# Patient Record
Sex: FEMALE | Race: WHITE | Employment: OTHER | URBAN - METROPOLITAN AREA
[De-identification: names, ages, dates, MRNs, and addresses within clinical notes are randomized per-mention and may not be internally consistent; named-entity substitution may affect disease eponyms.]

---

## 2017-08-23 LAB — PAP SMEAR, EXTERNAL: NEGATIVE

## 2018-09-27 ENCOUNTER — OFFICE VISIT (OUTPATIENT)
Dept: INTERNAL MEDICINE CLINIC | Age: 38
End: 2018-09-27

## 2018-09-27 VITALS
RESPIRATION RATE: 16 BRPM | HEART RATE: 69 BPM | HEIGHT: 67 IN | TEMPERATURE: 98.2 F | SYSTOLIC BLOOD PRESSURE: 129 MMHG | BODY MASS INDEX: 34.37 KG/M2 | DIASTOLIC BLOOD PRESSURE: 90 MMHG | OXYGEN SATURATION: 96 % | WEIGHT: 219 LBS

## 2018-09-27 DIAGNOSIS — J98.8 RESPIRATORY TRACT INFECTION: ICD-10-CM

## 2018-09-27 DIAGNOSIS — G43.809 OTHER MIGRAINE WITHOUT STATUS MIGRAINOSUS, NOT INTRACTABLE: ICD-10-CM

## 2018-09-27 DIAGNOSIS — M54.5 LOW BACK PAIN, UNSPECIFIED BACK PAIN LATERALITY, UNSPECIFIED CHRONICITY, WITH SCIATICA PRESENCE UNSPECIFIED: ICD-10-CM

## 2018-09-27 DIAGNOSIS — M25.552 LEFT HIP PAIN: ICD-10-CM

## 2018-09-27 DIAGNOSIS — E03.9 ACQUIRED HYPOTHYROIDISM: Primary | ICD-10-CM

## 2018-09-27 RX ORDER — PENICILLIN V POTASSIUM 500 MG/1
TABLET, FILM COATED ORAL
Refills: 0 | COMMUNITY
Start: 2018-07-28 | End: 2018-09-27 | Stop reason: ALTCHOICE

## 2018-09-27 RX ORDER — BUTALBITAL, ACETAMINOPHEN AND CAFFEINE 50; 325; 40 MG/1; MG/1; MG/1
TABLET ORAL
Refills: 1 | COMMUNITY
Start: 2018-08-10 | End: 2020-03-12

## 2018-09-27 RX ORDER — SUMATRIPTAN 100 MG/1
100 TABLET, FILM COATED ORAL
Refills: 3 | COMMUNITY
Start: 2018-08-09 | End: 2020-08-03

## 2018-09-27 RX ORDER — LEVOTHYROXINE AND LIOTHYRONINE 38; 9 UG/1; UG/1
60 TABLET ORAL DAILY
Qty: 90 TAB | Refills: 3 | Status: SHIPPED | OUTPATIENT
Start: 2018-09-27 | End: 2020-03-12

## 2018-09-27 RX ORDER — IBUPROFEN 600 MG/1
TABLET ORAL
Refills: 0 | COMMUNITY
Start: 2018-07-18 | End: 2020-08-03

## 2018-09-27 NOTE — PROGRESS NOTES
Chief Complaint Patient presents with [facility] Establish Care  Back Pain  
  upper back pain for 4 days due to having a Cold for the past week  Cold Symptoms  
  x 1 week Patient was given a copy of the Advanced Medical Directive form and understands to bring it in once completed. 1. Have you been to the ER, urgent care clinic since your last visit? Hospitalized since your last visit? Yes When: 7-17-18 Where: [facility] ER facility in [place] Reason: Right Hand pain 2. Have you seen or consulted any other health care providers outside of the [address] [place] [address] since your last visit? Include any pap smears or colon screening.  No

## 2018-09-27 NOTE — PROGRESS NOTES
INTERNISTS OF [facility]: 
10/7/2018, MRN: [id number] Osmani Cruz is a 43 y.o. female and presents to clinic to [facility]; Back Pain (upper back pain for 4 days due to having a Cold for the past week); and Cold Symptoms (x 1 week) Subjective: The pt is a 41yo female with h/o hypothyroidism, migraine HAs, thoracic outlet syndrome (left) s/p surgery 12/17, and kidney stones. 1. Health Maintenance: 
- Last PAP: +H/o bicornuate uterus. +H/o endometriosis. +H/o hysterectomy but she still has a cervix. Her last PAP was a year ago. +Followed by GYN team.  
- Diet: She is trying to diet. - Exercise: She is exercising regularly but hasn't since she had a respiratory tract infection. 
- Tobacco use: None - Drug use: None 
- ETOH use: Socially/rarely - Energy drink consumption: None 2. Hypothyroidism: Diagnosed 4 yrs ago. She tried \"other ones\" [thyroid rx] and found that [place] thyroid rx worked best for her. She's been out of her rx x 2 months. No h/o thyroid nodules. 3. Respiratory Tract Infection: Present x 1 wk. No sore throat. Cough sx resolved. Sx are improving. No SOB. +Nasal congestion. She declines getting the flu vaccine every year. 4. Migraine HAs: Her Imitrex dose was increased to 100mg 6 months ago. She tries to take motrin first if she has HAs. If pain continues, she will take fioricet. If sx persist, she will take Imitrex. She has had botox injections in the past which eventually stopped working. Her previous Neurologist worked at the [employer] in [place]. She's had \"tons of MRIs. \" She gets on avg one HA a wk. Pain is retro-orbital. +Photosensitivty and phonosensitivity. She was unable to get relief with midrine and other triptan abortive rxs in the past. +Auras (smell or vision disturbances). Patient Active Problem List  
 Diagnosis Date Noted  BMI 34.0-34.9,adult 10/07/2018  Acquired hypothyroidism 10/07/2018  Migraine 10/07/2018 Current Outpatient Prescriptions Medication Sig Dispense Refill  butalbital-acetaminophen-caffeine (FIORICET, ESGIC) -40 mg per tablet TAKE 1 TABLET BY MOUTH EVERY 6 HOURS AS NEEDED FOR MIGRAINE  1  
 SUMAtriptan (IMITREX) 100 mg tablet   3  ibuprofen (MOTRIN) 600 mg tablet TAKE ONE TABLET BY MOUTH EVERY 6 HOURS AS NEEDED  0  
 thyroid, Pork, (ARMOUR THYROID) 60 mg tablet Take 1 Tab by mouth daily. 90 Tab 3 Allergies Allergen Reactions  Codeine Hives, Itching and Swelling Past Medical History:  
Diagnosis Date  Benign tumor of cervix  Calculus of kidney  Elevated BP without diagnosis of hypertension  Fracture of finger of right hand 2018  
 5th digit  H/O thoracic outlet syndrome  Headache  Migraine headache  Thyroid disease   
 hypothyroidism  Trauma Past Surgical History:  
Procedure Laterality Date  HX BREAST REDUCTION    
 bilateral  
 HX  SECTION    
 x2  
 HX CHOLECYSTECTOMY  HX DILATION AND CURETTAGE    
 x3  
 HX PARTIAL HYSTERECTOMY Family History Problem Relation Age of Onset  Migraines Mother  Hypertension Mother  Cancer Father  Heart Disease Father  Heart Attack Father  No Known Problems Sister  Diabetes Maternal Grandmother  Dementia Maternal Grandmother  No Known Problems Maternal Grandfather  No Known Problems Paternal Grandmother  No Known Problems Paternal Grandfather  Migraines Son  Migraines Daughter Social History Substance Use Topics  Smoking status: Never Smoker  Smokeless tobacco: Never Used  Alcohol use Yes Comment: rare ROS Review of Systems Constitutional: Negative for chills and fever. HENT: Positive for congestion. Negative for ear pain and sore throat. Eyes: Negative for blurred vision and pain. Respiratory: Negative for cough and shortness of breath. Cardiovascular: Negative for chest pain. Gastrointestinal: Negative for abdominal pain, blood in stool and melena. Genitourinary: Negative for dysuria and hematuria. Musculoskeletal: Positive for back pain and joint pain (left hip pain off/on). Negative for myalgias. Skin: Negative for rash. Neurological: Positive for headaches. Negative for tingling and focal weakness. Endo/Heme/Allergies: Does not bruise/bleed easily. Psychiatric/Behavioral: Negative for substance abuse. Objective Vitals:  
 09/27/18 4797 BP: 129/90 Pulse: 69 Resp: 16 Temp: 98.2 °F (36.8 °C) TempSrc: Oral  
SpO2: 96% Weight: 219 lb (99.3 kg) Height: 5' 6.93\" (1.7 m) PainSc:   5 PainLoc: Back Physical Exam  
Constitutional: She is oriented to person, place, and time and well-developed, well-nourished, and in no distress. HENT:  
Head: Normocephalic and atraumatic. Right Ear: External ear normal.  
Left Ear: External ear normal.  
Nose: Nose normal.  
Mouth/Throat: Oropharynx is clear and moist. No oropharyngeal exudate. Clear TMs Eyes: Conjunctivae and EOM are normal. Pupils are equal, round, and reactive to light. Right eye exhibits no discharge. Left eye exhibits no discharge. No scleral icterus. Neck: Neck supple. No thyromegaly present. Cardiovascular: Normal rate, regular rhythm, normal heart sounds and intact distal pulses. Exam reveals no gallop and no friction rub. No murmur heard. Pulmonary/Chest: Effort normal and breath sounds normal. No respiratory distress. She has no wheezes. She has no rales. Abdominal: Soft. Bowel sounds are normal. She exhibits no distension. There is no tenderness. There is no rebound and no guarding. Musculoskeletal: She exhibits no edema or tenderness (BUE). Good ROM along b/l hip jts. No effusions. Spinous processes are NTTP Lymphadenopathy:  
  She has no cervical adenopathy. Neurological: She is alert and oriented to person, place, and time. She exhibits normal muscle tone. Gait normal.  
Negative straight leg raise test b/l  
Skin: Skin is warm and dry. No erythema. Psychiatric: Affect normal.  
Nursing note and vitals reviewed. Assessment/Plan: 1. Acquired hypothyroidism: She's been out of her thyroid rx x 2 months. Checking a CBC, BMP, and TFTs.  Refilling her [place] Thyroid pork Rx. ORDERS: 
- CBC WITH AUTOMATED DIFF; Future - METABOLIC PANEL, BASIC; Future 
- TSH AND FREE T4; Future - thyroid, Pork, (ARMOUR THYROID) 60 mg tablet; Take 1 Tab by mouth daily. Dispense: 90 Tab; Refill: 3 2. BMI 34.0-34.9,adult: Weight is 219lbs. I encouraged her to reduce her processed food intake and to get regular aerobic exercise. I will recheck her weight at her follow-up appointment. I will screen her for diabetes with an A1c. ORDERS: 
- HEMOGLOBIN A1C W/O EAG; Future 3. Respiratory tract infection: Resolving per hx. PE findings are reassuring. Observation. 4. Other migraine : Stable. Botox injections and midrin per pt hx did not improve her sx Placing a referral to neurology for long-term management. Continue with medications as previously prescribed ORDERS: 
[facility] Neuro ref [facility] 5. Health Maintenance: Requesting her previous PCP records, last Pap smear, and vaccine records.  Activity as tolerated. If her left hip and lower back pain worsen, I may refer her to PT/Orthopedics at her follow-up appointment. Health Maintenance Due Topic Date Due  
 DTaP/Tdap/Td series (1 - Tdap) 05/06/2000  PAP AKA CERVICAL CYTOLOGY  05/06/2000  Influenza Age 5 to Adult  08/01/2018 Lab review: labs ordered as mentioned above I have discussed the diagnosis with the patient and the intended plan as seen in the above orders. The patient has received an after-visit summary and questions were answered concerning future plans.   I have discussed medication side effects and warnings with the patient as well. I have reviewed the plan of care with the patient, accepted their input and they are in agreement with the treatment goals. All questions were answered. The patient understands the plan of care. Handouts provided today with above information. Pt instructed if symptoms worsen to call the office or report to the ED for continued care. Greater than 50% of the visit time was spent in counseling and/or coordination of care. Voice recognition was used to generate this report, which may have resulted in some phonetic based errors in grammar and contents. Even though attempts were made to correct all the mistakes, some may have been missed, and remained in the body of the document. Follow-up Disposition: 
Return in about 8 weeks (around 11/22/2018), or if symptoms worsen or fail to improve, for hypothyroidism, back pain, left hip pain.  
 
Isabel Sanches MD

## 2018-09-27 NOTE — PATIENT INSTRUCTIONS
Patient was given a copy of the Advanced Medical Directive form and understands to bring it in once completed. Health Maintenance Due Topic Date Due  
 DTaP/Tdap/Td series (1 - Tdap) 05/06/2000  PAP AKA CERVICAL CYTOLOGY  05/06/2000  Influenza Age 5 to Adult  08/01/2018 Body Mass Index: Care Instructions Your Care Instructions Body mass index (BMI) can help you see if your weight is raising your risk for health problems. It uses a formula to compare how much you weigh with how tall you are. · A BMI lower than 18.5 is considered underweight. · A BMI between 18.5 and 24.9 is considered healthy. · A BMI between 25 and 29.9 is considered overweight. A BMI of 30 or higher is considered obese. If your BMI is in the normal range, it means that you have a lower risk for weight-related health problems. If your BMI is in the overweight or obese range, you may be at increased risk for weight-related health problems, such as high blood pressure, heart disease, stroke, arthritis or joint pain, and diabetes. If your BMI is in the underweight range, you may be at increased risk for health problems such as fatigue, lower protection (immunity) against illness, muscle loss, bone loss, hair loss, and hormone problems. BMI is just one measure of your risk for weight-related health problems. You may be at higher risk for health problems if you are not active, you eat an unhealthy diet, or you drink too much alcohol or use tobacco products. Follow-up care is a key part of your treatment and safety. Be sure to make and go to all appointments, and call your doctor if you are having problems. It's also a good idea to know your test results and keep a list of the medicines you take. How can you care for yourself at home? · Practice healthy eating habits. This includes eating plenty of fruits, vegetables, whole grains, lean protein, and low-fat dairy. · If your doctor recommends it, get more exercise. Walking is a good choice. Bit by bit, increase the amount you walk every day. Try for at least 30 minutes on most days of the week. · Do not smoke. Smoking can increase your risk for health problems. If you need help quitting, talk to your doctor about stop-smoking programs and medicines. These can increase your chances of quitting for good. · Limit alcohol to 2 drinks a day for men and 1 drink a day for women. Too much alcohol can cause health problems. If you have a BMI higher than 25 · Your doctor may do other tests to check your risk for weight-related health problems. This may include measuring the distance around your waist. A waist measurement of more than 40 inches in men or 35 inches in women can increase the risk of weight-related health problems. · Talk with your doctor about steps you can take to stay healthy or improve your health. You may need to make lifestyle changes to lose weight and stay healthy, such as changing your diet and getting regular exercise. If you have a BMI lower than 18.5 · Your doctor may do other tests to check your risk for health problems. · Talk with your doctor about steps you can take to stay healthy or improve your health. You may need to make lifestyle changes to gain or maintain weight and stay healthy, such as getting more healthy foods in your diet and doing exercises to build muscle. Where can you learn more? Go to http://julio-kristi.info/. Enter S176 in the search box to learn more about \"Body Mass Index: Care Instructions. \" Current as of: October 9, 2017 Content Version: 11.7 © 1712-5304 Healthwise, Incorporated. Care instructions adapted under license by [employer] (which disclaims liability or warranty for this information).  If you have questions about a medical condition or this instruction, always ask your healthcare professional. Laurel Crawford, Inc[employer] disclaims any warranty or liability for your use of this information.

## 2018-09-27 NOTE — MR AVS SNAPSHOT
[facility] 
 
 
 [address], [address] [facility] 
[phone] Patient: Concepción Veras MRN: [id number] :1980 Visit Information Date & Time Provider Department Dept. Phone Encounter #  
 2018  9:30 AM Isabel Sanches MD [facility] of [address] [place] [address] [phone] Follow-up Instructions Return in about 8 weeks (around 2018), or if symptoms worsen or fail to improve, for hypothyroidism, back pain, left hip pain. Your Appointments 2018 10:35 AM  
LAB with [facility] NURSE VISIT [facility] of [address] [place] [address] [facility] Appt Note: labs per [address], [address] Replaced by [facility] [address]  
  
   
 [address], [address]  
  
    
 2018 11:30 AM  
Office Visit with Isabel Sanches MD  
Internists of [address] [place] [address]) Appt Note: ov per [facility], [address] [facility] [address]  
  
   
 [address], [address] Upcoming Health Maintenance Date Due DTaP/Tdap/Td series (1 - Tdap) 2000 PAP AKA CERVICAL CYTOLOGY 2000 Influenza Age 5 to Adult 2018 Allergies as of 2018  Review Complete On: 2018 By: Isabel Sanches MD  
  
 Severity Noted Reaction Type Reactions Codeine High 2018    Hives, Itching, Swelling Current Immunizations  Never Reviewed No immunizations on file. Not reviewed this visit You Were Diagnosed With   
  
 Codes Comments Acquired hypothyroidism    -  Primary ICD-10-CM: E03.9 ICD-9-CM: 244.9 BMI 34.0-34.9,adult     ICD-10-CM: K31.30 
ICD-9-CM: V85.34 Respiratory tract infection     ICD-10-CM: J98.8 ICD-9-CM: 519.8 Other migraine without status migrainosus, not intractable     ICD-10-CM: C67.325 ICD-9-CM: 346.80 Vitals BP Pulse Temp Resp Height(growth percentile) Weight(growth percentile) 129/90 (BP 1 Location: Left arm, BP Patient Position: Sitting) 69 98.2 °F (36.8 °C) (Oral) 16 5' 6.93\" (1.7 m) 219 lb (99.3 kg) SpO2 BMI OB Status Smoking Status 96% 34.37 kg/m2 Hysterectomy Never Smoker BMI and BSA Data Body Mass Index Body Surface Area  
 34.37 kg/m 2 2.17 m 2 Preferred Pharmacy Pharmacy Name Phone [facility] [address] [facility], [address] [place] [address] [address] Your Updated Medication List  
  
   
This list is accurate as of 9/27/18 10:34 AM.  Always use your most recent med list.  
  
  
  
  
 butalbital-acetaminophen-caffeine -40 mg per tablet Commonly known as:  FIORICET, ESGIC  
TAKE 1 TABLET BY MOUTH EVERY 6 HOURS AS NEEDED FOR MIGRAINE  
  
 ibuprofen 600 mg tablet Commonly known as:  MOTRIN  
TAKE ONE TABLET BY MOUTH EVERY 6 HOURS AS NEEDED  
  
 SUMAtriptan 100 mg tablet Commonly known as:  IMITREX  
  
 thyroid (Pork) 60 mg tablet Commonly known as:  ARMOUR THYROID Take 1 Tab by mouth daily. Prescriptions Sent to Pharmacy Refills  
 thyroid, Pork, (ARMOUR THYROID) 60 mg tablet 3 Sig: Take 1 Tab by mouth daily. Class: Normal  
 Pharmacy: [address] [place] [address], [address] [place] [address] #: [phone] Route: Oral  
  
We Performed the Following REFERRAL TO NEUROLOGY [UGR29 Custom] Follow-up Instructions Return in about 8 weeks (around 11/22/2018), or if symptoms worsen or fail to improve, for hypothyroidism, back pain, left hip pain. To-Do List   
 09/27/2018 Lab:  CBC WITH AUTOMATED DIFF Around 09/27/2018 Lab:  HEMOGLOBIN A1C W/O EAG Around 09/27/2018 Lab:  METABOLIC PANEL, BASIC Around 09/27/2018 Lab:  TSH AND FREE T4 Referral Information Referral ID Referred By Referred To  
  
 [id number] Jeannie White MD   
   [id number]   
   [address] [address] Phone: [phone] Fax: [phone] Visits Status Start Date End Date 1 New Request 9/27/18 9/27/19 If your referral has a status of pending review or denied, additional information will be sent to support the outcome of this decision. Patient Instructions Patient was given a copy of the Advanced Medical Directive form and understands to bring it in once completed. Health Maintenance Due Topic Date Due  
 DTaP/Tdap/Td series (1 - Tdap) 05/06/2000  PAP AKA CERVICAL CYTOLOGY  05/06/2000  Influenza Age 5 to Adult  08/01/2018 Body Mass Index: Care Instructions Your Care Instructions Body mass index (BMI) can help you see if your weight is raising your risk for health problems. It uses a formula to compare how much you weigh with how tall you are. · A BMI lower than 18.5 is considered underweight. · A BMI between 18.5 and 24.9 is considered healthy. · A BMI between 25 and 29.9 is considered overweight. A BMI of 30 or higher is considered obese. If your BMI is in the normal range, it means that you have a lower risk for weight-related health problems. If your BMI is in the overweight or obese range, you may be at increased risk for weight-related health problems, such as high blood pressure, heart disease, stroke, arthritis or joint pain, and diabetes. If your BMI is in the underweight range, you may be at increased risk for health problems such as fatigue, lower protection (immunity) against illness, muscle loss, bone loss, hair loss, and hormone problems. BMI is just one measure of your risk for weight-related health problems. You may be at higher risk for health problems if you are not active, you eat an unhealthy diet, or you drink too much alcohol or use tobacco products. Follow-up care is a key part of your treatment and safety. Be sure to make and go to all appointments, and call your doctor if you are having problems. It's also a good idea to know your test results and keep a list of the medicines you take. How can you care for yourself at home? · Practice healthy eating habits. This includes eating plenty of fruits, vegetables, whole grains, lean protein, and low-fat dairy. · If your doctor recommends it, get more exercise. Walking is a good choice. Bit by bit, increase the amount you walk every day. Try for at least 30 minutes on most days of the week. · Do not smoke. Smoking can increase your risk for health problems. If you need help quitting, talk to your doctor about stop-smoking programs and medicines. These can increase your chances of quitting for good. · Limit alcohol to 2 drinks a day for men and 1 drink a day for women. Too much alcohol can cause health problems. If you have a BMI higher than 25 · Your doctor may do other tests to check your risk for weight-related health problems. This may include measuring the distance around your waist. A waist measurement of more than 40 inches in men or 35 inches in women can increase the risk of weight-related health problems. · Talk with your doctor about steps you can take to stay healthy or improve your health. You may need to make lifestyle changes to lose weight and stay healthy, such as changing your diet and getting regular exercise. If you have a BMI lower than 18.5 · Your doctor may do other tests to check your risk for health problems. · Talk with your doctor about steps you can take to stay healthy or improve your health. You may need to make lifestyle changes to gain or maintain weight and stay healthy, such as getting more healthy foods in your diet and doing exercises to build muscle. Where can you learn more? Go to http://julio-kristi.info/. Enter S176 in the search box to learn more about \"Body Mass Index: Care Instructions. \" Current as of: October 9, 2017 Content Version: 11.7 © 3083-3798 [employer], [employer]. Care instructions adapted under license by [employer] (which disclaims liability or warranty for this information). If you have questions about a medical condition or this instruction, always ask your healthcare professional. [place] [address] any warranty or liability for your use of this information. Introducing [facility]! [facility] introduces [employer] patient portal. Now you can access parts of your medical record, email your doctor's office, and request medication refills online. 1. In your internet browser, go to [email or web address] 2. Click on the First Time User? Click Here link in the Sign In box. You will see the New Member Sign Up page. 3. Enter your [employer] Access Code exactly as it appears below. You will not need to use this code after youve completed the sign-up process. If you do not sign up before the expiration date, you must request a new code. · [employer] Access Code: [email or web address] Expires: 12/26/2018  8:43 AM 
 
4. Enter the last four digits of your Social Security Number (xxxx) and Date of Birth (mm/dd/yyyy) as indicated and click Submit. You will be taken to the next sign-up page. 5. Create a [employer] ID. This will be your [employer] login ID and cannot be changed, so think of one that is secure and easy to remember. 6. Create a [employer] password. You can change your password at any time. 7. Enter your Password Reset Question and Answer. This can be used at a later time if you forget your password. 8. Enter your e-mail address. You will receive e-mail notification when new information is available in [address] 9. Click Sign Up. You can now view and download portions of your medical record. 10. Click the Download Summary menu link to download a portable copy of your medical information. If you have questions, please visit the Frequently Asked Questions section of the [employer] website. Remember, [employer] is NOT to be used for urgent needs. For medical emergencies, dial 911. Now available from your iPhone and Android! Please provide this summary of care documentation to your next provider. Your primary care clinician is listed as Michael Fortune. If you have any questions after today's visit, please call [phone].

## 2018-10-07 PROBLEM — G43.909 MIGRAINE: Status: ACTIVE | Noted: 2018-10-07

## 2018-10-07 PROBLEM — E03.9 ACQUIRED HYPOTHYROIDISM: Status: ACTIVE | Noted: 2018-10-07

## 2018-10-07 PROBLEM — M25.552 LEFT HIP PAIN: Status: ACTIVE | Noted: 2018-10-07

## 2018-10-07 PROBLEM — M54.50 LOW BACK PAIN: Status: ACTIVE | Noted: 2018-10-07

## 2018-11-08 ENCOUNTER — APPOINTMENT (OUTPATIENT)
Dept: INTERNAL MEDICINE CLINIC | Age: 38
End: 2018-11-08

## 2018-11-09 LAB
CREATININE, EXTERNAL: 0.7
HBA1C MFR BLD HPLC: 5.3 %

## 2018-11-14 ENCOUNTER — TELEPHONE (OUTPATIENT)
Dept: INTERNAL MEDICINE CLINIC | Age: 38
End: 2018-11-14

## 2018-11-14 NOTE — TELEPHONE ENCOUNTER
----- Message from Espinoza Langston MD sent at 11/14/2018  7:44 AM EST -----  Regarding: lab results  Please let her know that her TFTs, BMP, and A1C are normal. There is no evidence of prediabetes/diabetes or CKD. She should c/w rx as prescribed. Her CBC shows a mildly elevated platelet count of 790 (normal range is 140-400). I will recheck this in 6-12 months. No additional studies are warranted at this time.     Dr. Conchis Mendez  [facility], [address] [place] [address], [address] [place] [address] Str.  Phone: [phone]  Fax: [phone]

## 2018-12-12 ENCOUNTER — OFFICE VISIT (OUTPATIENT)
Dept: INTERNAL MEDICINE CLINIC | Age: 38
End: 2018-12-12

## 2018-12-12 VITALS
TEMPERATURE: 98 F | SYSTOLIC BLOOD PRESSURE: 137 MMHG | WEIGHT: 221.6 LBS | RESPIRATION RATE: 14 BRPM | DIASTOLIC BLOOD PRESSURE: 90 MMHG | OXYGEN SATURATION: 99 % | BODY MASS INDEX: 34.78 KG/M2 | HEIGHT: 67 IN | HEART RATE: 71 BPM

## 2018-12-12 DIAGNOSIS — R07.9 CHEST PAIN, UNSPECIFIED TYPE: ICD-10-CM

## 2018-12-12 DIAGNOSIS — R00.2 PALPITATIONS: Primary | ICD-10-CM

## 2018-12-12 PROBLEM — M54.50 LOW BACK PAIN: Status: RESOLVED | Noted: 2018-10-07 | Resolved: 2018-12-12

## 2018-12-12 PROBLEM — M25.552 LEFT HIP PAIN: Status: RESOLVED | Noted: 2018-10-07 | Resolved: 2018-12-12

## 2018-12-12 RX ORDER — IBUPROFEN 600 MG/1
600 TABLET ORAL
COMMUNITY
Start: 2018-07-18 | End: 2018-12-12 | Stop reason: SDUPTHER

## 2018-12-12 RX ORDER — HYDROCODONE BITARTRATE AND ACETAMINOPHEN 5; 325 MG/1; MG/1
TABLET ORAL
Refills: 0 | COMMUNITY
Start: 2018-10-05 | End: 2018-12-12 | Stop reason: ALTCHOICE

## 2018-12-12 RX ORDER — PENICILLIN V POTASSIUM 500 MG/1
TABLET, FILM COATED ORAL
Refills: 0 | COMMUNITY
Start: 2018-10-05 | End: 2018-12-12 | Stop reason: ALTCHOICE
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
## 2018-12-12 NOTE — PROGRESS NOTES
Chief Complaint   Patient presents with    Heart Murmur     patient was with [employer] Donating Plasma when the RN who was taking her Blood Pressure Manually when she heard a \"Swishing Sound\" or possible Murmur        1. Have you been to the ER, urgent care clinic since your last visit? Hospitalized since your last visit? No    2. Have you seen or consulted any other health care providers outside of the [address] [place] [address] since your last visit? Include any pap smears or colon screening. No    Patient was given a copy of the Advanced Directive and understands to bring it in once completed.   Health Maintenance Due   Topic Date Due    DTaP/Tdap/Td series (1 - Tdap) 05/06/2000    PAP AKA CERVICAL CYTOLOGY  05/06/2000    Influenza Age 9 to Adult  08/01/2018

## 2018-12-12 NOTE — PATIENT INSTRUCTIONS
Patient was given a copy of the Advanced Medical Directive Form, and understands to bring it in once completed. Health Maintenance Due   Topic Date Due    DTaP/Tdap/Td series (1 - Tdap) 05/06/2000    PAP AKA CERVICAL CYTOLOGY  05/06/2000    Influenza Age 5 to Adult  08/01/2018          Chest Pain: Care Instructions  Your Care Instructions    There are many things that can cause chest pain. Some are not serious and will get better on their own in a few days. But some kinds of chest pain need more testing and treatment. Your doctor may have recommended a follow-up visit in the next 8 to 12 hours. If you are not getting better, you may need more tests or treatment. Even though your doctor has released you, you still need to watch for any problems. The doctor carefully checked you, but sometimes problems can develop later. If you have new symptoms or if your symptoms do not get better, get medical care right away. If you have worse or different chest pain or pressure that lasts more than 5 minutes or you passed out (lost consciousness), call 911 or seek other emergency help right away. A medical visit is only one step in your treatment. Even if you feel better, you still need to do what your doctor recommends, such as going to all suggested follow-up appointments and taking medicines exactly as directed. This will help you recover and help prevent future problems. How can you care for yourself at home? · Rest until you feel better. · Take your medicine exactly as prescribed. Call your doctor if you think you are having a problem with your medicine. · Do not drive after taking a prescription pain medicine. When should you call for help? Call 911 if:    · You passed out (lost consciousness).     · You have severe difficulty breathing.     · You have symptoms of a heart attack. These may include:  ? Chest pain or pressure, or a strange feeling in your chest.  ? Sweating. ? Shortness of breath.   ? Nausea or vomiting. ? Pain, pressure, or a strange feeling in your back, neck, jaw, or upper belly or in one or both shoulders or arms. ? Lightheadedness or sudden weakness. ? A fast or irregular heartbeat. After you call 911, the  may tell you to chew 1 adult-strength or 2 to 4 low-dose aspirin. Wait for an ambulance. Do not try to drive yourself.    Call your doctor today if:    · You have any trouble breathing.     · Your chest pain gets worse.     · You are dizzy or lightheaded, or you feel like you may faint.     · You are not getting better as expected.     · You are having new or different chest pain. Where can you learn more? Go to http://julio-kristi.info/. Enter A120 in the search box to learn more about \"Chest Pain: Care Instructions. \"  Current as of: November 20, 2017  Content Version: 11.8  © 4230-6738 [employer]. Care instructions adapted under license by [employer] (which disclaims liability or warranty for this information). If you have questions about a medical condition or this instruction, always ask your healthcare professional. [address] any warranty or liability for your use of this information.

## 2018-12-14 ENCOUNTER — OFFICE VISIT (OUTPATIENT)
Dept: CARDIOLOGY CLINIC | Age: 38
End: 2018-12-14

## 2018-12-14 VITALS
OXYGEN SATURATION: 98 % | HEART RATE: 73 BPM | HEIGHT: 67 IN | DIASTOLIC BLOOD PRESSURE: 94 MMHG | BODY MASS INDEX: 34.37 KG/M2 | SYSTOLIC BLOOD PRESSURE: 146 MMHG | WEIGHT: 219 LBS

## 2018-12-14 DIAGNOSIS — R03.0 BORDERLINE HYPERTENSION: ICD-10-CM

## 2018-12-14 DIAGNOSIS — Z82.49 FAMILY HISTORY OF PREMATURE CAD: ICD-10-CM

## 2018-12-14 DIAGNOSIS — G54.0: ICD-10-CM

## 2018-12-14 DIAGNOSIS — R01.1 MURMUR, HEART: ICD-10-CM

## 2018-12-14 DIAGNOSIS — R07.9 CHEST PAIN, UNSPECIFIED TYPE: Primary | ICD-10-CM

## 2018-12-14 NOTE — PROGRESS NOTES
Kikididier Cheri presents today for   Chief Complaint   Patient presents with    New Patient     referred by PCP for chest pain and palps     Dizziness     sometimes    Palpitations     fluttering/racing    Chest Pain     intermittent heaviness, pressure, sharp, left sided, several months       Bird Alonzo preferred language for health care discussion is english/other. Is someone accompanying this pt? No     Is the patient using any DME equipment during OV? No     Depression Screening:  PHQ over the last two weeks 9/27/2018   Little interest or pleasure in doing things Not at all   Feeling down, depressed, irritable, or hopeless Not at all   Total Score PHQ 2 0       Learning Assessment:  Learning Assessment 9/27/2018   PRIMARY LEARNER Patient   HIGHEST LEVEL OF EDUCATION - PRIMARY LEARNER  2 YEARS OF COLLEGE   BARRIERS PRIMARY LEARNER NONE   CO-LEARNER CAREGIVER No   PRIMARY LANGUAGE ENGLISH   LEARNER PREFERENCE PRIMARY DEMONSTRATION   ANSWERED BY patient   RELATIONSHIP SELF       Abuse Screening:  Abuse Screening Questionnaire 9/27/2018   Do you ever feel afraid of your partner? N   Are you in a relationship with someone who physically or mentally threatens you? N   Is it safe for you to go home? Y       Fall Risk  No flowsheet data found. Pt currently taking Anticoagulant therapy? No     Coordination of Care:  1. Have you been to the ER, urgent care clinic since your last visit? Hospitalized since your last visit? No     2. Have you seen or consulted any other health care providers outside of the [address] [place] [address] since your last visit? Include any pap smears or colon screening.  No

## 2018-12-14 NOTE — PROGRESS NOTES
HISTORY OF PRESENT ILLNESS  Jackeline Diamond is a 43 y.o. female. HPI    Patient presents for a new office visit. She was referred by her PCP for evaluation of a heart murmur. She has a past medical history significant for borderline hypertension, history of a cervical rib causing a thoracic outlet syndrome, status post removal of the rib in 2017 while living in [place]. She was having symptoms of arterial insufficiency of her left upper extremity, dizziness, and fainting. All of which have improved. She reports undergoing an EKG and echocardiogram prior to the surgery last year. She was told that both of these were normal.  She has been trying to exercise more vigorously over the past 6 months and has lost a total of 40 pounds in weight. She usually exercises on both the treadmill and elliptical machine. Over the past few weeks, she did notice some exertional dizziness, but no syncope. No exertional chest pain or shortness of breath. He does complain of intermittent sharp left-sided chest pain which usually occurs at rest or when she is under stress. No leg swelling, no orthopnea, no PND. She does have a family history for premature coronary disease with her father having bypass surgery at age 39 though he was a heavy smoker. She has never been a smoker. Past Medical History:   Diagnosis Date    Benign tumor of cervix     Calculus of kidney     Elevated BP without diagnosis of hypertension     Fracture of finger of right hand 07/17/2018    5th digit    H/O thoracic outlet syndrome     Headache     Migraine headache     Thyroid disease     hypothyroidism    Trauma      Current Outpatient Medications   Medication Sig Dispense Refill    butalbital-acetaminophen-caffeine (FIORICET, ESGIC) -40 mg per tablet TAKE 1 TABLET BY MOUTH EVERY 6 HOURS AS NEEDED FOR MIGRAINE  1    SUMAtriptan (IMITREX) 100 mg tablet Take 100 mg by mouth once as needed.   3    ibuprofen (MOTRIN) 600 mg tablet TAKE ONE TABLET BY MOUTH EVERY 6 HOURS AS NEEDED  0    thyroid, Pork, (ARMOUR THYROID) 60 mg tablet Take 1 Tab by mouth daily. 90 Tab 3     Allergies   Allergen Reactions    Codeine Hives, Itching, Swelling and Anaphylaxis      Social History     Tobacco Use    Smoking status: Never Smoker    Smokeless tobacco: Never Used   Substance Use Topics    Alcohol use: Yes     Comment: rare    Drug use: Not on file     Family History   Problem Relation Age of Onset   Antony Flattery Migraines Mother     Hypertension Mother     Cancer Father     Heart Disease Father     Heart Attack Father     No Known Problems Sister     Diabetes Maternal Grandmother     Dementia Maternal Grandmother     No Known Problems Maternal Grandfather     No Known Problems Paternal Grandmother     No Known Problems Paternal Grandfather     Migraines Son     Migraines Daughter          Review of Systems   Constitutional: Negative for chills, fever and weight loss. HENT: Negative for nosebleeds. Eyes: Negative for blurred vision and double vision. Respiratory: Negative for cough, shortness of breath and wheezing. Cardiovascular: Positive for chest pain. Negative for palpitations, orthopnea, claudication, leg swelling and PND. Gastrointestinal: Negative for abdominal pain, heartburn, nausea and vomiting. Genitourinary: Negative for dysuria and hematuria. Musculoskeletal: Negative for falls and myalgias. Skin: Negative for rash. Neurological: Positive for dizziness. Negative for focal weakness and headaches. Endo/Heme/Allergies: Does not bruise/bleed easily. Psychiatric/Behavioral: Negative for substance abuse. Visit Vitals  BP (!) 146/94   Pulse 73   Ht 5' 6.93\" (1.7 m)   Wt 99.3 kg (219 lb)   SpO2 98%   BMI 34.37 kg/m²       Physical Exam   Constitutional: She is oriented to person, place, and time. She appears well-developed and well-nourished. HENT:   Head: Normocephalic and atraumatic.    Eyes: Conjunctivae are normal.   Neck: Neck supple. No JVD present. Carotid bruit is not present. Cardiovascular: Normal rate, regular rhythm, S1 normal, S2 normal and normal pulses. Exam reveals no gallop. Murmur heard. Blowing midsystolic murmur is present with a grade of 2/6 at the lower left sternal border. Pulmonary/Chest: Effort normal and breath sounds normal. She has no wheezes. She has no rales. Abdominal: Soft. Bowel sounds are normal. There is no tenderness. Musculoskeletal: She exhibits no edema, tenderness or deformity. Neurological: She is alert and oriented to person, place, and time. Skin: Skin is warm and dry. Psychiatric: She has a normal mood and affect. Her behavior is normal. Thought content normal.     EKG: Normal sinus rhythm, normal axis, low voltage, poor R wave progression, normal QTc interval, no ST or T wave abnormalities concerning for ischemia. No previous EKG for comparison. ASSESSMENT and PLAN  Encounter Diagnoses   Name Primary?  Chest pain, unspecified type Yes    Murmur, heart     Borderline hypertension     Family history of premature CAD     Arterial thoracic outlet syndrome due to cervical rib      Chest pain. Patient symptoms sound atypical for angina, however more concerning is her exertional dizziness, so I have  recommended a stress echocardiogram for further evaluation. I specifically want to evaluate for any dynamic outflow tract obstruction. Cardiac murmur. Patient has a soft midsystolic murmur which may just be a benign flow murmur, however, this should be evaluated with an echocardiogram to evaluate for any structural heart disease given her new exertional dizziness. Borderline hypertension. Patient blood pressure is mildly elevated in the office today. He was also mildly elevated when she saw her PCP earlier this week. I recommend she start checking this regularly. For now she can focus on lifestyle modifications with a low sodium diet.     Family history for premature coronary artery disease. If her stress test is low risk, lifestyle modification is recommended. She should also have her lipids checked annually by her PCP. Status post left-sided cervical rib removal in 2017 for thoracic outlet syndrome. Her symptoms improved following this surgery.     Follow-up in 3 months, sooner if needed

## 2018-12-17 DIAGNOSIS — R01.1 MURMUR, HEART: Primary | ICD-10-CM

## 2018-12-17 DIAGNOSIS — R07.9 CHEST PAIN, UNSPECIFIED TYPE: ICD-10-CM

## 2018-12-27 ENCOUNTER — HOSPITAL ENCOUNTER (OUTPATIENT)
Dept: NON INVASIVE DIAGNOSTICS | Age: 38
Discharge: HOME | End: 2018-12-27
Attending: INTERNAL MEDICINE
Payer: COMMERCIAL

## 2018-12-27 VITALS
SYSTOLIC BLOOD PRESSURE: 146 MMHG | DIASTOLIC BLOOD PRESSURE: 94 MMHG | WEIGHT: 219 LBS | BODY MASS INDEX: 35.2 KG/M2 | HEIGHT: 66 IN

## 2018-12-27 VITALS
WEIGHT: 219 LBS | HEIGHT: 66 IN | BODY MASS INDEX: 35.2 KG/M2 | DIASTOLIC BLOOD PRESSURE: 80 MMHG | SYSTOLIC BLOOD PRESSURE: 120 MMHG

## 2018-12-27 DIAGNOSIS — R07.9 CHEST PAIN, UNSPECIFIED TYPE: ICD-10-CM

## 2018-12-27 DIAGNOSIS — R01.1 MURMUR, HEART: ICD-10-CM

## 2018-12-27 LAB
ECHO AO ROOT DIAM: 2.69 CM
ECHO LA AREA 4C: 17.7 CM2
ECHO LA VOL 2C: 38.26 ML (ref 22–52)
ECHO LA VOL 4C: 46.86 ML (ref 22–52)
ECHO LA VOL BP: 45.81 ML (ref 22–52)
ECHO LA VOL/BSA BIPLANE: 22.04 ML/M2 (ref 16–28)
ECHO LA VOLUME INDEX A2C: 18.4 ML/M2 (ref 16–28)
ECHO LA VOLUME INDEX A4C: 22.54 ML/M2 (ref 16–28)
ECHO LV E' LATERAL VELOCITY: 9.87 CM/S
ECHO LV E' SEPTAL VELOCITY: 6.39 CM/S
ECHO LV INTERNAL DIMENSION DIASTOLIC: 4.55 CM (ref 3.9–5.3)
ECHO LV INTERNAL DIMENSION SYSTOLIC: 2.66 CM
ECHO LV IVSD: 0.78 CM (ref 0.6–0.9)
ECHO LV MASS 2D: 128.5 G (ref 67–162)
ECHO LV MASS INDEX 2D: 61.8 G/M2 (ref 43–95)
ECHO LV POSTERIOR WALL DIASTOLIC: 0.8 CM (ref 0.6–0.9)
ECHO LVOT DIAM: 1.98 CM
ECHO LVOT PEAK GRADIENT: 3.8 MMHG
ECHO LVOT PEAK VELOCITY: 98.03 CM/S
ECHO LVOT VTI: 21.47 CM
ECHO MV A VELOCITY: 68.72 CM/S
ECHO MV E DECELERATION TIME (DT): 120.3 MS
ECHO MV E VELOCITY: 0.93 CM/S
ECHO MV E/A RATIO: 0.01
ECHO MV E/E' LATERAL: 0.09
ECHO MV E/E' RATIO (AVERAGED): 0.12
ECHO MV E/E' SEPTAL: 0.15
ECHO PULMONARY ARTERY SYSTOLIC PRESSURE (PASP): 33 MMHG
ECHO TV REGURGITANT MAX VELOCITY: 275.28 CM/S
ECHO TV REGURGITANT PEAK GRADIENT: 30.3 MMHG
STRESS BASELINE DIAS BP: 80 MMHG
STRESS BASELINE HR: 71 BPM
STRESS BASELINE SYS BP: 120 MMHG
STRESS ESTIMATED WORKLOAD: 9.9 METS
STRESS EXERCISE DUR MIN: NORMAL
STRESS PEAK DIAS BP: 80 MMHG
STRESS PEAK SYS BP: 168 MMHG
STRESS PERCENT HR ACHIEVED: 111 %
STRESS POST PEAK HR: 171 BPM
STRESS RATE PRESSURE PRODUCT: NORMAL BPM*MMHG
STRESS ST DEPRESSION: 0 MM
STRESS ST ELEVATION: 0 MM
STRESS TARGET HR: 154 BPM

## 2018-12-27 PROCEDURE — 93306 TTE W/DOPPLER COMPLETE: CPT

## 2018-12-27 PROCEDURE — C8930 TTE W OR W/O CONTR, CONT ECG: HCPCS

## 2018-12-28 NOTE — PROGRESS NOTES
Per your last note\" Chest pain. Patient symptoms sound atypical for angina, however more concerning is her exertional dizziness, so I have  recommended a stress echocardiogram for further evaluation. I specifically want to evaluate for any dynamic outflow tract obstruction. 
  
Cardiac murmur. Patient has a soft midsystolic murmur which may just be a benign flow murmur, however, this should be evaluated with an echocardiogram to evaluate for any structural heart disease given her new exertional dizziness.

## 2019-01-07 DIAGNOSIS — E03.9 ACQUIRED HYPOTHYROIDISM: ICD-10-CM

## 2019-01-15 ENCOUNTER — TELEPHONE (OUTPATIENT)
Dept: CARDIOLOGY CLINIC | Age: 39
End: 2019-01-15

## 2019-01-15 NOTE — TELEPHONE ENCOUNTER
----- Message from Pankaj Garland MD sent at 1/7/2019  2:16 PM EST -----  Please let the patient know that her stress echocardiogram was normal.  ----- Message -----  From: Ashlyn Messina LPN  Sent: [id number]   1:20 PM  To: Pankaj Garland MD    Per your last note\" Chest pain. Patient symptoms sound atypical for angina, however more concerning is her exertional dizziness, so I have  recommended a stress echocardiogram for further evaluation. I specifically want to evaluate for any dynamic outflow tract obstruction.     Cardiac murmur.   Patient has a soft midsystolic murmur which may just be a benign flow murmur, however, this should be evaluated with an echocardiogram to evaluate for any structural heart disease given her new exertional dizziness.

## 2019-01-15 NOTE — TELEPHONE ENCOUNTER
----- Message from Radha Gamez MD sent at 1/7/2019  2:16 PM EST -----  Her resting echocardiogram was also normal.  ----- Message -----  From: Pipe Alexandra LPN  Sent: [id number]/65/7847   1:37 PM  To: Radha Gamez MD    Per your last note\" Chest pain. Patient symptoms sound atypical for angina, however more concerning is her exertional dizziness, so I have  recommended a stress echocardiogram for further evaluation. I specifically want to evaluate for any dynamic outflow tract obstruction.     Cardiac murmur. Patient has a soft midsystolic murmur which may just be a benign flow murmur, however, this should be evaluated with an echocardiogram to evaluate for any structural heart disease given her new exertional dizziness.

## 2019-01-15 NOTE — LETTER
1/15/2019 1:21 PM 
 
Ms. Heather Hahn [address] [place] [address] Dear Ms. Terri Bell, We have been unable to reach you by phone to notify you of your test results. Please call our office at [phone] and ask to speak with my nurse in order to explain these results to you and advise you of any recommendations. Sincerely, Clint Blank MD

## 2019-02-25 ENCOUNTER — TELEPHONE (OUTPATIENT)
Dept: INTERNAL MEDICINE CLINIC | Age: 39
End: 2019-02-25
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
## 2019-03-08 ENCOUNTER — OFFICE VISIT (OUTPATIENT)
Dept: NEUROLOGY | Age: 39
End: 2019-03-08

## 2019-03-08 VITALS
OXYGEN SATURATION: 97 % | DIASTOLIC BLOOD PRESSURE: 88 MMHG | BODY MASS INDEX: 36.29 KG/M2 | HEART RATE: 92 BPM | WEIGHT: 225.8 LBS | RESPIRATION RATE: 22 BRPM | HEIGHT: 66 IN | SYSTOLIC BLOOD PRESSURE: 140 MMHG | TEMPERATURE: 98.7 F

## 2019-03-08 DIAGNOSIS — E66.01 SEVERE OBESITY (HCC): Primary | ICD-10-CM

## 2019-03-08 DIAGNOSIS — G43.809 OTHER MIGRAINE WITHOUT STATUS MIGRAINOSUS, NOT INTRACTABLE: ICD-10-CM

## 2019-03-08 RX ORDER — ELETRIPTAN HYDROBROMIDE 40 MG/1
40 TABLET, FILM COATED ORAL
Qty: 12 TAB | Refills: 3 | Status: SHIPPED | OUTPATIENT
Start: 2019-03-08 | End: 2019-03-08
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
## 2019-03-08 NOTE — LETTER
3/8/2019 2:41 PM 
 
Patient:  Ulysses Esquivel YOB: 1980 Date of Visit: 3/8/2019 Dear Bc Randolph MD 
[address] [place] [address] VIA In Basket 
 : Thank you for referring Ms. Adrian Kennedy to me for evaluation/treatment. Below are the relevant portions of my assessment and plan of care. If you have questions, please do not hesitate to call me. I look forward to following Ms. Pedro Lovell along with you.  
 
 
 
Sincerely, 
 
 
Luis Angel Waller MD

## 2019-08-08 ENCOUNTER — TELEPHONE (OUTPATIENT)
Dept: INTERNAL MEDICINE CLINIC | Age: 39
End: 2019-08-08

## 2019-08-08 NOTE — TELEPHONE ENCOUNTER
Pt was seen at [facility] in [place]  On 08/02 for a wrist injury, she was told the xray was unclear and she needs to f/up with pcp . And also her bp was elevated . I offered her 08/13 she is in training at work , is there any time she can be seen before September

## 2019-08-21 ENCOUNTER — HOSPITAL ENCOUNTER (OUTPATIENT)
Dept: LAB | Age: 39
Discharge: HOME | End: 2019-08-21
Payer: COMMERCIAL

## 2019-08-21 ENCOUNTER — OFFICE VISIT (OUTPATIENT)
Dept: INTERNAL MEDICINE CLINIC | Age: 39
End: 2019-08-21

## 2019-08-21 VITALS
WEIGHT: 227 LBS | HEIGHT: 66 IN | OXYGEN SATURATION: 96 % | RESPIRATION RATE: 12 BRPM | BODY MASS INDEX: 36.48 KG/M2 | HEART RATE: 88 BPM | SYSTOLIC BLOOD PRESSURE: 137 MMHG | DIASTOLIC BLOOD PRESSURE: 105 MMHG | TEMPERATURE: 98.4 F

## 2019-08-21 DIAGNOSIS — E03.9 ACQUIRED HYPOTHYROIDISM: ICD-10-CM

## 2019-08-21 DIAGNOSIS — Z13.220 SCREENING FOR HYPERLIPIDEMIA: ICD-10-CM

## 2019-08-21 DIAGNOSIS — E66.01 SEVERE OBESITY (HCC): ICD-10-CM

## 2019-08-21 DIAGNOSIS — M25.532 LEFT WRIST PAIN: Primary | ICD-10-CM

## 2019-08-21 LAB
ANION GAP SERPL CALC-SCNC: 6 MMOL/L (ref 3–18)
BASOPHILS # BLD: 0 K/UL (ref 0–0.1)
BASOPHILS NFR BLD: 0 % (ref 0–2)
BUN SERPL-MCNC: 13 MG/DL (ref 7–18)
BUN/CREAT SERPL: 17 (ref 12–20)
CALCIUM SERPL-MCNC: 9.2 MG/DL (ref 8.5–10.1)
CHLORIDE SERPL-SCNC: 103 MMOL/L (ref 100–111)
CO2 SERPL-SCNC: 30 MMOL/L (ref 21–32)
CREAT SERPL-MCNC: 0.78 MG/DL (ref 0.6–1.3)
DIFFERENTIAL METHOD BLD: ABNORMAL
EOSINOPHIL # BLD: 0.4 K/UL (ref 0–0.4)
EOSINOPHIL NFR BLD: 4 % (ref 0–5)
ERYTHROCYTE [DISTWIDTH] IN BLOOD BY AUTOMATED COUNT: 12.9 % (ref 11.6–14.5)
GLUCOSE SERPL-MCNC: 87 MG/DL (ref 74–99)
HBA1C MFR BLD: 5.4 % (ref 4.2–5.6)
HCT VFR BLD AUTO: 41.4 % (ref 35–45)
HGB BLD-MCNC: 14.1 G/DL (ref 12–16)
LYMPHOCYTES # BLD: 1.6 K/UL (ref 0.9–3.6)
LYMPHOCYTES NFR BLD: 19 % (ref 21–52)
MCH RBC QN AUTO: 31.6 PG (ref 24–34)
MCHC RBC AUTO-ENTMCNC: 34.1 G/DL (ref 31–37)
MCV RBC AUTO: 92.8 FL (ref 74–97)
MONOCYTES # BLD: 0.7 K/UL (ref 0.05–1.2)
MONOCYTES NFR BLD: 8 % (ref 3–10)
NEUTS SEG # BLD: 6.1 K/UL (ref 1.8–8)
NEUTS SEG NFR BLD: 69 % (ref 40–73)
PLATELET # BLD AUTO: 389 K/UL (ref 135–420)
PMV BLD AUTO: 9.5 FL (ref 9.2–11.8)
POTASSIUM SERPL-SCNC: 4.6 MMOL/L (ref 3.5–5.5)
RBC # BLD AUTO: 4.46 M/UL (ref 4.2–5.3)
SODIUM SERPL-SCNC: 139 MMOL/L (ref 136–145)
WBC # BLD AUTO: 8.8 K/UL (ref 4.6–13.2)

## 2019-08-21 PROCEDURE — 84439 ASSAY OF FREE THYROXINE: CPT

## 2019-08-21 PROCEDURE — 83036 HEMOGLOBIN GLYCOSYLATED A1C: CPT

## 2019-08-21 PROCEDURE — 80048 BASIC METABOLIC PNL TOTAL CA: CPT

## 2019-08-21 PROCEDURE — 80061 LIPID PANEL: CPT

## 2019-08-21 PROCEDURE — 85025 COMPLETE CBC W/AUTO DIFF WBC: CPT

## 2019-08-21 RX ORDER — CYCLOBENZAPRINE HCL 10 MG
10 TABLET ORAL
COMMUNITY
Start: 2019-08-03 | End: 2019-08-21 | Stop reason: SDUPTHER

## 2019-08-21 RX ORDER — CYCLOBENZAPRINE HCL 10 MG
TABLET ORAL
Refills: 0 | COMMUNITY
Start: 2019-08-03 | End: 2020-03-12

## 2019-08-21 RX ORDER — NAPROXEN 500 MG/1
500 TABLET ORAL
COMMUNITY
Start: 2019-08-03 | End: 2019-08-21 | Stop reason: SDUPTHER

## 2019-08-21 RX ORDER — ELETRIPTAN HYDROBROMIDE 40 MG/1
TABLET, FILM COATED ORAL
Refills: 3 | COMMUNITY
Start: 2019-08-03 | End: 2019-12-30

## 2019-08-21 RX ORDER — NAPROXEN 500 MG/1
TABLET ORAL
Refills: 0 | COMMUNITY
Start: 2019-08-03 | End: 2020-08-03

## 2019-08-21 NOTE — PATIENT INSTRUCTIONS
Health Maintenance Due   Topic Date Due    DTaP/Tdap/Td series (1 - Tdap) 05/06/2000    PAP AKA CERVICAL CYTOLOGY  05/06/2000          Body Mass Index: Care Instructions  Your Care Instructions    Body mass index (BMI) can help you see if your weight is raising your risk for health problems. It uses a formula to compare how much you weigh with how tall you are. · A BMI lower than 18.5 is considered underweight. · A BMI between 18.5 and 24.9 is considered healthy. · A BMI between 25 and 29.9 is considered overweight. A BMI of 30 or higher is considered obese. If your BMI is in the normal range, it means that you have a lower risk for weight-related health problems. If your BMI is in the overweight or obese range, you may be at increased risk for weight-related health problems, such as high blood pressure, heart disease, stroke, arthritis or joint pain, and diabetes. If your BMI is in the underweight range, you may be at increased risk for health problems such as fatigue, lower protection (immunity) against illness, muscle loss, bone loss, hair loss, and hormone problems. BMI is just one measure of your risk for weight-related health problems. You may be at higher risk for health problems if you are not active, you eat an unhealthy diet, or you drink too much alcohol or use tobacco products. Follow-up care is a key part of your treatment and safety. Be sure to make and go to all appointments, and call your doctor if you are having problems. It's also a good idea to know your test results and keep a list of the medicines you take. How can you care for yourself at home? · Practice healthy eating habits. This includes eating plenty of fruits, vegetables, whole grains, lean protein, and low-fat dairy. · If your doctor recommends it, get more exercise. Walking is a good choice. Bit by bit, increase the amount you walk every day. Try for at least 30 minutes on most days of the week. · Do not smoke.  Smoking can increase your risk for health problems. If you need help quitting, talk to your doctor about stop-smoking programs and medicines. These can increase your chances of quitting for good. · Limit alcohol to 2 drinks a day for men and 1 drink a day for women. Too much alcohol can cause health problems. If you have a BMI higher than 25  · Your doctor may do other tests to check your risk for weight-related health problems. This may include measuring the distance around your waist. A waist measurement of more than 40 inches in men or 35 inches in women can increase the risk of weight-related health problems. · Talk with your doctor about steps you can take to stay healthy or improve your health. You may need to make lifestyle changes to lose weight and stay healthy, such as changing your diet and getting regular exercise. If you have a BMI lower than 18.5  · Your doctor may do other tests to check your risk for health problems. · Talk with your doctor about steps you can take to stay healthy or improve your health. You may need to make lifestyle changes to gain or maintain weight and stay healthy, such as getting more healthy foods in your diet and doing exercises to build muscle. Where can you learn more? Go to http://julio-kristi.info/. Enter S176 in the search box to learn more about \"Body Mass Index: Care Instructions. \"  Current as of: March 28, 2019  Content Version: 12.1  © 6447-9423 Healthwise, [employer]orporated. Care instructions adapted under license by [employer] (which disclaims liability or warranty for this information). If you have questions about a medical condition or this instruction, always ask your healthcare professional. [place] [address] any warranty or liability for your use of this information.

## 2019-08-21 NOTE — PROGRESS NOTES
Chief Complaint   Patient presents with    Hypothyroidism     follow up ROOM 4    ED Follow-up     [address] facility back on 8-03-19 due to Left Wrist injury while playing [address]       1. Have you been to the ER, urgent care clinic since your last visit? Hospitalized since your last visit? Yes When: 8-03-19 Where: [address] location Reason: Injury to the Left Wrist while playing [address] 2. Have you seen or consulted any other health care providers outside of the [address] [place] [address] since your last visit? Include any pap smears or colon screening. No    Patient was given a copy of the Advanced Directive and understands to bring it in once completed.   Health Maintenance Due   Topic Date Due    DTaP/Tdap/Td series (1 - Tdap) 05/06/2000    PAP AKA CERVICAL CYTOLOGY  05/06/2000

## 2019-08-21 NOTE — PROGRESS NOTES
INTERNISTS OF [facility]:  8/21/2019, MRN: Lynda Smith is a 35 y.o. female and presents to clinic for Hypothyroidism (follow up ROOM 4) and ED Follow-up ([address] facility back on 8-03-19 due to Left Wrist injury while playing [address])    Subjective: The pt is a 43yo female with h/o hypothyroidism, migraine HAs, thoracic outlet syndrome (left) s/p surgery 12/17, and kidney stones. 1. Hypothyroidism: Present for over 6 months. Taking Moneta pork thyroid 60 mg daily. She has not had TFTs checked in the past year. 2. Left Wrist Injury: On 8/3/19 she injured her left wrist while playing volleyball. Fx was ruled out. She reports shooting pain  from her wrist down to her thumb and sometimes up her forearm. Pain is not improving. She is using a wrist splint. Pain is so severe that it limits her mobility. She is right handed. 8/3/19 Left Wrist Xray: No acute fracture or malalignment of the left wrist    8/3/19 Left Shoulder Xray: No acute fracture or malalignment of the left shoulder    3. Health maintenance:   She had her Pap within the past year. We do not have records.  She is not regularly exercising at present. She tries to maintain a healthy diet, incorporating smoothies into her diet. She tries to substitute some of her meals with protein shakes.       Patient Active Problem List    Diagnosis Date Noted    Severe obesity ([address] 03/08/2019    Borderline hypertension 12/14/2018    Murmur, heart 12/14/2018    Family history of premature CAD 12/14/2018    BMI 34.0-34.9,adult 10/07/2018    Acquired hypothyroidism 10/07/2018    Migraine 10/07/2018       Current Outpatient Medications   Medication Sig Dispense Refill    cyclobenzaprine (FLEXERIL) 10 mg tablet TAKE 1 TABLET BY MOUTH EVERY 8 HOURS AS NEEDED FOR MUSCLE SPASM/PAIN  0    eletriptan (RELPAX) 40 mg tablet TAKE 1 TAB BY MOUTH ONCE AS NEEDED FOR PAIN FOR UP TO 1 DOSE. MAY REPEAT IN 2 HOURS IF NECESSARY  3    naproxen (NAPROSYN) 500 mg tablet TAKE 1 TABLET BY MOUTH TWICE A DAY AS NEEDED PAIN  0    butalbital-acetaminophen-caffeine (FIORICET, ESGIC) -40 mg per tablet TAKE 1 TABLET BY MOUTH EVERY 6 HOURS AS NEEDED FOR MIGRAINE  1    ibuprofen (MOTRIN) 600 mg tablet TAKE ONE TABLET BY MOUTH EVERY 6 HOURS AS NEEDED  0    thyroid, Pork, (ARMOUR THYROID) 60 mg tablet Take 1 Tab by mouth daily. 90 Tab 3    SUMAtriptan (IMITREX) 100 mg tablet Take 100 mg by mouth once as needed. 3       Allergies   Allergen Reactions    Codeine Hives, Itching, Swelling and Anaphylaxis       Past Medical History:   Diagnosis Date    Benign tumor of cervix     Calculus of kidney     Elevated BP without diagnosis of hypertension     Fracture of finger of right hand 2018    5th digit    H/O thoracic outlet syndrome     Headache     Migraine headache     Thyroid disease     hypothyroidism    Trauma        Past Surgical History:   Procedure Laterality Date    HX BREAST REDUCTION      bilateral    HX  SECTION      x2    HX CHOLECYSTECTOMY      HX DILATION AND CURETTAGE      x3    HX PARTIAL HYSTERECTOMY         Family History   Problem Relation Age of Onset   [facility] Migraines Mother     Hypertension Mother     Cancer Father     Heart Disease Father     Heart Attack Father     No Known Problems Sister     Diabetes Maternal Grandmother     Dementia Maternal Grandmother     No Known Problems Maternal Grandfather     No Known Problems Paternal Grandmother     No Known Problems Paternal Grandfather     Migraines Son     Migraines Daughter        Social History     Tobacco Use    Smoking status: Never Smoker    Smokeless tobacco: Never Used   Substance Use Topics    Alcohol use: Yes     Comment: rare       ROS   Review of Systems   Constitutional: Negative for chills and fever. HENT: Negative for ear pain and sore throat. Eyes: Negative for blurred vision and pain.    Respiratory: Negative for shortness of breath. Cardiovascular: Negative for chest pain. Gastrointestinal: Negative for abdominal pain, blood in stool and melena. Genitourinary: Negative for dysuria and hematuria. Musculoskeletal: Positive for joint pain. Negative for myalgias. Skin: Negative for rash. Neurological: Negative for tingling, focal weakness and headaches. Endo/Heme/Allergies: Does not bruise/bleed easily. Psychiatric/Behavioral: Negative for substance abuse. Objective     Vitals:    08/21/19 1108 08/21/19 1118   BP: (!) 153/99 (!) 137/105   Pulse: 91 88   Resp: 14 12   Temp: 98.4 °F (36.9 °C)    TempSrc: Oral    SpO2: 96%    Weight: 227 lb (103 kg)    Height: 5' 6\" (1.676 m)    PainSc:   3    PainLoc: Wrist        Physical Exam   Constitutional: She is oriented to person, place, and time and well-developed, well-nourished, and in no distress. HENT:   Head: Normocephalic and atraumatic. Right Ear: External ear normal.   Left Ear: External ear normal.   Nose: Nose normal.   Mouth/Throat: Oropharynx is clear and moist. No oropharyngeal exudate. Eyes: Pupils are equal, round, and reactive to light. Conjunctivae and EOM are normal. Right eye exhibits no discharge. Left eye exhibits no discharge. No scleral icterus. Neck: Neck supple. Cardiovascular: Normal rate, regular rhythm, normal heart sounds and intact distal pulses. Exam reveals no gallop and no friction rub. No murmur heard. Pulmonary/Chest: Effort normal and breath sounds normal. No respiratory distress. She has no wheezes. She has no rales. Abdominal: Soft. Bowel sounds are normal. She exhibits no distension. Musculoskeletal: She exhibits no edema or tenderness (BUE except along her left anterior wrist.  She has decreased flexion/extension along her left wrist secondary to pain. The remainder of her LUE exam is unremarkable. ). Lymphadenopathy:     She has no cervical adenopathy.    Neurological: She is alert and oriented to person, place, and time. She exhibits normal muscle tone. Gait normal.   Skin: Skin is warm and dry. No erythema. Psychiatric: Affect normal.   Nursing note and vitals reviewed. LABS     Lab Results   Component Value Date/Time    Hemoglobin A1c, External 5.3 11/09/2018       Assessment/Plan:   1. Acquired hypothyroidism: Stable. Continue with medication as prescribed pending labs: TFTs, lipid panel, CBC, and a BMP. ORDERS:  - TSH AND FREE T4; Future  - LIPID PANEL; Future  - CBC WITH AUTOMATED DIFF; Future  - METABOLIC PANEL, BASIC; Future    2. Health Maintenance:  Screening for HLD with a lipid panel.  I encouraged her to reduce her weight by limiting her processed food intake. I will recheck her weight at her follow-up appointment. I encouraged her to take a multivitamin daily if she is doing any type of restricted diet.  - Ordering an A1C given her obesity.  - Requesting her last PAP    ORDERS:  - LIPID PANEL; Future  - HEMOGLOBIN A1C W/O EAG; Future    3. Left wrist pain: Likely from strain. Placing a referral to Orthopedics    ORDERS:  - REFERRAL TO [address] [place] [address] Maintenance Due   Topic Date Due    DTaP/Tdap/Td series (1 - Tdap) 05/06/2000    PAP AKA CERVICAL CYTOLOGY  05/06/2000     Lab review: labs are reviewed in the EHR and ordered as mentioned above    I have discussed the diagnosis with the patient and the intended plan as seen in the above orders. The patient has received an after-visit summary and questions were answered concerning future plans. I have discussed medication side effects and warnings with the patient as well. I have reviewed the plan of care with the patient, accepted their input and they are in agreement with the treatment goals. All questions were answered. The patient understands the plan of care. Handouts provided today with above information. Pt instructed if symptoms worsen to call the office or report to the ED for continued care.   Greater than 50% of the visit time was spent in counseling and/or coordination of care. Voice recognition was used to generate this report, which may have resulted in some phonetic based errors in grammar and contents. Even though attempts were made to correct all the mistakes, some may have been missed, and remained in the body of the document.           Joey Bustillo MD

## 2019-08-22 LAB
CHOLEST SERPL-MCNC: 168 MG/DL
HDLC SERPL-MCNC: 40 MG/DL (ref 40–60)
HDLC SERPL: 4.2 {RATIO} (ref 0–5)
LDLC SERPL CALC-MCNC: 109.8 MG/DL (ref 0–100)
LIPID PROFILE,FLP: ABNORMAL
T4 FREE SERPL-MCNC: 1 NG/DL (ref 0.7–1.5)
TRIGL SERPL-MCNC: 91 MG/DL (ref ?–150)
TSH SERPL DL<=0.05 MIU/L-ACNC: 2.91 UIU/ML (ref 0.36–3.74)
VLDLC SERPL CALC-MCNC: 18.2 MG/DL

## 2019-08-25 NOTE — PROGRESS NOTES
Please let her know that her TFTs are unremarkable. Her total cholesterol is 158. Her triglycerides are 91. Her HDL is 40. Her LDL is mildly elevated at 109. Cholesterol-lowering medication is not warranted for these findings. Meanwhile, her A1c is 5.4. There is no evidence of diabetes at this time. Her kidney function labs and CBC are unremarkable.     Dr. Kim Jaimes  [facility], [address] [place] [address], [facility] [address]  Phone: [phone]  Fax: [phone]

## 2019-08-26 ENCOUNTER — TELEPHONE (OUTPATIENT)
Dept: INTERNAL MEDICINE CLINIC | Age: 39
End: 2019-08-26

## 2019-08-26 NOTE — TELEPHONE ENCOUNTER
Called and spoke to patient about message below. Patient verbalized understanding and didn't have any additional questions.

## 2019-08-26 NOTE — TELEPHONE ENCOUNTER
----- Message from Mindy Tolliver MD sent at 8/25/2019  4:54 PM EDT -----  Please let her know that her TFTs are unremarkable. Her total cholesterol is 158. Her triglycerides are 91. Her HDL is 40. Her LDL is mildly elevated at 109. Cholesterol-lowering medication is not warranted for these findings. Meanwhile, her A1c is 5.4. There is no evidence of diabetes at this time. Her kidney function labs and CBC are unremarkable.     Dr. Flores Reading  [facility] [address], [address] [place] [address] Str.  Phone: [phone]  Fax: [phone]

## 2019-08-30 ENCOUNTER — OFFICE VISIT (OUTPATIENT)
Dept: ORTHOPEDIC SURGERY | Age: 39
End: 2019-08-30

## 2019-08-30 VITALS
HEIGHT: 66 IN | DIASTOLIC BLOOD PRESSURE: 94 MMHG | HEART RATE: 80 BPM | BODY MASS INDEX: 36.48 KG/M2 | WEIGHT: 227 LBS | SYSTOLIC BLOOD PRESSURE: 148 MMHG | OXYGEN SATURATION: 98 %

## 2019-08-30 DIAGNOSIS — S63.502A SPRAIN OF LEFT WRIST, INITIAL ENCOUNTER: Primary | ICD-10-CM

## 2019-08-30 NOTE — PROGRESS NOTES
Tran Mccray is a 35 y.o. 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 Worker's Compensation and legal considerations: none filed. Vitals:    19 1557   BP: (!) 148/94   Pulse: 80   SpO2: 98%   Weight: 227 lb (103 kg)   Height: 5' 6\" (1.676 m)   PainSc:   2           Chief Complaint   Patient presents with    Wrist Pain     left         HPI: Patient comes in today with complaints of left wrist pain after sustaining a fall a few weeks prior. She was given a wrist brace. She says she is continued to have pain over the dorsal wrist.    Date of onset: 2019    Injury: Yes: Comment: Fall    Prior Treatment:  Yes: Comment: Left wrist brace    Numbness/ Tingling: No    ROS: Review of Systems - General ROS: negative  Respiratory ROS: no cough, shortness of breath, or wheezing  Cardiovascular ROS: no chest pain or dyspnea on exertion  Musculoskeletal ROS: positive for - pain in wrist - left  Neurological ROS: negative  Dermatological ROS: negative    Past Medical History:   Diagnosis Date    Benign tumor of cervix     Calculus of kidney     Elevated BP without diagnosis of hypertension     Fracture of finger of right hand 2018    5th digit    H/O thoracic outlet syndrome     Headache     Migraine headache     Thyroid disease     hypothyroidism    Trauma        Past Surgical History:   Procedure Laterality Date    HX BREAST REDUCTION      bilateral    HX  SECTION      x2    HX CHOLECYSTECTOMY      HX DILATION AND CURETTAGE      x3    HX PARTIAL HYSTERECTOMY         Current Outpatient Medications   Medication Sig Dispense Refill    triamcinolone acetonide (KENALOG) 10 mg/mL injection 1 mL by Intra artICUlar route once for 1 dose.  1 Vial 0    cyclobenzaprine (FLEXERIL) 10 mg tablet TAKE 1 TABLET BY MOUTH EVERY 8 HOURS AS NEEDED FOR MUSCLE SPASM/PAIN  0    eletriptan (RELPAX) 40 mg tablet TAKE 1 TAB BY MOUTH ONCE AS NEEDED FOR PAIN FOR UP TO 1 DOSE. MAY REPEAT IN 2 HOURS IF NECESSARY 3    naproxen (NAPROSYN) 500 mg tablet TAKE 1 TABLET BY MOUTH TWICE A DAY AS NEEDED PAIN  0    ibuprofen (MOTRIN) 600 mg tablet TAKE ONE TABLET BY MOUTH EVERY 6 HOURS AS NEEDED  0    thyroid, Pork, (ARMOUR THYROID) 60 mg tablet Take 1 Tab by mouth daily. 90 Tab 3    butalbital-acetaminophen-caffeine (FIORICET, ESGIC) -40 mg per tablet TAKE 1 TABLET BY MOUTH EVERY 6 HOURS AS NEEDED FOR MIGRAINE  1    SUMAtriptan (IMITREX) 100 mg tablet Take 100 mg by mouth once as needed. 3       Allergies   Allergen Reactions    Codeine Hives, Itching, Swelling and Anaphylaxis         PE:     Wrist: There is tenderness to palpation localized over the midcarpal and radiocarpal joint centrally and dorsally. Tenderness L R Test L R   1st Ext Comp - - Finkelstein's - -   Snuff Box - - Caro - -   2nd Ext Comp - - S-L Shear - -   S-L Joint - - L-T Shear - -   L-T Joint - - DRUJ Sup - -   6th Ext Comp - - DRUJ Pro - -   Ulnar Snuff - - DRUJ Grind - -   Fovea - - TFCC - -   STT Joint - - Mid-Carp Inst - -   FCR - - P-T Grind - -   Intersection - - ECU Sublux. - -      Dorsal Ganglion: -   Volar Ganglion: -      ROM: Full      Imaging: Outside plain films reviewed does not show any acute fracture dislocation or other significant osseous abnormality. ICD-10-CM ICD-9-CM    1. Sprain of left wrist, initial encounter S63.502A 842.00 TRIAMCINOLONE ACETONIDE INJ      triamcinolone acetonide (KENALOG) 10 mg/mL injection      DRAIN/INJECT INTERMEDIATE JOINT/BURSA       Plan:     Continue brace wear and we will do a steroid injection today to the left wrist    Discontinue brace wear after 3 to 4 weeks.     Follow-up PRN    Plan was reviewed with patient, who verbalized agreement and understanding of the plan    Raeann [address] NOTE        Chart reviewed for the following:   Mika RECINOS DO, have reviewed the History, Physical and updated the Allergic reactions for Fauzia [facility] performed immediately prior to start of procedure:   Mika RECINOS DO, have performed the following reviews on Julius Iniguez prior to the start of the procedure:            * Patient was identified by name and date of birth   * Agreement on procedure being performed was verified  * Risks and Benefits explained to the patient  * Procedure site verified and marked as necessary  * Patient was positioned for comfort  * Consent was signed and verified     Time: 16:35      Date of procedure: 8/30/2019    Procedure performed by: Ursula Cordon DO    Provider assisted by: Alexandra Frazier LPN    Patient assisted by: self    How tolerated by patient: tolerated the procedure well with no complications    Post Procedural Pain Scale: 0 - No Hurt    Comments: none    Procedure:  After consent was obtained, using sterile technique the joint was prepped. Local anesthetic used: 1% lidocaine. Kenalog 5 mg and was then injected and the needle withdrawn. The procedure was well tolerated. The patient is asked to continue to rest the area for a few more days before resuming regular activities. It may be more painful for the first 1-2 days. Watch for fever, or increased swelling or persistent pain in the joint. Call or return to clinic prn if such symptoms occur or there is failure to improve as anticipated.

## 2019-12-30 RX ORDER — ELETRIPTAN HYDROBROMIDE 40 MG/1
TABLET, FILM COATED ORAL
Qty: 12 TAB | Refills: 3 | Status: SHIPPED | OUTPATIENT
Start: 2019-12-30 | End: 2020-03-12

## 2020-03-12 ENCOUNTER — HOSPITAL ENCOUNTER (EMERGENCY)
Age: 40
Discharge: HOME | End: 2020-03-12
Attending: EMERGENCY MEDICINE
Payer: COMMERCIAL

## 2020-03-12 VITALS
OXYGEN SATURATION: 97 % | WEIGHT: 225 LBS | SYSTOLIC BLOOD PRESSURE: 127 MMHG | TEMPERATURE: 98.8 F | RESPIRATION RATE: 19 BRPM | BODY MASS INDEX: 34.1 KG/M2 | HEIGHT: 68 IN | HEART RATE: 70 BPM | DIASTOLIC BLOOD PRESSURE: 86 MMHG

## 2020-03-12 DIAGNOSIS — B34.9 VIRAL SYNDROME: Primary | ICD-10-CM

## 2020-03-12 LAB
FLUAV AG NPH QL IA: NEGATIVE
FLUBV AG NOSE QL IA: NEGATIVE

## 2020-03-12 PROCEDURE — 99284 EMERGENCY DEPT VISIT MOD MDM: CPT

## 2020-03-12 PROCEDURE — 87804 INFLUENZA ASSAY W/OPTIC: CPT

## 2020-03-12 NOTE — LETTER
[facility] EMERGENCY DEPT 
[address] 
[address] [facility] [id number] [phone] Work/School Note Date: 3/12/2020 To Whom It May concern: 
 
Tio Russell was seen and treated today in the emergency room by the following provider(s): 
Attending Provider: Mardeen Holstein, MD.   
 
Tio Russell may return to work on Monday, March 16th - with no restrictions Sincerely, 
 
 
 
 
Martita Pandey MD

## 2020-03-12 NOTE — ED TRIAGE NOTES
Pt arrives with c/o ongoing sinus infection and dizziness. Pt states head is excruciating pain at eyes and she feels like she is swimming. Pt states N/V. Pt denies dysuria/diarrhea.

## 2020-03-12 NOTE — ED PROVIDER NOTES
35 y.o co sinusitis frontal - already currently on antibiotics and steroids by PCM, denies fever, chills, nausea, vomiitng, chest pain SOB - states occasional headache and feels head is full. Stats concern for influenza    This note dictated in dragon software.   There may be grammatical and spelling errors that are missed during review    Review of systems: all other systems negative unless otherwise specified             Past Medical History:   Diagnosis Date    Benign tumor of cervix     Calculus of kidney     Elevated BP without diagnosis of hypertension     Fracture of finger of right hand 2018    5th digit    H/O thoracic outlet syndrome     Headache     Migraine headache     Thyroid disease     hypothyroidism    Trauma        Past Surgical History:   Procedure Laterality Date    HX BREAST REDUCTION      bilateral    HX  SECTION      x2    HX CHOLECYSTECTOMY      HX DILATION AND CURETTAGE      x3    HX PARTIAL HYSTERECTOMY           Family History:   Problem Relation Age of Onset   [place] Migraines Mother     Hypertension Mother     Cancer Father     Heart Disease Father     Heart Attack Father     No Known Problems Sister     Diabetes Maternal Grandmother     Dementia Maternal Grandmother     No Known Problems Maternal Grandfather     No Known Problems Paternal Grandmother     No Known Problems Paternal Grandfather     Migraines Son     Migraines Daughter        Social History     Socioeconomic History    Marital status:      Spouse name: Not on file    Number of children: Not on file    Years of education: Not on file    Highest education level: Not on file   Occupational History    Not on file   Social Needs    Financial resource strain: Not on file    Food insecurity     Worry: Not on file     Inability: Not on file    Transportation needs     Medical: Not on file     Non-medical: Not on file   Tobacco Use    Smoking status: Never Smoker    Smokeless tobacco: Never Used   Substance and Sexual Activity    Alcohol use: Yes     Comment: rare    Drug use: Never    Sexual activity: Yes     Partners: Male     Birth control/protection: None   Lifestyle    Physical activity     Days per week: Not on file     Minutes per session: Not on file    Stress: Not on file   Relationships    Social connections     Talks on phone: Not on file     Gets together: Not on file     Attends Muslim service: Not on file     Active member of club or organization: Not on file     Attends meetings of clubs or organizations: Not on file     Relationship status: Not on file    Intimate partner violence     Fear of current or ex partner: Not on file     Emotionally abused: Not on file     Physically abused: Not on file     Forced sexual activity: Not on file   Other Topics Concern    Not on file   Social History Narrative    Not on file         ALLERGIES: Codeine    Review of Systems   Constitutional: Negative for fatigue and fever. HENT: Positive for sinus pressure and sinus pain. Negative for rhinorrhea and sore throat. Eyes: Negative for redness and visual disturbance. Respiratory: Negative for shortness of breath. Cardiovascular: Negative for chest pain. Gastrointestinal: Negative for abdominal pain, diarrhea, nausea and vomiting. Genitourinary: Negative for dysuria. Neurological: Positive for headaches. All other systems reviewed and are negative. Vitals:    03/12/20 0815   BP: (!) 150/96   Pulse: 75   Resp: 20   Temp: 98.8 °F (37.1 °C)   SpO2: 98%   Weight: 102.1 kg (225 lb)   Height: 5' 8\" (1.727 m)            Physical Exam  Vitals signs and nursing note reviewed. Constitutional:       General: She is not in acute distress. Appearance: Normal appearance. She is not ill-appearing, toxic-appearing or diaphoretic. HENT:      Head: Normocephalic and atraumatic.       Right Ear: Tympanic membrane normal.      Left Ear: Tympanic membrane normal. Nose: No congestion or rhinorrhea. Right Sinus: Maxillary sinus tenderness present. Left Sinus: Maxillary sinus tenderness present. Mouth/Throat:      Mouth: Mucous membranes are moist.      Pharynx: No oropharyngeal exudate or posterior oropharyngeal erythema. Neck:      Musculoskeletal: Normal range of motion. No neck rigidity. Cardiovascular:      Rate and Rhythm: Normal rate and regular rhythm. Pulmonary:      Effort: Pulmonary effort is normal. No respiratory distress. Breath sounds: Normal breath sounds. Musculoskeletal: Normal range of motion. Skin:     General: Skin is warm. Neurological:      General: No focal deficit present. Mental Status: She is alert.    Psychiatric:         Mood and Affect: Mood normal.          MDM  Number of Diagnoses or Management Options  Viral syndrome:   Diagnosis management comments: 53-year-old female likely viral syndrome reassuring vital signs she is afebrile currently she complains of some diffuse vague symptoms including occasional headache and not feeling well with some sinus pressure occasional nausea but no diarrhea chest pain or shortness of breath afebrile her flu swabs today are negative chest exam unremarkable believe low risk for bronchitis or pneumonia recommended for her to stay home from work return to work on Monday         Procedures

## 2020-03-12 NOTE — ED NOTES
Assumed care of pt. Pt is A&OX4. And appears in NAD. Pt is ambulatory. Breathing is spontaneous and unlabored. Equal chest rise/fall. Skin is warm and dry. Pt is on full monitor. VVS. NSR. Pt recently dx with sinus infx from [facility] on Monday. Did not get a CT scan. C/o dizziness and feels like she has \"pressure on her brain. \" States yesterday she was experiencing confusion and put her phone in the washer and started scribbling on a white board. States that everything started on Monday. Reports vomiting when she gets dizzy. Has a hx of migraines but states this is not the same.

## 2020-07-23 ENCOUNTER — VIRTUAL VISIT (OUTPATIENT)
Dept: INTERNAL MEDICINE CLINIC | Age: 40
End: 2020-07-23

## 2020-07-23 DIAGNOSIS — Z82.49 FAMILY HISTORY OF HEART DISEASE: ICD-10-CM

## 2020-07-23 DIAGNOSIS — N92.0 SPOTTING: ICD-10-CM

## 2020-07-23 DIAGNOSIS — R73.9 HYPERGLYCEMIA: ICD-10-CM

## 2020-07-23 DIAGNOSIS — R07.89 ATYPICAL CHEST PAIN: ICD-10-CM

## 2020-07-23 DIAGNOSIS — R79.89 ABNORMAL CBC: ICD-10-CM

## 2020-07-23 DIAGNOSIS — G43.809 OTHER MIGRAINE WITHOUT STATUS MIGRAINOSUS, NOT INTRACTABLE: Primary | ICD-10-CM

## 2020-07-23 DIAGNOSIS — E03.9 ACQUIRED HYPOTHYROIDISM: ICD-10-CM

## 2020-07-23 NOTE — PROGRESS NOTES
Niraj Aguillon is a 38 y.o. female who was seen by synchronous (real-time) audio-video technology on 7/23/2020. Assessment & Plan:   1. Abnormal CBC  Checking a CBC. ORDERS:  - CBC WITH AUTOMATED DIFF; Future    2. Hyperglycemia:   Checking an A1c. ORDERS:  - HEMOGLOBIN A1C W/O EAG; Future    3. Migraine HAs: Refractory to multiple therapeutics. Referral to Neurology for medication recommendations. ORDERS:  - REFERRAL TO NEUROLOGY    4. Atypical CP: + Family history of heart disease  Referral to Cardiology as she is reporting a change in her chronic symptoms. ORDERS:  - REFERRAL TO CARDIOLOGY    5. Acquired hypothyroidism  Checking TFTs. Continue with Rx pending results. ORDERS:  - TSH AND FREE T4; Future    6. Spotting: S/p hysterectomy. +Pelvic pain. Referral to GYN. ORDERS:  - REFERRAL TO GYNECOLOGY      Lab review: labs are reviewed in the EHR and ordered as mentioned above. I have discussed the diagnosis with the patient and the intended plan as seen in the above orders. I have discussed medication side effects and warnings with the patient as well. I have reviewed the plan of care with the patient, accepted their input and they are in agreement with the treatment goals. All questions were answered. The patient understands the plan of care. Pt instructed if symptoms worsen to call the office or report to the ED for continued care. Greater than 50% of the visit time was spent in counseling and/or coordination of care. Voice recognition was used to generate this report, which may have resulted in some phonetic based errors in grammar and contents. Even though attempts were made to correct all the mistakes, some may have been missed, and remained in the body of the document.       Subjective:   Niraj Aguillon was seen for   Chief Complaint   Patient presents with   [facility] ED Follow-up     The pt is a 40yo female with h/o hypothyroidism, migraine HAs, thoracic outlet syndrome (left) s/p surgery 12/17, and kidney stones. 1.  Hypothyroidism: Taking Mullens pork thyroid 60 mg daily. Her last TFTs have been over 6 months ago. 2. Migraine HAs: Last year she was evaluated for a right-sided headache lasting days, associated with right-sided facial paresthesias. She went to the ED and was diagnosed with a migraine HA. She was told that this year when she had similar sx, that her sx were also from a migraine HA. S/p botox injections and multiple migraine HA rx in the past.  She had similar symptoms most recently in  Early July. During her most recent ED visit at a [facility] facility earlier this month, the patient was told that her blood pressure was elevated. Note that her BP was reassuring per our system in March. Her weight is unchanged since March. No drug use. No tobacco use. She has not had migraine headaches often but when she does, then will last days and symptoms are severe. They are also associated with nausea and vomiting symptoms. Headaches are always right-sided. No blurry vision. No that she had an unremarkable head CT in 2010. BP Readings from Last 3 Encounters:   03/12/20 127/86   08/30/19 (!) 148/94   08/21/19 (!) 137/105     3. Chest Pain Hx: She has stabbing pain in her chest with exertion over the past year. Sx last 1 min or so. No SOB. No palpitations. Her father had a CABG at age 39. Although she has had similar symptoms in the past, symptoms have been slightly worse this year. Note that she had an echocardiogram in 2018. Findings are listed below. Due to similar symptoms she had at the time of her ED visit earlier this month, an EKG was obtained. The report is listed below.     7/10/20 EKG:   ABNORMAL ECG -      Impression SR-Sinus rhythm-normal P axis, V-rate 50-99     Impression PLAE-Probable left atrial enlargement-P >50mS, <-0.10mV V1     Impression IMIO-Inferior infarct, old-Q >35mS, II III aVF     Impression AMIC-Consider anterior infarct-Q >30mS in V2-V5      12/2/7/18 Stress Echocardiogram: Baseline ECG: Normal sinus rhythm, myocardial infarction. The infarction is located in the anterior regions. .With sinus arrhythmia; possible left enlargement;low QRS cannot r/o anterior infarct age undetermined. Negative stress electrocardiogram. Stress test results correlate with a low risk of inducible myocardial ischemia supported by the following factors: high exercise workload achieved, absence of anginal symptoms during stress test and atypical clinical presentation. Clinical correlation is advised, but in the absence of progressive or typical angina, further cardiac evaluation for ischemic heart disease may not be necessary. Stress echocardiogram is normal. Low risk study. 12/27/18 Echocardiogram: Estimated left ventricular ejection fraction is 56 - 60%. Visually measured ejection fraction. Normal left ventricular wall motion, no regional wall motion abnormality noted. There is no evidence of pulmonary hypertension. 4. Spotting: She has pelvic pain off/on over the past several months despite a history of a hysterectomy. +H/o endometriosis. Spotting is every 2-3 months. No d/c or pain with sex. 5.  Abnormal CBC: Her most recent ED visit labs, from earlier this month, show that her white blood cell count was mildly elevated in the 12,000 range. 6.  Hyperglycemia: Her most recent ED visit labs show that her blood glucose was mildly elevated at 102. Prior to Admission medications    Medication Sig Start Date End Date Taking? Authorizing Provider   naproxen (NAPROSYN) 500 mg tablet TAKE 1 TABLET BY MOUTH TWICE A DAY AS NEEDED PAIN 8/3/19   Provider, Historical   SUMAtriptan (IMITREX) 100 mg tablet Take 100 mg by mouth once as needed.  8/9/18   Provider, Historical   ibuprofen (MOTRIN) 600 mg tablet TAKE ONE TABLET BY MOUTH EVERY 6 HOURS AS NEEDED 7/18/18   Provider, Historical     Allergies   Allergen Reactions    Codeine Hives, Itching, Swelling and Anaphylaxis     Past Medical History:   Diagnosis Date    Benign tumor of cervix     Calculus of kidney     Elevated BP without diagnosis of hypertension     Fracture of finger of right hand 2018    5th digit    H/O thoracic outlet syndrome     Headache     Migraine headache     Thyroid disease     hypothyroidism    Trauma      Past Surgical History:   Procedure Laterality Date    HX BREAST REDUCTION      bilateral    HX  SECTION      x2    HX CHOLECYSTECTOMY      HX DILATION AND CURETTAGE      x3    HX PARTIAL HYSTERECTOMY       Family History   Problem Relation Age of Onset   [facility] Migraines Mother     Hypertension Mother     Cancer Father     Heart Disease Father     Heart Attack Father     No Known Problems Sister     Diabetes Maternal Grandmother     Dementia Maternal Grandmother     No Known Problems Maternal Grandfather     No Known Problems Paternal Grandmother     No Known Problems Paternal Grandfather     Migraines Son     Migraines Daughter      Social History     Socioeconomic History    Marital status:      Spouse name: Not on file    Number of children: Not on file    Years of education: Not on file    Highest education level: Not on file   Tobacco Use    Smoking status: Never Smoker    Smokeless tobacco: Never Used   Substance and Sexual Activity    Alcohol use: Yes     Comment: rare    Drug use: Never    Sexual activity: Yes     Partners: Male     Birth control/protection: None       ROS:  Gen: No fever/chills  HEENT: No sore throat, eye pain, ear pain, or congestion. The HPI for headache history. CV: See HPI  Resp: No cough/SOB  GI: +lower abdominal pain from endometreosis   : No hematuria/dysuria  Derm: No rash  Neuro: See HPI.   Musc: No new myalgias/jt pain  Psych: No depression sx      Objective:     General: alert, cooperative, no distress   Mental  status: mental status: alert, oriented to person, place, and time, normal mood, behavior, speech, dress, motor activity, and thought processes   Resp: resp: normal effort and no respiratory distress   Neuro: neuro: no gross deficits   Skin: skin: no discoloration or lesions of concern on visible areas     LABS:  Lab Results   Component Value Date/Time    Sodium 139 08/21/2019 11:52 AM    Potassium 4.6 08/21/2019 11:52 AM    Chloride 103 08/21/2019 11:52 AM    CO2 30 08/21/2019 11:52 AM    Anion gap 6 08/21/2019 11:52 AM    Glucose 87 08/21/2019 11:52 AM    BUN 13 08/21/2019 11:52 AM    Creatinine 0.78 08/21/2019 11:52 AM    BUN/Creatinine ratio 17 08/21/2019 11:52 AM    GFR est AA >60 08/21/2019 11:52 AM    GFR est non-AA >60 08/21/2019 11:52 AM    Calcium 9.2 08/21/2019 11:52 AM       Lab Results   Component Value Date/Time    Cholesterol, total 168 08/21/2019 11:52 AM    HDL Cholesterol 40 08/21/2019 11:52 AM    LDL, calculated 109.8 (H) 08/21/2019 11:52 AM    VLDL, calculated 18.2 08/21/2019 11:52 AM    Triglyceride 91 08/21/2019 11:52 AM    CHOL/HDL Ratio 4.2 08/21/2019 11:52 AM       Lab Results   Component Value Date/Time    WBC 8.8 08/21/2019 11:52 AM    HGB 14.1 08/21/2019 11:52 AM    HCT 41.4 08/21/2019 11:52 AM    PLATELET 426 80/99/9910 11:52 AM    MCV 92.8 08/21/2019 11:52 AM       Lab Results   Component Value Date/Time    Hemoglobin A1c 5.4 08/21/2019 11:52 AM    Hemoglobin A1c, External 5.3 11/09/2018       Lab Results   Component Value Date/Time    TSH 2.91 08/21/2019 11:52 AM           Due to this being a TeleHealth  evaluation, many elements of the physical examination are unable to be assessed. The pt was seen by synchronous (real-time) audio-video technology, and/or her healthcare decision maker, is aware that this patient-initiated, Telehealth encounter is a billable service, with coverage as determined by her insurance carrier. She is aware that she may receive a bill and has provided verbal consent to proceed: Yes.      The pt is being evaluated by a video visit encounter for concerns as above. A caregiver was present when appropriate. Due to this being a TeleHealth encounter (During CKKSK-44 public health emergency), evaluation of the following organ systems was limited: Vitals/Constitutional/EENT/Resp/CV/GI//MS/Neuro/Skin/Heme-Lymph-Imm. Pursuant to the emergency declaration under the [facility] waiver authority and the [employer] Act, this Virtual  Visit was conducted, with patient's (and/or legal guardian's) consent, to reduce the patient's risk of exposure to COVID-19 and provide necessary medical care. Services were provided through a video synchronous discussion virtually to substitute for in-person clinic visit. Patient and provider were located at their individual homes. We discussed the expected course, resolution and complications of the diagnosis(es) in detail. Medication risks, benefits, costs, interactions, and alternatives were discussed as indicated. I advised her to contact the office if her condition worsens, changes or fails to improve as anticipated. She expressed understanding with the diagnosis(es) and plan.      Gunner Oshea MD

## 2020-08-03 ENCOUNTER — VIRTUAL VISIT (OUTPATIENT)
Dept: NEUROLOGY | Age: 40
End: 2020-08-03

## 2020-08-03 DIAGNOSIS — R51.9 WORSENING HEADACHES: ICD-10-CM

## 2020-08-03 DIAGNOSIS — R29.810 FACIAL WEAKNESS: ICD-10-CM

## 2020-08-03 DIAGNOSIS — R20.2 NUMBNESS AND TINGLING OF RIGHT FACE: ICD-10-CM

## 2020-08-03 DIAGNOSIS — G43.009 ATYPICAL MIGRAINE: Primary | ICD-10-CM

## 2020-08-03 DIAGNOSIS — R20.0 NUMBNESS AND TINGLING OF RIGHT FACE: ICD-10-CM

## 2020-08-03 RX ORDER — BUTALBITAL, ACETAMINOPHEN AND CAFFEINE 50; 325; 40 MG/1; MG/1; MG/1
1 TABLET ORAL
Qty: 15 TAB | Refills: 1 | Status: SHIPPED | OUTPATIENT
Start: 2020-08-03 | End: 2021-06-25

## 2020-08-03 RX ORDER — LEVOTHYROXINE AND LIOTHYRONINE 38; 9 UG/1; UG/1
TABLET ORAL DAILY
COMMUNITY
End: 2022-01-25

## 2020-08-03 RX ORDER — ELETRIPTAN HYDROBROMIDE 40 MG/1
TABLET, FILM COATED ORAL
COMMUNITY
Start: 2020-05-20 | End: 2020-08-03

## 2020-08-03 RX ORDER — GALCANEZUMAB 120 MG/ML
INJECTION, SOLUTION SUBCUTANEOUS
Qty: 2 SYRINGE | Refills: 5 | Status: SHIPPED | OUTPATIENT
Start: 2020-08-03 | End: 2021-06-25

## 2020-08-03 NOTE — PROGRESS NOTES
Ava Guthrie presents today for   Chief Complaint   Patient presents with    Migraine     follow up       Is someone accompanying this pt? Virtual visit [phone]. Is the patient using any DME equipment during OV? no    Depression Screening:  3 most recent PHQ Screens 8/21/2019   Little interest or pleasure in doing things Not at all   Feeling down, depressed, irritable, or hopeless Not at all   Total Score PHQ 2 0       Learning Assessment:  Learning Assessment 8/21/2019   PRIMARY LEARNER Patient   HIGHEST LEVEL OF EDUCATION - PRIMARY LEARNER  2 YEARS [facility] PRIMARY LEARNER NONE   CO-LEARNER CAREGIVER No   PRIMARY LANGUAGE ENGLISH   LEARNER PREFERENCE PRIMARY DEMONSTRATION   ANSWERED BY patient   RELATIONSHIP SELF       Abuse Screening:  Abuse Screening Questionnaire 8/21/2019   Do you ever feel afraid of your partner? N   Are you in a relationship with someone who physically or mentally threatens you? N   Is it safe for you to go home? Y       Fall Risk  No flowsheet data found. Coordination of Care:  1. Have you been to the ER, urgent care clinic since your last visit? Hospitalized since your last visit? no    2. Have you seen or consulted any other health care providers outside of the [address] [place] [address] since your last visit? Include any pap smears or colon screening.  Benedict Joaquin,
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
## 2020-08-05 ENCOUNTER — HOSPITAL ENCOUNTER (EMERGENCY)
Age: 40
Discharge: HOME | End: 2020-08-05
Attending: EMERGENCY MEDICINE
Payer: COMMERCIAL

## 2020-08-05 ENCOUNTER — APPOINTMENT (OUTPATIENT)
Dept: GENERAL RADIOLOGY | Age: 40
End: 2020-08-05
Attending: EMERGENCY MEDICINE
Payer: COMMERCIAL

## 2020-08-05 VITALS
SYSTOLIC BLOOD PRESSURE: 182 MMHG | BODY MASS INDEX: 34.1 KG/M2 | HEART RATE: 89 BPM | TEMPERATURE: 98.5 F | WEIGHT: 225 LBS | HEIGHT: 68 IN | OXYGEN SATURATION: 99 % | RESPIRATION RATE: 18 BRPM | DIASTOLIC BLOOD PRESSURE: 118 MMHG

## 2020-08-05 DIAGNOSIS — M25.571 ACUTE RIGHT ANKLE PAIN: Primary | ICD-10-CM

## 2020-08-05 DIAGNOSIS — M79.671 RIGHT FOOT PAIN: ICD-10-CM

## 2020-08-05 PROCEDURE — 73630 X-RAY EXAM OF FOOT: CPT

## 2020-08-05 PROCEDURE — 99283 EMERGENCY DEPT VISIT LOW MDM: CPT

## 2020-08-05 PROCEDURE — 73600 X-RAY EXAM OF ANKLE: CPT

## 2020-08-05 RX ORDER — ACETAMINOPHEN 325 MG/1
650 TABLET ORAL
Qty: 20 TAB | Refills: 0 | Status: SHIPPED | OUTPATIENT
Start: 2020-08-05 | End: 2022-04-07

## 2020-08-05 RX ORDER — NAPROXEN 500 MG/1
500 TABLET ORAL 2 TIMES DAILY WITH MEALS
Qty: 20 TAB | Refills: 0 | Status: SHIPPED | OUTPATIENT
Start: 2020-08-05 | End: 2020-08-15

## 2020-08-06 ENCOUNTER — APPOINTMENT (OUTPATIENT)
Dept: INTERNAL MEDICINE CLINIC | Age: 40
End: 2020-08-06

## 2020-08-06 ENCOUNTER — HOSPITAL ENCOUNTER (OUTPATIENT)
Dept: LAB | Age: 40
Discharge: HOME | End: 2020-08-06
Payer: COMMERCIAL

## 2020-08-06 DIAGNOSIS — E03.9 ACQUIRED HYPOTHYROIDISM: ICD-10-CM

## 2020-08-06 DIAGNOSIS — R79.89 ABNORMAL CBC: ICD-10-CM

## 2020-08-06 DIAGNOSIS — R73.9 HYPERGLYCEMIA: ICD-10-CM

## 2020-08-06 LAB
BASOPHILS # BLD: 0 K/UL (ref 0–0.1)
BASOPHILS NFR BLD: 0 % (ref 0–2)
DIFFERENTIAL METHOD BLD: ABNORMAL
EOSINOPHIL # BLD: 0.3 K/UL (ref 0–0.4)
EOSINOPHIL NFR BLD: 4 % (ref 0–5)
ERYTHROCYTE [DISTWIDTH] IN BLOOD BY AUTOMATED COUNT: 12.7 % (ref 11.6–14.5)
HBA1C MFR BLD: 5.5 % (ref 4.2–5.6)
HCT VFR BLD AUTO: 39.9 % (ref 35–45)
HGB BLD-MCNC: 13.5 G/DL (ref 12–16)
LYMPHOCYTES # BLD: 1.5 K/UL (ref 0.9–3.6)
LYMPHOCYTES NFR BLD: 19 % (ref 21–52)
MCH RBC QN AUTO: 31.6 PG (ref 24–34)
MCHC RBC AUTO-ENTMCNC: 33.8 G/DL (ref 31–37)
MCV RBC AUTO: 93.4 FL (ref 74–97)
MONOCYTES # BLD: 0.6 K/UL (ref 0.05–1.2)
MONOCYTES NFR BLD: 7 % (ref 3–10)
NEUTS SEG # BLD: 5.4 K/UL (ref 1.8–8)
NEUTS SEG NFR BLD: 70 % (ref 40–73)
PLATELET # BLD AUTO: 412 K/UL (ref 135–420)
PMV BLD AUTO: 9.1 FL (ref 9.2–11.8)
RBC # BLD AUTO: 4.27 M/UL (ref 4.2–5.3)
T4 FREE SERPL-MCNC: 0.9 NG/DL (ref 0.7–1.5)
TSH SERPL DL<=0.05 MIU/L-ACNC: 1.18 UIU/ML (ref 0.36–3.74)
WBC # BLD AUTO: 7.8 K/UL (ref 4.6–13.2)

## 2020-08-06 PROCEDURE — 85025 COMPLETE CBC W/AUTO DIFF WBC: CPT

## 2020-08-06 PROCEDURE — 84439 ASSAY OF FREE THYROXINE: CPT

## 2020-08-06 PROCEDURE — 83036 HEMOGLOBIN GLYCOSYLATED A1C: CPT

## 2020-08-06 PROCEDURE — 36415 COLL VENOUS BLD VENIPUNCTURE: CPT

## 2020-08-06 NOTE — ED NOTES
Pt hypertensive in triage. States she was seen recently in the ED for a HA and was told she had an abnormal EKG/ is following up with cardiology and neurology for persistent HAs. Does not have HA, SOB, or CP at this time.

## 2020-08-06 NOTE — ED TRIAGE NOTES
Pt to triage c/o right ankle and foot pain. States it initially started hurting six days ago, denies injury  Then this morning she accidentally stepped on a baseball and rolled the right ankle with worsened swelling, pain, and difficulty ambulating.

## 2020-08-06 NOTE — ED PROVIDER NOTES
EMERGENCY DEPARTMENT HISTORY AND PHYSICAL EXAM    9:17 PM      Date: 8/5/2020  Patient Name: Lidia Mann    History of Presenting Illness     Chief Complaint   Patient presents with    Foot Pain         History Provided By: Patient    Additional History (Context): Lidia Mann is a 38 y.o. female with PMHx as noted below who presents with complaints of foot and ankle pain after stepping on a baseball and rolling her ankle earlier this morning. Patient states she was walking down the bog[place] stepped on a child's baseball and rolled her ankle. She states that since then she has had a constant aching pain to the right ankle. She states that she is able to ambulate, but it is painful to bear weight. Patient denies any numbness or tingling, denies any loss of sensation. Patient denies any head trauma or loss of consciousness during the fall. PCP: Silvana Barlow MD    Current Outpatient Medications   Medication Sig Dispense Refill    acetaminophen (TYLENOL) 325 mg tablet Take 2 Tabs by mouth every six (6) hours as needed for Pain. 20 Tab 0    naproxen (Naprosyn) 500 mg tablet Take 1 Tab by mouth two (2) times daily (with meals) for 10 days. 20 Tab 0    thyroid, Pork, ([place] Thyroid) 60 mg tablet Take  by mouth daily.  butalbital-acetaminophen-caffeine (FIORICET, ESGIC) -40 mg per tablet Take 1 Tab by mouth every six (6) hours as needed for Headache.  15 Tab 1    Emgality Pen 120 mg/mL injection First month 240 mg SQ inj followed by 120 mg SQ inj q month thereafter 2 Syringe 5       Past History     Past Medical History:  Past Medical History:   Diagnosis Date    Benign tumor of cervix     Calculus of kidney     Elevated BP without diagnosis of hypertension     Fracture of finger of right hand 07/17/2018    5th digit    H/O thoracic outlet syndrome     Headache     Migraine headache     Thyroid disease     hypothyroidism    Trauma        Past Surgical History:  Past Surgical History:   Procedure Laterality Date    HX BREAST REDUCTION      bilateral    HX  SECTION      x2    HX CHOLECYSTECTOMY      HX DILATION AND CURETTAGE      x3    HX PARTIAL HYSTERECTOMY         Family History:  Family History   Problem Relation Age of Onset   [facility] Migraines Mother     Hypertension Mother     Cancer Father     Heart Disease Father     Heart Attack Father     No Known Problems Sister     Diabetes Maternal Grandmother     Dementia Maternal Grandmother     No Known Problems Maternal Grandfather     No Known Problems Paternal Grandmother     No Known Problems Paternal Grandfather     Migraines Son     Migraines Daughter        Social History:  Social History     Tobacco Use    Smoking status: Never Smoker    Smokeless tobacco: Never Used   Substance Use Topics    Alcohol use: Yes     Comment: rare    Drug use: Never       Allergies: Allergies   Allergen Reactions    Codeine Hives, Itching, Swelling and Anaphylaxis    Shellfish Derived Shortness of Breath         Review of Systems       Review of Systems   Constitutional: Negative. HENT: Negative. Respiratory: Negative. Cardiovascular: Negative. Gastrointestinal: Negative. Genitourinary: Negative. Musculoskeletal: Positive for arthralgias and joint swelling. Skin: Negative. Neurological: Negative. All other systems reviewed and are negative. Physical Exam     Visit Vitals  BP (!) 182/118 (BP 1 Location: Right arm, BP Patient Position: Sitting)   Pulse 89   Temp 98.5 °F (36.9 °C)   Resp 18   Ht 5' 8\" (1.727 m)   Wt 102.1 kg (225 lb)   SpO2 99%   BMI 34.21 kg/m²         Physical Exam  Vitals signs reviewed. Constitutional:       General: She is not in acute distress. Appearance: Normal appearance. She is not ill-appearing, toxic-appearing or diaphoretic. HENT:      Head: Normocephalic and atraumatic.       Right Ear: External ear normal.      Left Ear: External ear normal. Nose: Nose normal.      Mouth/Throat:      Mouth: Mucous membranes are moist.      Pharynx: Oropharynx is clear. Eyes:      Extraocular Movements: Extraocular movements intact. Neck:      Musculoskeletal: Neck supple. Cardiovascular:      Rate and Rhythm: Normal rate and regular rhythm. Pulses: Normal pulses. Heart sounds: Normal heart sounds. No murmur. Pulmonary:      Effort: Pulmonary effort is normal.      Breath sounds: Normal breath sounds. No wheezing, rhonchi or rales. Musculoskeletal: Normal range of motion. General: Tenderness and signs of injury present. No swelling or deformity. Right lower leg: No edema. Left lower leg: No edema. Comments: Tenderness palpation of the right foot overlying the tarsal bones. There is no bruising or ecchymosis. No crepitus or step-off. Patient has full range of motion of all toes of the right foot, full range of motion right ankle, full range of motion right knee. There is no obvious deformity. Skin:     General: Skin is warm and dry. Capillary Refill: Capillary refill takes less than 2 seconds. Findings: No bruising or erythema. Neurological:      General: No focal deficit present. Mental Status: She is alert and oriented to person, place, and time. Cranial Nerves: No cranial nerve deficit. Sensory: No sensory deficit. Motor: No weakness. Diagnostic Study Results     Labs -  No results found for this or any previous visit (from the past 12 hour(s)). Radiologic Studies -   XR ANKLE RT AP/LAT    (Results Pending)   XR FOOT RT MIN 3 V    (Results Pending)         Medical Decision Making   I am the first provider for this patient. I reviewed the vital signs, available nursing notes, past medical history, past surgical history, family history and social history. Vital Signs-Reviewed the patient's vital signs.     Records Reviewed: Nursing Notes and Old Medical Records (Time of Review: 9:17 PM)    ED Course: Progress Notes, Reevaluation, and Consults:  2117 met with patient, reviewed history, performed physical exam.  Physical exam is as noted above. There is tenderness palpation over the tarsal bones. Otherwise patient has good capillary refill to all toes of the right foot, full range of motion of all toes of the right foot, full range of motion of right ankle, full range of motion right knee. Radiographs of the right foot and ankle are unremarkable per my interpretation, no obvious fractures or malalignment. Will place patient in a postoperative shoe and referred to orthopedics for follow-up. Provider Notes (Medical Decision Making):   14-year-old female seen in the emergency department for complaints of right foot ankle pain after stepping on a baseball earlier this morning. Physical exam as noted above. No obvious malalignment or fractures noted on radiographs obtained in the emergency department. Patient was placed in a postoperative shoe. She will be referred to orthopedics for follow-up. She was advised to follow-up with her primary care provider for reassessment. She was advised to return to the ED if symptoms worsen, or as needed. Diagnosis     Clinical Impression:   1. Acute right ankle pain    2. Right foot pain        Disposition: home     Follow-up Information     Follow up With Specialties Details Why Contact Info    Silvana Barlow MD Family Medicine Call in 1 day For follow up regarding ER visit. [facility]  [address]  [address] [facility]  Call in 1 day  [id number] EMERGENCY DEPT Emergency Medicine  Immediately if symptoms worsen, As needed.  [address]           Patient's Medications   Start Taking    ACETAMINOPHEN (TYLENOL) 325 MG TABLET    Take 2 Tabs by mouth every six (6) hours as needed for Pain. NAPROXEN (NAPROSYN) 500 MG TABLET    Take 1 Tab by mouth two (2) times daily (with meals) for 10 days. Continue Taking    BUTALBITAL-ACETAMINOPHEN-CAFFEINE (FIORICET, ESGIC) -40 MG PER TABLET    Take 1 Tab by mouth every six (6) hours as needed for Headache. EMGALITY  MG/ML INJECTION    First month 240 mg SQ inj followed by 120 mg SQ inj q month thereafter    THYROID, PORK, ([place] THYROID) 60 MG TABLET    Take  by mouth daily. These Medications have changed    No medications on file   Stop Taking    No medications on file     Terrell Hassan PA-C    Dictation disclaimer:  Please note that this dictation was completed with [employer], the [employer] voice recognition software. Quite often unanticipated grammatical, syntax, homophones, and other interpretive errors are inadvertently transcribed by the computer software. Please disregard these errors. Please excuse any errors that have escaped final proofreading.

## 2020-08-06 NOTE — DISCHARGE INSTRUCTIONS
Patient Education        Foot Pain: Care Instructions  Your Care Instructions  Foot injuries that cause pain and swelling are fairly common. Almost all sports or home repair projects can cause a misstep that ends up as foot pain. Normal wear and tear, especially as you get older, also can cause foot pain. Most minor foot injuries will heal on their own, and home treatment is usually all you need to do. If you have a severe injury, you may need tests and treatment. Follow-up care is a key part of your treatment and safety. Be sure to make and go to all appointments, and call your doctor if you are having problems. It's also a good idea to know your test results and keep a list of the medicines you take. How can you care for yourself at home? · Take pain medicines exactly as directed. ? If the doctor gave you a prescription medicine for pain, take it as prescribed. ? If you are not taking a prescription pain medicine, ask your doctor if you can take an over-the-counter medicine. · Rest and protect your foot. Take a break from any activity that may cause pain. · Put ice or a cold pack on your foot for 10 to 20 minutes at a time. Put a thin cloth between the ice and your skin. · Prop up the sore foot on a pillow when you ice it or anytime you sit or lie down during the next 3 days. Try to keep it above the level of your heart. This will help reduce swelling. · Your doctor may recommend that you wrap your foot with an elastic bandage. Keep your foot wrapped for as long as your doctor advises. · If your doctor recommends crutches, use them as directed. · Wear roomy footwear. · As soon as pain and swelling end, begin gentle exercises of your foot. Your doctor can tell you which exercises will help. When should you call for help? [id number] anytime you think you may need emergency care. For example, call if:  · Your foot turns pale, white, blue, or cold.   Call your doctor now or seek immediate medical care if:  · You cannot move or stand on your foot. · Your foot looks twisted or out of its normal position. · Your foot is not stable when you step down. · You have signs of infection, such as:  ? Increased pain, swelling, warmth, or redness. ? Red streaks leading from the sore area. ? Pus draining from a place on your foot. ? A fever. · Your foot is numb or tingly. Watch closely for changes in your health, and be sure to contact your doctor if:  · You do not get better as expected. · You have bruises from an injury that last longer than 2 weeks. Where can you learn more? Go to http://julio-kristi.info/  Enter D999 in the search box to learn more about \"Foot Pain: Care Instructions. \"  Current as of: March 2, 2020               Content Version: 12.5  © 3981-0826 [employer]. Care instructions adapted under license by [employer] (which disclaims liability or warranty for this information). If you have questions about a medical condition or this instruction, always ask your healthcare professional. [place] [address] any warranty or liability for your use of this information. Patient Education        Joint Pain: Care Instructions  Your Care Instructions     Many people have small aches and pains from overuse or injury to muscles and joints. Joint injuries often happen during sports or recreation, work tasks, or projects around the home. An overuse injury can happen when you put too much stress on a joint or when you do an activity that stresses the joint over and over, such as using the computer or rowing a boat. You can take action at home to help your muscles and joints get better. You should feel better in 1 to 2 weeks, but it can take 3 months or more to heal completely. Follow-up care is a key part of your treatment and safety. Be sure to make and go to all appointments, and call your doctor if you are having problems.  It's also a good idea to know your test results and keep a list of the medicines you take. How can you care for yourself at home? · Do not put weight on the injured joint for at least a day or two. · For the first day or two after an injury, do not take hot showers or baths, and do not use hot packs. The heat could make swelling worse. · Put ice or a cold pack on the sore joint for 10 to 20 minutes at a time. Try to do this every 1 to 2 hours for the next 3 days (when you are awake) or until the swelling goes down. Put a thin cloth between the ice and your skin. · Wrap the injury in an elastic bandage. Do not wrap it too tightly because this can cause more swelling. · Prop up the sore joint on a pillow when you ice it or anytime you sit or lie down during the next 3 days. Try to keep it above the level of your heart. This will help reduce swelling. · Take an over-the-counter pain medicine, such as acetaminophen (Tylenol), ibuprofen (Advil, Motrin), or naproxen (Aleve). Read and follow all instructions on the label. · After 1 or 2 days of rest, begin moving the joint gently. While the joint is still healing, you can begin to exercise using activities that do not strain or hurt the painful joint. When should you call for help? Call your doctor now or seek immediate medical care if:  · You have signs of infection, such as:  ? Increased pain, swelling, warmth, and redness. ? Red streaks leading from the joint. ? A fever. Watch closely for changes in your health, and be sure to contact your doctor if:  · Your movement or symptoms are not getting better after 1 to 2 weeks of home treatment. Where can you learn more? Go to http://julio-kristi.info/  Enter P205 in the search box to learn more about \"Joint Pain: Care Instructions. \"  Current as of: March 2, 2020               Content Version: 12.5  © 7665-6038 [employer].    Care instructions adapted under license by [employer] (which disclaims liability or warranty for this information). If you have questions about a medical condition or this instruction, always ask your healthcare professional. [address] any warranty or liability for your use of this information.

## 2020-08-07 ENCOUNTER — TELEPHONE (OUTPATIENT)
Dept: INTERNAL MEDICINE CLINIC | Age: 40
End: 2020-08-07

## 2020-08-07 NOTE — TELEPHONE ENCOUNTER
Patient contacted, patient identified with two identifiers (Name & ). Patient aware of results per DR. Simmons and verbalizes understanding.

## 2020-08-07 NOTE — PROGRESS NOTES
Please let her know that her A1C is normal at 5.5. Her TFTs are normal. Her CBC is normal. She should continue with rx as prescribed.     Dr. Josesito Buchanan  [facility] [address], [address] [place] [address] Str.  Phone: [phone]  Fax: [phone]

## 2020-08-07 NOTE — TELEPHONE ENCOUNTER
----- Message from Trenton Leal MD sent at 8/7/2020  9:01 AM EDT -----  Please let her know that her A1C is normal at 5.5. Her TFTs are normal. Her CBC is normal. She should continue with rx as prescribed.     Dr. Stacey Joe  [facility], [address] [place] [address], [address] [place] [address] Str.  Phone: [phone]  Fax: [phone]

## 2020-08-15 ENCOUNTER — HOSPITAL ENCOUNTER (OUTPATIENT)
Dept: MRI IMAGING | Age: 40
Discharge: HOME | End: 2020-08-15
Attending: NURSE PRACTITIONER

## 2020-08-15 DIAGNOSIS — G43.009 ATYPICAL MIGRAINE: ICD-10-CM

## 2020-08-15 DIAGNOSIS — R20.2 NUMBNESS AND TINGLING OF RIGHT FACE: ICD-10-CM

## 2020-08-15 DIAGNOSIS — R29.810 FACIAL WEAKNESS: ICD-10-CM

## 2020-08-15 DIAGNOSIS — R51.9 WORSENING HEADACHES: ICD-10-CM

## 2020-08-15 DIAGNOSIS — R20.0 NUMBNESS AND TINGLING OF RIGHT FACE: ICD-10-CM

## 2020-08-27 ENCOUNTER — TELEPHONE (OUTPATIENT)
Dept: NEUROLOGY | Age: 40
End: 2020-08-27

## 2020-08-27 NOTE — TELEPHONE ENCOUNTER
Emgality 120 mg/ml auto-injector Formulary Exception/ Prior Auth Request form in Media to be completed and sign.

## 2020-08-28 ENCOUNTER — TELEPHONE (OUTPATIENT)
Dept: NEUROLOGY | Age: 40
End: 2020-08-28

## 2020-08-28 NOTE — TELEPHONE ENCOUNTER
Spoke with Chepe Osman with [employer], informed patient's prior auth for [employer]. Office to receive fax.

## 2020-08-28 NOTE — TELEPHONE ENCOUNTER
Alyssa from prior auth department at [facility] called for clinical information  Ref # [id number]

## 2020-09-01 DIAGNOSIS — F40.240 CLAUSTROPHOBIA: Primary | ICD-10-CM

## 2020-09-01 RX ORDER — DIAZEPAM 10 MG/1
TABLET ORAL
Qty: 1 TAB | Refills: 0
Start: 2020-09-01 | End: 2020-11-09

## 2020-09-01 NOTE — TELEPHONE ENCOUNTER
Unable to speak with Pharmacist, Left VM with office information and patient's name and , Rx for Diazepam 10 mg, 1 tablet no refills phoned in for MRI, must have .

## 2020-09-02 ENCOUNTER — PATIENT MESSAGE (OUTPATIENT)
Dept: NEUROLOGY | Age: 40
End: 2020-09-02

## 2020-09-02 ENCOUNTER — DOCUMENTATION ONLY (OUTPATIENT)
Dept: NEUROLOGY | Age: 40
End: 2020-09-02

## 2020-09-02 NOTE — TELEPHONE ENCOUNTER
----- Message from Maribel Tejada NP sent at 9/2/2020 12:51 PM EDT -----  PA completed and faxed to [facility]. Please complete top portion and fax to appropriate number.

## 2020-09-03 NOTE — TELEPHONE ENCOUNTER
----- Message from Charlie Drew NP sent at 9/1/2020 11:20 AM EDT -----  Regarding: FW: Non-Urgent Medical Question  Contact: [phone]  Please call in Valium for MRI. Check connect care for patient Rx. I also need you to ask her the dates she took Topamax, Inderal, and used Botox. I am trying to fill out her Emgality PA and do not have this information. Please get back to me so I can complete the paperwork. Thank you!    ----- Message -----  From: Chanda Zaman LPN  Sent: 2/01/1812   2:23 PM EDT  To: Charlie Drew NP  Subject: FW: Non-Urgent Medical Question                  NP to advise sedative for MRI to be rescheduled. ----- Message -----  From: Glen Burns  Sent: 8/15/2020  10:51 AM EDT  To: [facility] Nurse [place]  Subject: Non-Urgent Medical Question                      I tried to have an MRI today but had a panic attack. Is there a way to get a sedated MRI or something to calm me so I can reschedule this MRI?

## 2020-09-04 NOTE — TELEPHONE ENCOUNTER
Spoke with patient verified name and , informed patient Rx requested in Pharmacy, must have  for MRI.

## 2020-09-14 ENCOUNTER — TELEPHONE (OUTPATIENT)
Dept: INTERNAL MEDICINE CLINIC | Age: 40
End: 2020-09-14

## 2020-09-14 NOTE — TELEPHONE ENCOUNTER
Pt called stated she has appt with you 09/30/2020 and you wanted her to have MRI and 3 specialists visits completed before that appt. She said she is seeing Cardiology 09/23/2020, has already seen Neurology. Stated is in the process of re-scheduling MRI, which she believes will be r/s soon. She had issues with the first try     Said the GYN has changed her appt twice, so she won't have seen them before 09/30/2020    Please advise if okay to keep 09/30/2020 appt with you.  She was going to r/s but you have nothing available until November

## 2020-09-23 ENCOUNTER — OFFICE VISIT (OUTPATIENT)
Dept: CARDIOLOGY CLINIC | Age: 40
End: 2020-09-23
Payer: COMMERCIAL

## 2020-09-23 VITALS
WEIGHT: 238 LBS | BODY MASS INDEX: 36.07 KG/M2 | DIASTOLIC BLOOD PRESSURE: 90 MMHG | HEART RATE: 84 BPM | SYSTOLIC BLOOD PRESSURE: 170 MMHG | HEIGHT: 68 IN | OXYGEN SATURATION: 98 %

## 2020-09-23 DIAGNOSIS — R01.1 MURMUR, HEART: ICD-10-CM

## 2020-09-23 DIAGNOSIS — R03.0 BORDERLINE HYPERTENSION: ICD-10-CM

## 2020-09-23 DIAGNOSIS — Z82.49 FAMILY HISTORY OF PREMATURE CAD: ICD-10-CM

## 2020-09-23 DIAGNOSIS — R07.9 CHEST PAIN, UNSPECIFIED TYPE: Primary | ICD-10-CM

## 2020-09-23 DIAGNOSIS — G54.0: ICD-10-CM

## 2020-09-23 PROCEDURE — 93000 ELECTROCARDIOGRAM COMPLETE: CPT | Performed by: INTERNAL MEDICINE

## 2020-09-23 PROCEDURE — 99204 OFFICE O/P NEW MOD 45 MIN: CPT | Performed by: INTERNAL MEDICINE

## 2020-09-23 RX ORDER — MELOXICAM 15 MG/1
15 TABLET ORAL DAILY
COMMUNITY
End: 2021-06-25

## 2020-09-23 NOTE — PROGRESS NOTES
[facility]    Chief Complaint   Patient presents with   Zada Sprain New Patient     referred by PCP    HTN    HPI    [facility] is a 38 y.o. heart disease here for evaluation of hypertension. As you know this patient was referred to my office back in 2018 and saw my partner for evaluation of murmur. This led to a stress echocardiogram which I independently obtained and reviewed the report with her today and was completely normal.  She had no significant valvular pathology. Does have a known history of thoracic outlet syndrome but most recently has been dealing with issues status post an orthopedic injury to her right ankle as well as recurrent migraines that have sent her to the ER multiple times. She's on Mobic for her ankle pain/ swelling and says she meets back with Ortho  to decide if they will pursue surgery. She has a lot of pain in her ankle and is on crutches. Additionally, she was started on Emgality for migraine prophylaxis. In setting of a migraine her SBP is very high >180s but has been documented >160s when not an acute headache. If her injection doesn't work she takes Fioricet prn but not more than 1-2x/ month. CV RFs: +FH her father  of complications of CA but says he had CABG in his 45s    Past Medical History:   Diagnosis Date    Benign tumor of cervix     Calculus of kidney     Elevated BP without diagnosis of hypertension     Fracture of finger of right hand 2018    5th digit    H/O thoracic outlet syndrome     Headache     Migraine headache     Thyroid disease     hypothyroidism    Trauma        Past Surgical History:   Procedure Laterality Date    HX BREAST REDUCTION      bilateral    HX  SECTION      x2    HX CHOLECYSTECTOMY      HX DILATION AND CURETTAGE      x3    HX PARTIAL HYSTERECTOMY         Current Outpatient Medications   Medication Sig Dispense Refill    meloxicam (MOBIC) 15 mg tablet Take 15 mg by mouth daily.       diazePAM (VALIUM) 10 mg tablet Take 1 tablet PO 45 minutes prior to MR. Must have . 1 Tab 0    acetaminophen (TYLENOL) 325 mg tablet Take 2 Tabs by mouth every six (6) hours as needed for Pain. 20 Tab 0    thyroid, Pork, ([place] Thyroid) 60 mg tablet Take  by mouth daily.  butalbital-acetaminophen-caffeine (FIORICET, ESGIC) -40 mg per tablet Take 1 Tab by mouth every six (6) hours as needed for Headache.  15 Tab 1    Emgality Pen 120 mg/mL injection First month 240 mg SQ inj followed by 120 mg SQ inj q month thereafter 2 Syringe 5       Allergies   Allergen Reactions    Codeine Hives, Itching, Swelling and Anaphylaxis    Shellfish Derived Shortness of Breath       Social History     Socioeconomic History    Marital status:      Spouse name: Not on file    Number of children: Not on file    Years of education: Not on file    Highest education level: Not on file   Occupational History    Not on file   Social Needs    Financial resource strain: Not on file    Food insecurity     Worry: Not on file     Inability: Not on file    Transportation needs     Medical: Not on file     Non-medical: Not on file   Tobacco Use    Smoking status: Never Smoker    Smokeless tobacco: Never Used   Substance and Sexual Activity    Alcohol use: Yes     Comment: rare    Drug use: Never    Sexual activity: Yes     Partners: Male     Birth control/protection: None   Lifestyle    Physical activity     Days per week: Not on file     Minutes per session: Not on file    Stress: Not on file   Relationships    Social connections     Talks on phone: Not on file     Gets together: Not on file     Attends Hoahaoism service: Not on file     Active member of club or organization: Not on file     Attends meetings of clubs or organizations: Not on file     Relationship status: Not on file    Intimate partner violence     Fear of current or ex partner: Not on file     Emotionally abused: Not on file Physically abused: Not on file     Forced sexual activity: Not on file   Other Topics Concern    Not on file   Social History Narrative    Not on file      FH: see HPI    Review of Systems    14 pt Review of Systems is negative unless otherwise mentioned in the HPI. Wt Readings from Last 3 Encounters:   09/23/20 108 kg (238 lb)   08/05/20 102.1 kg (225 lb)   08/15/20 102.1 kg (225 lb)     Temp Readings from Last 3 Encounters:   08/05/20 98.5 °F (36.9 °C)   03/12/20 98.8 °F (37.1 °C)   08/21/19 98.4 °F (36.9 °C) (Oral)     BP Readings from Last 3 Encounters:   09/23/20 (!) 170/90   08/05/20 (!) 182/118   03/12/20 127/86     Pulse Readings from Last 3 Encounters:   09/23/20 84   08/05/20 89   03/12/20 70       12/27/18   ECHO ADULT COMPLETE 12/27/2018 12/27/2018    Narrative · Estimated left ventricular ejection fraction is 56 - 60%. Visually   measured ejection fraction. Normal left ventricular wall motion, no   regional wall motion abnormality noted. · There is no evidence of pulmonary hypertension. Signed by: Tashia Allred DO       Physical Exam:    Visit Vitals  BP (!) 170/90 (BP 1 Location: Left arm, BP Patient Position: Sitting)   Pulse 84   Ht 5' 8\" (1.727 m)   Wt 108 kg (238 lb)   SpO2 98%   BMI 36.19 kg/m²      Physical Exam  HENT:      Head: Normocephalic and atraumatic. Eyes:      Pupils: Pupils are equal, round, and reactive to light. Cardiovascular:      Rate and Rhythm: Normal rate and regular rhythm. Heart sounds: Normal heart sounds. No murmur. No friction rub. No gallop. Pulmonary:      Effort: Pulmonary effort is normal. No respiratory distress. Breath sounds: Normal breath sounds. No wheezing or rales. Chest:      Chest wall: No tenderness. Abdominal:      General: Bowel sounds are normal.      Palpations: Abdomen is soft. Musculoskeletal:         General: No tenderness. Skin:     General: Skin is warm and dry.    Neurological:      Mental Status: She is alert and oriented to person, place, and time. EKG today shows: NSR, normal axis and intervals, no ST segment abnormalities    Lab Results   Component Value Date/Time    Cholesterol, total 168 08/21/2019 11:52 AM    HDL Cholesterol 40 08/21/2019 11:52 AM    LDL, calculated 109.8 (H) 08/21/2019 11:52 AM    VLDL, calculated 18.2 08/21/2019 11:52 AM    Triglyceride 91 08/21/2019 11:52 AM    CHOL/HDL Ratio 4.2 08/21/2019 11:52 AM     Impression and Plan:  Katelynn Roque is a 38 y.o. with:    1.) HTN, likely multifactorial but can absolutely be component of secondary HTN from combo of pain (marium, migraines), as well as antiinflammatories  2.) Normal stress echo 2018  3.) Migraines  4.) Recent ankle injury on Mobic  5.) +FH premature ASCVD  7.) H/o thoracic outlet, known    1.) Offered Norvasc or Propanolol, both would be good options for this pt- she really preferred to avoid Norvasc when I told her 10% of patients will experience LE edema. I see no contraindications for Propanolol and it may be helpful since also used for migraine prophylaxis  2.) She preferred to wait, so will coordinate repeat BP check with NP in ~8 weeks after Ortho appt/ plan in place and if SBP still >140s would suggest starting Propanolol 80 mg BID  3.) RTC with me 6 months and want to eventually discussion prevention with CAC due to her +FH    >60 mins spent,    Thank you for allowing me to participate in the care of your patient, please do not hesitate to call with questions or concerns.     [address],    Marguerite Velasquez,

## 2020-09-23 NOTE — PROGRESS NOTES
Lidia Mann presents today for   Chief Complaint   Patient presents with    New Patient     referred by PCP        Lidia Mann preferred language for health care discussion is english/other. Is someone accompanying this pt? no    Is the patient using any DME equipment during [address]? no    Depression Screening:  3 most recent PHQ Screens 9/23/2020   Little interest or pleasure in doing things Not at all   Feeling down, depressed, irritable, or hopeless Not at all   Total Score PHQ 2 0       Learning Assessment:  Learning Assessment 8/21/2019   PRIMARY LEARNER Patient   HIGHEST LEVEL OF EDUCATION - PRIMARY LEARNER  2 YEARS Yarely PRIMARY LEARNER NONE   CO-LEARNER CAREGIVER No   PRIMARY LANGUAGE ENGLISH   LEARNER PREFERENCE PRIMARY DEMONSTRATION   ANSWERED BY patient   RELATIONSHIP SELF       Abuse Screening:  Abuse Screening Questionnaire 9/23/2020   Do you ever feel afraid of your partner? N   Are you in a relationship with someone who physically or mentally threatens you? N   Is it safe for you to go home? Y       Pt currently taking Anticoagulant therapy? no    Coordination of Care:  1. Have you been to the ER, urgent care clinic since your last visit? Hospitalized since your last visit? no    2. Have you seen or consulted any other health care providers outside of the [address] [place] [address] since your last visit? Include any pap smears or colon screening.  no

## 2020-09-28 ENCOUNTER — TELEPHONE (OUTPATIENT)
Dept: NEUROLOGY | Age: 40
End: 2020-09-28

## 2020-09-28 NOTE — TELEPHONE ENCOUNTER
Mayito @ [employer] Dept requests peer to peer regarding pt's brain MRI scheduled for Wednesday 9/30. 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 Please call [facility] for peer to peer at: [phone].

## 2020-09-30 ENCOUNTER — TELEPHONE (OUTPATIENT)
Dept: NEUROLOGY | Age: 40
End: 2020-09-30

## 2020-09-30 NOTE — TELEPHONE ENCOUNTER
Spoke with Luke Da Silva with patient's [facility] [place] [address] [facility] September 29,2020.

## 2020-11-09 ENCOUNTER — TELEPHONE (OUTPATIENT)
Dept: INTERNAL MEDICINE CLINIC | Age: 40
End: 2020-11-09

## 2020-11-09 ENCOUNTER — VIRTUAL VISIT (OUTPATIENT)
Dept: INTERNAL MEDICINE CLINIC | Age: 40
End: 2020-11-09
Payer: COMMERCIAL

## 2020-11-09 DIAGNOSIS — G89.29 CHRONIC PAIN OF RIGHT ANKLE: ICD-10-CM

## 2020-11-09 DIAGNOSIS — E03.9 ACQUIRED HYPOTHYROIDISM: Primary | ICD-10-CM

## 2020-11-09 DIAGNOSIS — Z82.49 FAMILY HISTORY OF PREMATURE CAD: ICD-10-CM

## 2020-11-09 DIAGNOSIS — R10.2 PELVIC PAIN: ICD-10-CM

## 2020-11-09 DIAGNOSIS — M25.571 CHRONIC PAIN OF RIGHT ANKLE: ICD-10-CM

## 2020-11-09 DIAGNOSIS — G43.809 OTHER MIGRAINE WITHOUT STATUS MIGRAINOSUS, NOT INTRACTABLE: ICD-10-CM

## 2020-11-09 PROBLEM — R01.1 MURMUR, HEART: Status: RESOLVED | Noted: 2018-12-14 | Resolved: 2020-11-09

## 2020-11-09 PROBLEM — R03.0 BORDERLINE HYPERTENSION: Status: RESOLVED | Noted: 2018-12-14 | Resolved: 2020-11-09

## 2020-11-09 PROCEDURE — 99214 OFFICE O/P EST MOD 30 MIN: CPT | Performed by: INTERNAL MEDICINE

## 2020-11-09 NOTE — TELEPHONE ENCOUNTER
----- Message from Arcelia Brown MD sent at 11/9/2020  9:54 AM EST -----  Regarding: F/u apts in 9 months  Please schedule for labs in 9 months. Please schedule an in office 30 min apt with me a wk later.     Thanks,  Dr. Burt Spring  [facility] [address], [address] [place] [address] Str.  Phone: [phone]  Fax: [phone]

## 2020-11-09 NOTE — PROGRESS NOTES
Mike Macdonald is a 38 y.o. female who was seen by synchronous (real-time) audio-video technology on 11/9/2020. Assessment & Plan:   1. Hypothyroidism: Stable. - Checking labs in 9 months. - C/w rx as prescribed. - F/u with me in the office for a check up in 9 months    2. Family H/o CAD: Cardiac wise, she is stable. - F/u with Kevin Costa for routine studies    3. Migraine HAs: Improving.  - C/w rx as prescribed. - F/u with Neurology    4. Right Ankle Pain: Improving per pt hx. - C/w PT. F/u with Orthopedics. 5. Pelvic Pain: Unchanged. - F/u with GYN for an exam     6. Health Maintenance:  - She declines her flu vaccine. - Checking a lipid panel next yr since her last LDL was 109 in August of 2019. Lab review: labs are reviewed in the EHR and ordered as mentioned above     I have discussed the diagnosis with the patient and the intended plan as seen in the above orders. I have discussed medication side effects and warnings with the patient as well. I have reviewed the plan of care with the patient, accepted their input and they are in agreement with the treatment goals. All questions were answered. The patient understands the plan of care. Pt instructed if symptoms worsen to call the office or report to the ED for continued care. Greater than 50% of the visit time was spent in counseling and/or coordination of care. Voice recognition was used to generate this report, which may have resulted in some phonetic based errors in grammar and contents. Even though attempts were made to correct all the mistakes, some may have been missed, and remained in the body of the document. Subjective:   Mike Macdonald was seen for   Chief Complaint   Patient presents with    Follow-up     The pt is a 40yo female with h/o hypothyroidism, migraine HAs (refractory to botox rx and multiple rx in the past), thoracic outlet syndrome (left) s/p surgery 12/17, and kidney stones.     1. Family H/o CAD:  She saw Tess Phipps in September who recommended propranolol for migraine HA ppx and for her h/o palpitations/CP sx. The pt declined. She had a negative stress test in 2018. 2. Hypothyroidism: Her last TFTs in August were WNL. Taking: Sycamore pork thyroid 60mg daily. 3. Migraine HAs: She was scheduled for an head MRI by the Neurology. She is using emgality. HAs have improved with this rx. 4. Pelvic Pain: She has some pelvic pain and spotting. Her GYN doctor rescheduled her apt for later this year. +H/o partial hysterectomy. 5. Right Foot/Ankle Pain: S/p an incident in which she rolled her ankle (right) in August. +Seen in the ED. Xrays ruled out fx. She is scheduled to see her surgeon later this wk. She still has tenderness along her foot. She is doing PT. Pain is improving since August. She wears a therapeutic boot. \"The swelling is down. \" She ices the affected areas for some sx relief. 6. Health Maintenance:  - She declines the flu vaccine. Prior to Admission medications    Medication Sig Start Date End Date Taking? Authorizing Provider   meloxicam (MOBIC) 15 mg tablet Take 15 mg by mouth daily. Provider, Historical   diazePAM (VALIUM) 10 mg tablet Take 1 tablet PO 45 minutes prior to MR. Must have . 9/1/20   Francois Mahoney NP   acetaminophen (TYLENOL) 325 mg tablet Take 2 Tabs by mouth every six (6) hours as needed for Pain. 8/5/20   Anurag Romeo PA-C   thyroid, Pork, ([place] Thyroid) 60 mg tablet Take  by mouth daily. Provider, Historical   butalbital-acetaminophen-caffeine (FIORICET, ESGIC) -40 mg per tablet Take 1 Tab by mouth every six (6) hours as needed for Headache.  8/3/20   Francois Mahoney NP   Emgality Pen 120 mg/mL injection First month 240 mg SQ inj followed by 120 mg SQ inj q month thereafter 8/3/20   Liz Bajwa NP     Allergies   Allergen Reactions    Codeine Hives, Itching, Swelling and Anaphylaxis    Shellfish Derived Shortness of Breath     Past Medical History:   Diagnosis Date    Benign tumor of cervix     Calculus of kidney     Elevated BP without diagnosis of hypertension     Fracture of finger of right hand 2018    5th digit    H/O thoracic outlet syndrome     Headache     Migraine headache     Thyroid disease     hypothyroidism    Trauma      Past Surgical History:   Procedure Laterality Date    HX BREAST REDUCTION      bilateral    HX  SECTION      x2    HX CHOLECYSTECTOMY      HX DILATION AND CURETTAGE      x3    HX PARTIAL HYSTERECTOMY       Family History   Problem Relation Age of Onset   Keshav.Elder Migraines Mother     Hypertension Mother     Cancer Father     Heart Disease Father     Heart Attack Father     No Known Problems Sister     Diabetes Maternal Grandmother     Dementia Maternal Grandmother     No Known Problems Maternal Grandfather     No Known Problems Paternal Grandmother     No Known Problems Paternal Grandfather     Migraines Son     Migraines Daughter      Social History     Socioeconomic History    Marital status:      Spouse name: Not on file    Number of children: Not on file    Years of education: Not on file    Highest education level: Not on file   Tobacco Use    Smoking status: Never Smoker    Smokeless tobacco: Never Used   Substance and Sexual Activity    Alcohol use: Yes     Comment: rare    Drug use: Never    Sexual activity: Yes     Partners: Male     Birth control/protection: None       ROS:  Gen: No fever/chills  HEENT: No sore throat, eye pain, ear pain, or congestion. HAs -improving.   CV: No CP  Resp: No cough/SOB  GI: See HPI  : No hematuria/dysuria  Derm: No rash  Neuro: No new paresthesias/weakness  Musc: See HPI  Psych: No depression sx      Objective:     General: alert, cooperative, no distress   Mental  status: mental status: alert, oriented to person, place, and time, normal mood, behavior, speech, dress, motor activity, and thought processes   Resp: resp: normal effort and no respiratory distress   Neuro: neuro: no gross deficits   Skin: skin: no discoloration or lesions of concern on visible areas     LABS:  Lab Results   Component Value Date/Time    Sodium 139 08/21/2019 11:52 AM    Potassium 4.6 08/21/2019 11:52 AM    Chloride 103 08/21/2019 11:52 AM    CO2 30 08/21/2019 11:52 AM    Anion gap 6 08/21/2019 11:52 AM    Glucose 87 08/21/2019 11:52 AM    BUN 13 08/21/2019 11:52 AM    Creatinine 0.78 08/21/2019 11:52 AM    BUN/Creatinine ratio 17 08/21/2019 11:52 AM    GFR est AA >60 08/21/2019 11:52 AM    GFR est non-AA >60 08/21/2019 11:52 AM    Calcium 9.2 08/21/2019 11:52 AM       Lab Results   Component Value Date/Time    Cholesterol, total 168 08/21/2019 11:52 AM    HDL Cholesterol 40 08/21/2019 11:52 AM    LDL, calculated 109.8 (H) 08/21/2019 11:52 AM    VLDL, calculated 18.2 08/21/2019 11:52 AM    Triglyceride 91 08/21/2019 11:52 AM    CHOL/HDL Ratio 4.2 08/21/2019 11:52 AM       Lab Results   Component Value Date/Time    WBC 7.8 08/06/2020 09:36 AM    HGB 13.5 08/06/2020 09:36 AM    HCT 39.9 08/06/2020 09:36 AM    PLATELET 624 56/15/4306 09:36 AM    MCV 93.4 08/06/2020 09:36 AM       Lab Results   Component Value Date/Time    Hemoglobin A1c 5.5 08/06/2020 09:36 AM    Hemoglobin A1c, External 5.3 11/09/2018       Lab Results   Component Value Date/Time    TSH 1.18 08/06/2020 09:36 AM           Due to this being a TeleHealth  evaluation, many elements of the physical examination are unable to be assessed. The pt was seen by synchronous (real-time) audio-video technology, and/or her healthcare decision maker, is aware that this patient-initiated, Telehealth encounter is a billable service, with coverage as determined by her insurance carrier. She is aware that she may receive a bill and has provided verbal consent to proceed: Yes. The pt is being evaluated by a video visit encounter for concerns as above.  A caregiver was present when appropriate. Due to this being a TeleHealth encounter (During LXAVT-75 public health emergency), evaluation of the following organ systems was limited: Vitals/Constitutional/EENT/Resp/CV/GI//MS/Neuro/Skin/Heme-Lymph-Imm. Pursuant to the emergency declaration under the [address] [place] [address] waiver authority and the [employer] Act, this Virtual  Visit was conducted, with patient's (and/or legal guardian's) consent, to reduce the patient's risk of exposure to COVID-19 and provide necessary medical care. Services were provided through a video synchronous discussion virtually to substitute for in-person clinic visit. Patient and provider were located at their individual homes. We discussed the expected course, resolution and complications of the diagnosis(es) in detail. Medication risks, benefits, costs, interactions, and alternatives were discussed as indicated. I advised her to contact the office if her condition worsens, changes or fails to improve as anticipated. She expressed understanding with the diagnosis(es) and plan.      Daniel Maxwell MD

## 2021-01-27 ENCOUNTER — VIRTUAL VISIT (OUTPATIENT)
Dept: NEUROLOGY | Age: 41
End: 2021-01-27
Payer: COMMERCIAL

## 2021-01-27 DIAGNOSIS — G43.809 OTHER MIGRAINE WITHOUT STATUS MIGRAINOSUS, NOT INTRACTABLE: Primary | ICD-10-CM

## 2021-01-27 DIAGNOSIS — G43.109 COMPLICATED MIGRAINE: ICD-10-CM

## 2021-01-27 DIAGNOSIS — G43.009 ATYPICAL MIGRAINE: ICD-10-CM

## 2021-01-27 DIAGNOSIS — G43.019 INTRACTABLE MIGRAINE WITHOUT AURA AND WITHOUT STATUS MIGRAINOSUS: ICD-10-CM

## 2021-01-27 DIAGNOSIS — R29.810 FACIAL WEAKNESS: ICD-10-CM

## 2021-01-27 PROCEDURE — 99214 OFFICE O/P EST MOD 30 MIN: CPT | Performed by: NURSE PRACTITIONER

## 2021-01-27 RX ORDER — TENS UNITS AND TENS ELECTRODES
COMBINATION PACKAGE (EA) MISCELLANEOUS
Qty: 1 EACH | Refills: 3 | Status: SHIPPED | OUTPATIENT
Start: 2021-01-27 | End: 2022-08-02
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
## 2021-01-27 NOTE — PROGRESS NOTES
Barron Britt is a 38 y.o. female who will be using a cell phone for today's VV    1. Have you been to the ER, urgent care clinic since your last visit? Hospitalized since your last visit? No    2. Have you seen or consulted any other health care providers outside of the [address] [place] [address] since your last visit? Include any pap smears or colon screening.  No     This will be the cell phone number [phone]

## 2021-01-28 DIAGNOSIS — G43.109 COMPLICATED MIGRAINE: ICD-10-CM

## 2021-01-29 ENCOUNTER — TELEPHONE (OUTPATIENT)
Dept: NEUROLOGY | Age: 41
End: 2021-01-29

## 2021-02-03 ENCOUNTER — TELEPHONE (OUTPATIENT)
Dept: NEUROLOGY | Age: 41
End: 2021-02-03

## 2021-02-03 NOTE — TELEPHONE ENCOUNTER
AMB supply order cannot be put in through [employer]. Has to be order through . Most insurances will not cover.

## 2021-02-15 NOTE — TELEPHONE ENCOUNTER
Called patient and let her know she would need to order it from [facility]. Patient stated she saw one on [employer] and would like to try this first before she buys the expensive one.

## 2021-02-26 ENCOUNTER — TELEPHONE (OUTPATIENT)
Dept: NEUROLOGY | Age: 41
End: 2021-02-26

## 2021-02-26 NOTE — TELEPHONE ENCOUNTER
I call to set up Peer to peer. The company said you do not need to schedule an appt you would need to just call when your available. Let me know when you are ready to call. I can give you her insurance information.

## 2021-02-26 NOTE — TELEPHONE ENCOUNTER
Patient is scheduled for MRI on Monday; insurance is asking for a peer to peer [phone] [employer]; primary insurance approved MRI secondary is asking for the peer to peer

## 2021-05-09 DIAGNOSIS — Z82.49 FAMILY HISTORY OF PREMATURE CAD: ICD-10-CM

## 2021-05-09 DIAGNOSIS — G43.809 OTHER MIGRAINE WITHOUT STATUS MIGRAINOSUS, NOT INTRACTABLE: ICD-10-CM

## 2021-05-09 DIAGNOSIS — E03.9 ACQUIRED HYPOTHYROIDISM: ICD-10-CM

## 2021-05-14 ENCOUNTER — OFFICE VISIT (OUTPATIENT)
Dept: CARDIOLOGY CLINIC | Age: 41
End: 2021-05-14
Payer: COMMERCIAL

## 2021-05-14 VITALS
DIASTOLIC BLOOD PRESSURE: 90 MMHG | BODY MASS INDEX: 37.28 KG/M2 | SYSTOLIC BLOOD PRESSURE: 150 MMHG | WEIGHT: 246 LBS | HEART RATE: 81 BPM | HEIGHT: 68 IN | OXYGEN SATURATION: 97 %

## 2021-05-14 DIAGNOSIS — Z01.810 PRE-OPERATIVE CARDIOVASCULAR EXAMINATION: Primary | ICD-10-CM

## 2021-05-14 PROCEDURE — 99215 OFFICE O/P EST HI 40 MIN: CPT | Performed by: INTERNAL MEDICINE

## 2021-05-14 PROCEDURE — 93000 ELECTROCARDIOGRAM COMPLETE: CPT | Performed by: INTERNAL MEDICINE

## 2021-05-14 RX ORDER — AMLODIPINE BESYLATE 5 MG/1
5 TABLET ORAL DAILY
Qty: 30 TAB | Refills: 5 | Status: SHIPPED | OUTPATIENT
Start: 2021-05-14 | End: 2022-01-25

## 2021-05-14 NOTE — PROGRESS NOTES
5 [facility]    Chief Complaint   Patient presents with    Follow-up     8 month f/u    Surgical Clearance     Left salpingo oophorectomy   HTN    HPI    5 [facility] is a 42 y.o. heart disease here for evaluation of hypertension. As you know this patient was referred to my office back in 2018 and saw my partner for evaluation of murmur. This led to a stress echocardiogram which I independently obtained and reviewed the report with her today and was completely normal.  She had no significant valvular pathology. Does have a known history of thoracic outlet syndrome but most recently has been dealing with issues status post an orthopedic injury to her right ankle as well as recurrent migraines that have sent her to the ER multiple times. She's on Mobic for her ankle pain/ swelling and says she meets back with Ortho  to decide if they will pursue surgery. She has a lot of pain in her ankle and is on crutches. Additionally, she was started on Emgality for migraine prophylaxis. In setting of a migraine her SBP is very high >180s but has been documented >160s when not an acute headache. If her injection doesn't work she takes Fioricet prn but not more than 1-2x/ month.     CV RFs: +FH her father  of complications of CA but says he had CABG in his 45s    Past Medical History:   Diagnosis Date    Benign tumor of cervix     Calculus of kidney     Elevated BP without diagnosis of hypertension     Fracture of finger of right hand 2018    5th digit    H/O thoracic outlet syndrome     Headache     Migraine headache     Thyroid disease     hypothyroidism    Trauma        Past Surgical History:   Procedure Laterality Date    HX BREAST REDUCTION      bilateral    HX  SECTION      x2    HX CHOLECYSTECTOMY      HX DILATION AND CURETTAGE      x3    HX PARTIAL HYSTERECTOMY         Current Outpatient Medications   Medication Sig Dispense Refill    rimegepant ([facility]a [facility]) 75 mg disintegrating tablet Take 1 Tab by mouth as needed for Migraine. Max 1 dose in 24 hours. 15 Tab 5    TENS unit and electrodes (Cefaly) cmpk Cefaly dual. 1 Each 3    meloxicam (MOBIC) 15 mg tablet Take 15 mg by mouth daily.  acetaminophen (TYLENOL) 325 mg tablet Take 2 Tabs by mouth every six (6) hours as needed for Pain. 20 Tab 0    thyroid, Pork, ([place] Thyroid) 60 mg tablet Take  by mouth daily.  butalbital-acetaminophen-caffeine (FIORICET, ESGIC) -40 mg per tablet Take 1 Tab by mouth every six (6) hours as needed for Headache.  15 Tab 1    Emgality Pen 120 mg/mL injection First month 240 mg SQ inj followed by 120 mg SQ inj q month thereafter 2 Syringe 5       Allergies   Allergen Reactions    Codeine Hives, Itching, Swelling and Anaphylaxis    Shellfish Derived Shortness of Breath       Social History     Socioeconomic History    Marital status:      Spouse name: Not on file    Number of children: Not on file    Years of education: Not on file    Highest education level: Not on file   Occupational History    Not on file   Social Needs    Financial resource strain: Not on file    Food insecurity     Worry: Not on file     Inability: Not on file    Transportation needs     Medical: Not on file     Non-medical: Not on file   Tobacco Use    Smoking status: Never Smoker    Smokeless tobacco: Never Used   Substance and Sexual Activity    Alcohol use: Yes     Comment: rare    Drug use: Never    Sexual activity: Yes     Partners: Male     Birth control/protection: None   Lifestyle    Physical activity     Days per week: Not on file     Minutes per session: Not on file    Stress: Not on file   Relationships    Social connections     Talks on phone: Not on file     Gets together: Not on file     Attends [employer] service: Not on file     Active member of club or organization: Not on file     Attends meetings of clubs or organizations: Not on file     Relationship status: Not on file    Intimate partner violence     Fear of current or ex partner: Not on file     Emotionally abused: Not on file     Physically abused: Not on file     Forced sexual activity: Not on file   Other Topics Concern    Not on file   Social History Narrative    Not on file      FH: see HPI    Review of Systems    14 pt Review of Systems is negative unless otherwise mentioned in the HPI. Wt Readings from Last 3 Encounters:   05/14/21 111.6 kg (246 lb)   09/23/20 108 kg (238 lb)   08/05/20 102.1 kg (225 lb)     Temp Readings from Last 3 Encounters:   08/05/20 98.5 °F (36.9 °C)   03/12/20 98.8 °F (37.1 °C)   08/21/19 98.4 °F (36.9 °C) (Oral)     BP Readings from Last 3 Encounters:   05/14/21 (!) 150/90   09/23/20 (!) 170/90   08/05/20 (!) 182/118     Pulse Readings from Last 3 Encounters:   05/14/21 81   09/23/20 84   08/05/20 89       12/27/18   ECHO ADULT COMPLETE 12/27/2018 12/27/2018    Narrative · Estimated left ventricular ejection fraction is 56 - 60%. Visually   measured ejection fraction. Normal left ventricular wall motion, no   regional wall motion abnormality noted. · There is no evidence of pulmonary hypertension. Signed by: Margarita Hopson DO       Physical Exam:    Visit Vitals  BP (!) 150/90 (BP 1 Location: Left upper arm, BP Patient Position: Sitting, BP Cuff Size: Adult)   Pulse 81   Ht 5' 8\" (1.727 m)   Wt 111.6 kg (246 lb)   SpO2 97%   BMI 37.40 kg/m²      Physical Exam  HENT:      Head: Normocephalic and atraumatic. Eyes:      Pupils: Pupils are equal, round, and reactive to light. Cardiovascular:      Rate and Rhythm: Normal rate and regular rhythm. Heart sounds: Normal heart sounds. No murmur. No friction rub. No gallop. Pulmonary:      Effort: Pulmonary effort is normal. No respiratory distress. Breath sounds: Normal breath sounds. No wheezing or rales. Chest:      Chest wall: No tenderness.    Abdominal:      General: Bowel sounds are normal.      Palpations: Abdomen is soft. Musculoskeletal:         General: No tenderness. Skin:     General: Skin is warm and dry. Neurological:      Mental Status: She is alert and oriented to person, place, and time. EKG today shows: NSR, normal axis and intervals, no ST segment abnormalities    Lab Results   Component Value Date/Time    Cholesterol, total 168 08/21/2019 11:52 AM    HDL Cholesterol 40 08/21/2019 11:52 AM    LDL, calculated 109.8 (H) 08/21/2019 11:52 AM    VLDL, calculated 18.2 08/21/2019 11:52 AM    Triglyceride 91 08/21/2019 11:52 AM    CHOL/HDL Ratio 4.2 08/21/2019 11:52 AM     Impression and Plan:  Bernarda Norman is a 42 y.o. with:    1.) HTN, avg SBP goal <140  2.) Normal stress echo 2018  3.) Migraines, greatly improved after eliminating soy  4.) +FH premature ASCVD  5.) H/o thoracic outlet, known  6.) Preop    1.) Norvasc 5 mg daily  2.) Low risk can proceed to OR  3.) RTC 6 months    >50 mins spent,    Thank you for allowing me to participate in the care of your patient, please do not hesitate to call with questions or concerns. Follow-up and Dispositions    · Return in about 6 months (around 11/14/2021).      [address],    Deonte Tena, DO

## 2021-05-14 NOTE — LETTER
5/14/2021 8:49 AM 
 
Ms. Deangelo Freitas [address] [place] [id number] Deangelo Freitas was seen in our office on 5/14/2021 for cardiac evaluation. From a cardiac standpoint she is Low risk for salpingo oophorectomy, may proceed without further testing. Please feel free to contact our office if you have any questions regarding this patient. Sincerely, India Khan, DO

## 2021-05-14 NOTE — PROGRESS NOTES
Katelynn Roque presents today for   Chief Complaint   Patient presents with    Follow-up     8 month f/u    Surgical Clearance     Left salpingo oophorectomy       Cathy Rubio preferred language for health care discussion is english/other. Is someone accompanying this pt? no    Is the patient using any DME equipment during [address]? no    Depression Screening:  3 most recent PHQ Screens 5/14/2021   Little interest or pleasure in doing things Not at all   Feeling down, depressed, irritable, or hopeless Not at all   Total Score PHQ 2 0       Learning Assessment:  Learning Assessment 8/21/2019   PRIMARY LEARNER Patient   HIGHEST LEVEL OF EDUCATION - PRIMARY LEARNER  2 YEARS [facility] PRIMARY LEARNER NONE   CO-LEARNER CAREGIVER No   PRIMARY LANGUAGE ENGLISH   LEARNER PREFERENCE PRIMARY DEMONSTRATION   ANSWERED BY patient   RELATIONSHIP SELF       Abuse Screening:  Abuse Screening Questionnaire 5/14/2021   Do you ever feel afraid of your partner? N   Are you in a relationship with someone who physically or mentally threatens you? N   Is it safe for you to go home? Y       Fall Risk  No flowsheet data found. Pt currently taking Anticoagulant therapy? no    Coordination of Care:  1. Have you been to the ER, urgent care clinic since your last visit? Hospitalized since your last visit? no    2. Have you seen or consulted any other health care providers outside of the [address] [place] [address] since your last visit? Include any pap smears or colon screening.  no

## 2021-07-20 ENCOUNTER — TRANSCRIBE ORDER (OUTPATIENT)
Dept: SCHEDULING | Age: 41
End: 2021-07-20

## 2021-07-20 DIAGNOSIS — Z12.31 VISIT FOR SCREENING MAMMOGRAM: Primary | ICD-10-CM

## 2021-08-16 ENCOUNTER — HOSPITAL ENCOUNTER (OUTPATIENT)
Dept: MAMMOGRAPHY | Age: 41
Discharge: HOME | End: 2021-08-16
Attending: OBSTETRICS & GYNECOLOGY
Payer: COMMERCIAL

## 2021-08-16 DIAGNOSIS — Z12.31 VISIT FOR SCREENING MAMMOGRAM: ICD-10-CM

## 2021-08-16 PROCEDURE — 77063 BREAST TOMOSYNTHESIS BI: CPT

## 2021-10-26 DIAGNOSIS — G43.809 OTHER MIGRAINE WITHOUT STATUS MIGRAINOSUS, NOT INTRACTABLE: ICD-10-CM

## 2021-10-26 DIAGNOSIS — R73.9 HYPERGLYCEMIA: ICD-10-CM

## 2021-10-26 DIAGNOSIS — Z82.49 FAMILY HISTORY OF PREMATURE CAD: ICD-10-CM

## 2021-10-26 DIAGNOSIS — E03.9 ACQUIRED HYPOTHYROIDISM: Primary | ICD-10-CM

## 2021-11-30 ENCOUNTER — APPOINTMENT (OUTPATIENT)
Dept: INTERNAL MEDICINE CLINIC | Age: 41
End: 2021-11-30

## 2021-12-01 LAB
ALBUMIN SERPL-MCNC: 4.3 G/DL (ref 3.8–4.8)
ALBUMIN/GLOB SERPL: 1.7 {RATIO} (ref 1.2–2.2)
ALP SERPL-CCNC: 64 IU/L (ref 44–121)
ALT SERPL-CCNC: 21 IU/L (ref 0–32)
AST SERPL-CCNC: 16 IU/L (ref 0–40)
BASOPHILS # BLD AUTO: 0 X10E3/UL (ref 0–0.2)
BASOPHILS NFR BLD AUTO: 1 %
BILIRUB SERPL-MCNC: 0.3 MG/DL (ref 0–1.2)
BUN SERPL-MCNC: 9 MG/DL (ref 6–24)
BUN/CREAT SERPL: 12 (ref 9–23)
CALCIUM SERPL-MCNC: 9.3 MG/DL (ref 8.7–10.2)
CHLORIDE SERPL-SCNC: 100 MMOL/L (ref 96–106)
CHOLEST SERPL-MCNC: 186 MG/DL (ref 100–199)
CO2 SERPL-SCNC: 24 MMOL/L (ref 20–29)
CREAT SERPL-MCNC: 0.77 MG/DL (ref 0.57–1)
EOSINOPHIL # BLD AUTO: 0.3 X10E3/UL (ref 0–0.4)
EOSINOPHIL NFR BLD AUTO: 4 %
ERYTHROCYTE [DISTWIDTH] IN BLOOD BY AUTOMATED COUNT: 12.7 % (ref 11.7–15.4)
GLOBULIN SER CALC-MCNC: 2.6 G/DL (ref 1.5–4.5)
GLUCOSE SERPL-MCNC: 84 MG/DL (ref 65–99)
HBA1C MFR BLD: 5.5 % (ref 4.8–5.6)
HCT VFR BLD AUTO: 40.8 % (ref 34–46.6)
HDLC SERPL-MCNC: 40 MG/DL
HGB BLD-MCNC: 13.5 G/DL (ref 11.1–15.9)
IMM GRANULOCYTES # BLD AUTO: 0 X10E3/UL (ref 0–0.1)
IMM GRANULOCYTES NFR BLD AUTO: 0 %
IMP & REVIEW OF LAB RESULTS: NORMAL
LDLC SERPL CALC-MCNC: 114 MG/DL (ref 0–99)
LYMPHOCYTES # BLD AUTO: 1.8 X10E3/UL (ref 0.7–3.1)
LYMPHOCYTES NFR BLD AUTO: 22 %
MCH RBC QN AUTO: 31.8 PG (ref 26.6–33)
MCHC RBC AUTO-ENTMCNC: 33.1 G/DL (ref 31.5–35.7)
MCV RBC AUTO: 96 FL (ref 79–97)
MONOCYTES # BLD AUTO: 0.5 X10E3/UL (ref 0.1–0.9)
MONOCYTES NFR BLD AUTO: 6 %
NEUTROPHILS # BLD AUTO: 5.5 X10E3/UL (ref 1.4–7)
NEUTROPHILS NFR BLD AUTO: 67 %
PLATELET # BLD AUTO: 472 X10E3/UL (ref 150–450)
POTASSIUM SERPL-SCNC: 3.6 MMOL/L (ref 3.5–5.2)
PROT SERPL-MCNC: 6.9 G/DL (ref 6–8.5)
RBC # BLD AUTO: 4.24 X10E6/UL (ref 3.77–5.28)
SODIUM SERPL-SCNC: 141 MMOL/L (ref 134–144)
T4 FREE SERPL-MCNC: 1.09 NG/DL (ref 0.82–1.77)
TRIGL SERPL-MCNC: 181 MG/DL (ref 0–149)
TSH SERPL DL<=0.005 MIU/L-ACNC: 1.74 UIU/ML (ref 0.45–4.5)
VLDLC SERPL CALC-MCNC: 32 MG/DL (ref 5–40)
WBC # BLD AUTO: 8.1 X10E3/UL (ref 3.4–10.8)

## 2021-12-02 NOTE — PROGRESS NOTES
I will discuss results with her at her upcoming appointment. Her A1c, CMP, and TFTs are normal.  Her CBC is unremarkable except for a mildly elevated platelet count of 208. Her lipid panel shows: Her total cholesterol is 186. Triglycerides are 121. HDL is 40. LDL is 114, up from 109 two years ago.     Dr. Collazo President  [facility], [address] [place] [address], [address] [place], [place] [address]  Phone: [phone]  Fax: [phone]

## 2021-12-07 NOTE — PROGRESS NOTES
Beau Yuma presents today for   Chief Complaint   Patient presents with    Follow-up    Labs     11-30-21              1. \"Have you been to the ER, urgent care clinic since your last visit? Hospitalized since your last visit? \" {yes    2. \"Have you seen or consulted any other health care providers outside of the [address] [place] [address] since your last visit? \" yes     4. For patients aged 41-77: Has the patient had a mammogram within the past 2 years?  no

## 2021-12-08 ENCOUNTER — OFFICE VISIT (OUTPATIENT)
Dept: INTERNAL MEDICINE CLINIC | Age: 41
End: 2021-12-08
Payer: COMMERCIAL

## 2021-12-08 ENCOUNTER — HOSPITAL ENCOUNTER (OUTPATIENT)
Dept: LAB | Age: 41
Discharge: HOME | End: 2021-12-08
Payer: COMMERCIAL

## 2021-12-08 VITALS
DIASTOLIC BLOOD PRESSURE: 90 MMHG | BODY MASS INDEX: 38.19 KG/M2 | WEIGHT: 252 LBS | SYSTOLIC BLOOD PRESSURE: 145 MMHG | TEMPERATURE: 97.7 F | HEART RATE: 83 BPM | OXYGEN SATURATION: 97 % | RESPIRATION RATE: 16 BRPM | HEIGHT: 68 IN

## 2021-12-08 DIAGNOSIS — R06.83 SNORING: ICD-10-CM

## 2021-12-08 DIAGNOSIS — R79.89 ABNORMAL CBC: ICD-10-CM

## 2021-12-08 DIAGNOSIS — I10 HYPERTENSION, UNSPECIFIED TYPE: ICD-10-CM

## 2021-12-08 DIAGNOSIS — E03.9 ACQUIRED HYPOTHYROIDISM: Primary | ICD-10-CM

## 2021-12-08 DIAGNOSIS — E78.5 HYPERLIPIDEMIA, UNSPECIFIED HYPERLIPIDEMIA TYPE: ICD-10-CM

## 2021-12-08 DIAGNOSIS — G43.809 OTHER MIGRAINE WITHOUT STATUS MIGRAINOSUS, NOT INTRACTABLE: ICD-10-CM

## 2021-12-08 LAB
BASOPHILS # BLD: 0 K/UL (ref 0–0.1)
BASOPHILS NFR BLD: 1 % (ref 0–2)
DIFFERENTIAL METHOD BLD: ABNORMAL
EOSINOPHIL # BLD: 0.4 K/UL (ref 0–0.4)
EOSINOPHIL NFR BLD: 4 % (ref 0–5)
ERYTHROCYTE [DISTWIDTH] IN BLOOD BY AUTOMATED COUNT: 12.5 % (ref 11.6–14.5)
HCT VFR BLD AUTO: 38.5 % (ref 35–45)
HGB BLD-MCNC: 12.7 G/DL (ref 12–16)
IMM GRANULOCYTES # BLD AUTO: 0 K/UL (ref 0–0.04)
IMM GRANULOCYTES NFR BLD AUTO: 0 % (ref 0–0.5)
LYMPHOCYTES # BLD: 1.5 K/UL (ref 0.9–3.6)
LYMPHOCYTES NFR BLD: 19 % (ref 21–52)
MCH RBC QN AUTO: 31.6 PG (ref 24–34)
MCHC RBC AUTO-ENTMCNC: 33 G/DL (ref 31–37)
MCV RBC AUTO: 95.8 FL (ref 78–100)
MONOCYTES # BLD: 0.6 K/UL (ref 0.05–1.2)
MONOCYTES NFR BLD: 8 % (ref 3–10)
NEUTS SEG # BLD: 5.6 K/UL (ref 1.8–8)
NEUTS SEG NFR BLD: 69 % (ref 40–73)
NRBC # BLD: 0 K/UL (ref 0–0.01)
NRBC BLD-RTO: 0 PER 100 WBC
PLATELET # BLD AUTO: 413 K/UL (ref 135–420)
PMV BLD AUTO: 9.1 FL (ref 9.2–11.8)
RBC # BLD AUTO: 4.02 M/UL (ref 4.2–5.3)
WBC # BLD AUTO: 8.1 K/UL (ref 4.6–13.2)

## 2021-12-08 PROCEDURE — 85025 COMPLETE CBC W/AUTO DIFF WBC: CPT

## 2021-12-08 PROCEDURE — 99214 OFFICE O/P EST MOD 30 MIN: CPT | Performed by: INTERNAL MEDICINE

## 2021-12-08 RX ORDER — METOPROLOL SUCCINATE 25 MG/1
25 TABLET, EXTENDED RELEASE ORAL DAILY
Qty: 30 TABLET | Refills: 5 | Status: SHIPPED | OUTPATIENT
Start: 2021-12-08

## 2021-12-08 RX ORDER — EPINEPHRINE 0.3 MG/.3ML
0.3 INJECTION SUBCUTANEOUS
Status: CANCELLED | OUTPATIENT
Start: 2021-12-08 | End: 2021-12-08

## 2021-12-08 NOTE — PATIENT INSTRUCTIONS
Eating Healthy Foods: Care Instructions  Your Care Instructions     Eating healthy foods can help lower your risk for disease. Healthy food gives you energy and keeps your heart strong, your brain active, your muscles working, and your bones strong. A healthy diet includes a variety of foods from the basic food groups: grains, vegetables, fruits, milk and milk products, and meat and beans. Some people may eat more of their favorite foods from only one food group and, as a result, miss getting the nutrients they need. So, it is important to pay attention not only to what you eat but also to what you are missing from your diet. You can eat a healthy, balanced diet by making a few small changes. Follow-up care is a key part of your treatment and safety. Be sure to make and go to all appointments, and call your doctor if you are having problems. It's also a good idea to know your test results and keep a list of the medicines you take. How can you care for yourself at home? Look at what you eat  · Keep a food diary for a week or two and record everything you eat or drink. Track the number of servings you eat from each food group. · For a balanced diet every day, eat a variety of:  ? 6 or more ounce-equivalents of grains, such as cereals, breads, crackers, rice, or pasta, every day. An ounce-equivalent is 1 slice of bread, 1 cup of ready-to-eat cereal, or ½ cup of cooked rice, cooked pasta, or cooked cereal.  ? 2½ cups of vegetables, especially:  § Dark-green vegetables such as broccoli and spinach. § Orange vegetables such as carrots and sweet potatoes. § Dry beans (such as geller and kidney beans) and peas (such as lentils). ? 2 cups of fresh, frozen, or canned fruit. A small apple or 1 banana or orange equals 1 cup. ? 3 cups of nonfat or low-fat milk, yogurt, or other milk products. ? 5½ ounces of meat and beans, such as chicken, fish, lean meat, beans, nuts, and seeds.  One egg, 1 tablespoon of peanut butter, ½ ounce nuts or seeds, or ¼ cup of cooked beans equals 1 ounce of meat. · Learn how to read food labels for serving sizes and ingredients. Fast-food and convenience-food meals often contain few or no fruits or vegetables. Make sure you eat some fruits and vegetables to make the meal more nutritious. · Look at your food diary. For each food group, add up what you have eaten and then divide the total by the number of days. This will give you an idea of how much you are eating from each food group. See if you can find some ways to change your diet to make it more healthy. Start small  · Do not try to make dramatic changes to your diet all at once. You might feel that you are missing out on your favorite foods and then be more likely to fail. · Start slowly, and gradually change your habits. Try some of the following:  ? Use whole wheat bread instead of white bread. ? Use nonfat or low-fat milk instead of whole milk. ? Eat brown rice instead of white rice, and eat whole wheat pasta instead of white-flour pasta. ? Try low-fat cheeses and low-fat yogurt. ? Add more fruits and vegetables to meals and have them for snacks. ? Add lettuce, tomato, cucumber, and onion to sandwiches. ? Add fruit to yogurt and cereal.  Enjoy food  · You can still eat your favorite foods. You just may need to eat less of them. If your favorite foods are high in fat, salt, and sugar, limit how often you eat them, but do not cut them out entirely. · Eat a wide variety of foods. Make healthy choices when eating out  · The type of restaurant you choose can help you make healthy choices. Even fast-food chains are now offering more low-fat or healthier choices on the menu. · Choose smaller portions, or take half of your meal home. · When eating out, try:  ? A veggie pizza with a whole wheat crust or grilled chicken (instead of sausage or pepperoni).   ? Pasta with roasted vegetables, grilled chicken, or marinara sauce instead of cream sauce. ? A vegetable wrap or grilled chicken wrap. ? Broiled or poached food instead of fried or breaded items. Make healthy choices easy  · Buy packaged, prewashed, ready-to-eat fresh vegetables and fruits, such as baby carrots, salad mixes, and chopped or shredded broccoli and cauliflower. · Buy packaged, presliced fruits, such as melon or pineapple. · Choose 100% fruit or vegetable juice instead of soda. Limit juice intake to 4 to 6 oz (½ to ¾ cup) a day. · Blend low-fat yogurt, fruit juice, and canned or frozen fruit to make a smoothie for breakfast or a snack. Where can you learn more? Go to http://www.mcwilli[employer].com/  Enter T756 in the search box to learn more about \"Eating Healthy Foods: Care Instructions. \"  Current as of: December 17, 2020               Content Version: 13.0  © 2006-2021 [employer]. Care instructions adapted under license by [employer] (which disclaims liability or warranty for this information). If you have questions about a medical condition or this instruction, always ask your healthcare professional. [place] [address] any warranty or liability for your use of this information.

## 2021-12-08 NOTE — PROGRESS NOTES
INTERNISTS OF [facility]:  12/8/2021, MRN: [id number]      Link Estrada is a [address] y.o. female and presents to clinic for Follow-up and Labs (11-30-21)      Subjective: The pt is a 36yo female with h/o hypothyroidism, migraine HAs (refractory to botox rx and multiple rx in the past), thoracic outlet syndrome (left) s/p surgery 12/17, and kidney stones. 1. HLD: +Family h/o CVD. Her most recent labs show: Her A1c, CMP, and TFTs are normal. Her lipid panel shows: Her total cholesterol is 186. Triglycerides are 121. HDL is 40. LDL is 114, up from 109 two years ago. She is trying to keto based diet. 2.  Hypothyroidism: Compliant with medication. +Fatigue. Her most recent TFTs were normal. She snores in her sleep. 3.  Migraine Headaches: Followed by [address] [place] [address] Neurology. On Nurtec as needed. Today she reports: she saw a homeopathic doctor who determined soy products trigger her HAs. Since avoiding soy and dairy products, she has had less HAs \"substantially. \"    4. HTN: BP is 145/90. Norvasc was added at her last apt. She had associated BLE edema and stopped this rx. Her most recent labs show a normal creatinine. She is having \"panic attacks\" in which her heart is pounding. Her chest feels tight. Sx are triggered from stress. +Snoring. 5. Stress: Her GM passed away in [place]. Her son is working at [employer] ([id number]). She prefers not to take any rx. No ETOH/drug use. She sees a therapist twice a month. 6. LLE Pain: She has pain along her left groin that radiates down her thigh since she had surgery to remove omental/pelvic adhesions. No paresthesias/weakness.          Patient Active Problem List    Diagnosis Date Noted    Severe obesity ([address] 03/08/2019    Family history of premature CAD 12/14/2018    Acquired hypothyroidism 10/07/2018    Migraine 10/07/2018       Current Outpatient Medications   Medication Sig Dispense Refill    amLODIPine (NORVASC) 5 mg tablet Take 1 Tab by mouth daily. (Patient taking differently: Take 5 mg by mouth every evening.) 30 Tab 5    rimegepant (NURTEC) 75 mg disintegrating tablet Take 1 Tab by mouth as needed for Migraine. Max 1 dose in 24 hours. 15 Tab 5    TENS unit and electrodes (Cefaly) cmpk Cefaly dual. 1 Each 3    acetaminophen (TYLENOL) 325 mg tablet Take 2 Tabs by mouth every six (6) hours as needed for Pain. 20 Tab 0    thyroid, Pork, ([place] Thyroid) 60 mg tablet Take  by mouth daily.          Allergies   Allergen Reactions    Codeine Hives, Itching, Swelling and Anaphylaxis    Emgality Pen [Galcanezumab-Gnlm] Rash    Shellfish Derived Shortness of Breath       Past Medical History:   Diagnosis Date    Adverse effect of anesthesia     HX OF COLLAPSED LUNG    Benign tumor of cervix     Calculus of kidney     Elevated BP without diagnosis of hypertension     Endometriosis     Fracture of finger of right hand 2018    5th digit    H/O thoracic outlet syndrome     Headache     Hypertension     Ill-defined condition     OVARIAN CYST    Ill-defined condition     COLLAPSED LUNG    Migraine headache     Nausea & vomiting     Thyroid disease     hypothyroidism    Trauma        Past Surgical History:   Procedure Laterality Date    HX BREAST REDUCTION      bilateral    HX  SECTION      x2    HX CHOLECYSTECTOMY      HX DILATION AND CURETTAGE      x3    HX HYSTERECTOMY  2015    HX OTHER SURGICAL  2017    REMOVED EXTRA RIB IN SPINE    HX PARTIAL HYSTERECTOMY      HX PELVIC LAPAROSCOPY      VASCULAR SURGERY PROCEDURE UNLIST         Family History   Problem Relation Age of Onset    Migraines Mother     Hypertension Mother     Cancer Father     Heart Disease Father     Heart Attack Father     No Known Problems Sister     Diabetes Maternal Grandmother     Dementia Maternal Grandmother     No Known Problems Maternal Grandfather     No Known Problems Paternal Grandmother     No Known Problems Paternal Grandfather [facility] Migraines Son     Migraines Daughter        Social History     Tobacco Use    Smoking status: Never Smoker    Smokeless tobacco: Never Used   Substance Use Topics    Alcohol use: Yes     Comment: rare       ROS   Review of Systems   Constitutional: Negative for chills and fever. HENT: Negative for ear pain and sore throat. Eyes: Negative for blurred vision and pain. Respiratory: Negative for cough and shortness of breath. Cardiovascular: Negative for chest pain. Gastrointestinal: Negative for abdominal pain, blood in stool and melena. Genitourinary: Negative for dysuria and hematuria. Musculoskeletal: Negative for joint pain and myalgias. Skin: Negative for rash. Neurological: Negative for headaches. Endo/Heme/Allergies: Does not bruise/bleed easily. Psychiatric/Behavioral: Negative for substance abuse. Objective     Vitals:    12/08/21 0806   Temp: 97.7 °F (36.5 °C)   TempSrc: Temporal   Weight: 252 lb (114.3 kg)   PainSc:   0 - No pain       Physical Exam  Vitals and nursing note reviewed. HENT:      Head: Normocephalic and atraumatic. Right Ear: External ear normal.      Left Ear: External ear normal.   Eyes:      General: No scleral icterus. Right eye: No discharge. Left eye: No discharge. Conjunctiva/sclera: Conjunctivae normal.   Cardiovascular:      Rate and Rhythm: Normal rate and regular rhythm. Heart sounds: Normal heart sounds. No murmur heard. No friction rub. No gallop. Pulmonary:      Effort: Pulmonary effort is normal. No respiratory distress. Breath sounds: Normal breath sounds. No wheezing or rales. Chest:      Chest wall: No tenderness. Abdominal:      General: Bowel sounds are normal. There is no distension. Palpations: Abdomen is soft. There is no mass. Tenderness: There is no abdominal tenderness. There is no guarding or rebound. Musculoskeletal:         General: No swelling (BUE) or tenderness (BUE). Cervical back: Neck supple. Lymphadenopathy:      Cervical: No cervical adenopathy. Skin:     General: Skin is warm and dry. Findings: No erythema or rash. Neurological:      Mental Status: She is alert. Motor: No abnormal muscle tone. Gait: Gait normal.   Psychiatric:         Mood and Affect: Mood normal.         LABS   Data Review:   Lab Results   Component Value Date/Time    WBC 8.1 11/30/2021 12:00 AM    HGB 13.5 11/30/2021 12:00 AM    HCT 40.8 11/30/2021 12:00 AM    PLATELET 248 (H) 12/15/3658 12:00 AM    MCV 96 11/30/2021 12:00 AM       Lab Results   Component Value Date/Time    Sodium 141 11/30/2021 12:00 AM    Potassium 3.6 11/30/2021 12:00 AM    Chloride 100 11/30/2021 12:00 AM    CO2 24 11/30/2021 12:00 AM    Anion gap 6 08/21/2019 11:52 AM    Glucose 84 11/30/2021 12:00 AM    BUN 9 11/30/2021 12:00 AM    Creatinine 0.77 11/30/2021 12:00 AM    BUN/Creatinine ratio 12 11/30/2021 12:00 AM    GFR est  11/30/2021 12:00 AM    GFR est non-AA 96 11/30/2021 12:00 AM    Calcium 9.3 11/30/2021 12:00 AM       Lab Results   Component Value Date/Time    Cholesterol, total 186 11/30/2021 12:00 AM    HDL Cholesterol 40 11/30/2021 12:00 AM    LDL, calculated 114 (H) 11/30/2021 12:00 AM    LDL, calculated 109.8 (H) 08/21/2019 11:52 AM    VLDL, calculated 32 11/30/2021 12:00 AM    VLDL, calculated 18.2 08/21/2019 11:52 AM    Triglyceride 181 (H) 11/30/2021 12:00 AM    CHOL/HDL Ratio 4.2 08/21/2019 11:52 AM       Lab Results   Component Value Date/Time    Hemoglobin A1c 5.5 11/30/2021 12:00 AM    Hemoglobin A1c, External 5.3 11/09/2018 12:00 AM       Assessment/Plan:   1. Hypertension: Stable.  -Norvasc added as a medication intolerance. - Adding metoprolol. RTC for a BP check in 1-2 months.  -I will follow with her in 6 months.  -Check labs in 6 months. 2.  Hyperlipidemia:  -I encouraged her to maintain a heart healthy diet to reduce her weight.  -I will check labs in 6 months.     3. Hypothyroidism: Results of TFTs are reassuring but she is having fatigue. Stress? +Snoring hx. she declines medication for stress.  -Continue medication as prescribed.  -We will check labs in 6 months. - Referral to Sleep Medicine to r/o JACKIE or sleep disorder. - I encouraged her to f/u with her therapist for sessions. 4. Migraine HAs: Stable. Improved after eliminating different foods from her diet.  -Continue medication as prescribed.  -I encouraged her to follow-up with Neurology     5. LLE Pain: Mostly along her groin. Likely from adhesions. PE findings are reassuring today.   -I instructed her to notify me if her pain is persistent and worsening. At that time, I would recommend we get an abdominal CT scan of her pelvis to assess for new adhesions given her history of abdominal surgery    Health Maintenance Due   Topic Date Due    Hepatitis C Screening  Never done    COVID-19 Vaccine (1) Never done    DTaP/Tdap/Td series (1 - Tdap) Never done    Flu Vaccine (1) Never done     Lab review: labs are reviewed in the EHR and ordered as mentioned above    I have discussed the diagnosis with the patient and the intended plan as seen in the above orders. The patient has received an after-visit summary and questions were answered concerning future plans. I have discussed medication side effects and warnings with the patient as well. I have reviewed the plan of care with the patient, accepted their input and they are in agreement with the treatment goals. All questions were answered. The patient understands the plan of care. Handouts provided today with above information. Pt instructed if symptoms worsen to call the office or report to the ED for continued care. Greater than 50% of the visit time was spent in counseling and/or coordination of care. Voice recognition was used to generate this report, which may have resulted in some phonetic based errors in grammar and contents.  Even though attempts were made to correct all the mistakes, some may have been missed, and remained in the body of the document.           Russ Etienne MD

## 2021-12-10 RX ORDER — EPINEPHRINE 0.3 MG/.3ML
0.3 INJECTION SUBCUTANEOUS
Qty: 2 EACH | Refills: 2 | Status: SHIPPED | OUTPATIENT
Start: 2021-12-10 | End: 2021-12-10

## 2021-12-10 NOTE — TELEPHONE ENCOUNTER
Pt says when she was her for appt she was supposed to get rx for Epi pen. She says nurse was supposed to put in the request. Says she used to get from old doctor but hers is now     [facility] on [address] [facility] [address].

## 2021-12-14 NOTE — PROGRESS NOTES
Please let her know that her platelet count is normal.    Dr. Dion Figueroa  Internists of [facility], [address] [facility], [facility] [address]  Phone: [phone]  Fax: [phone]

## 2022-01-25 ENCOUNTER — OFFICE VISIT (OUTPATIENT)
Dept: INTERNAL MEDICINE CLINIC | Age: 42
End: 2022-01-25
Payer: COMMERCIAL

## 2022-01-25 VITALS
BODY MASS INDEX: 37.41 KG/M2 | WEIGHT: 246.8 LBS | HEIGHT: 68 IN | RESPIRATION RATE: 16 BRPM | OXYGEN SATURATION: 97 % | DIASTOLIC BLOOD PRESSURE: 95 MMHG | SYSTOLIC BLOOD PRESSURE: 160 MMHG | HEART RATE: 64 BPM | TEMPERATURE: 97.4 F

## 2022-01-25 DIAGNOSIS — E03.9 ACQUIRED HYPOTHYROIDISM: ICD-10-CM

## 2022-01-25 DIAGNOSIS — I10 HYPERTENSION, UNSPECIFIED TYPE: ICD-10-CM

## 2022-01-25 DIAGNOSIS — F41.9 ANXIETY: ICD-10-CM

## 2022-01-25 DIAGNOSIS — R73.9 HYPERGLYCEMIA: ICD-10-CM

## 2022-01-25 DIAGNOSIS — M25.552 PELVIC JOINT PAIN, LEFT: Primary | ICD-10-CM

## 2022-01-25 DIAGNOSIS — M25.552 PELVIC JOINT PAIN, LEFT: ICD-10-CM

## 2022-01-25 PROCEDURE — 99214 OFFICE O/P EST MOD 30 MIN: CPT | Performed by: INTERNAL MEDICINE

## 2022-01-25 NOTE — PROGRESS NOTES
INTERNISTS OF [facility]:  1/25/2022, MRN: [id number]      Dori Grace is a 42 y.o. female and presents to clinic for Follow-up      Subjective: The pt is a 38yo female with h/o ?hypothyroidism, migraine HAs (refractory to botox rx and multiple rx in the past, followed by [facility]), thoracic outlet syndrome (left) s/p surgery 12/17, and kidney stones. 1. 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 She will let her  support her financially at this time. +H/o panic attacks. She is still participating in talk therapy. 2. HTN: +H/o norvasc intolerance. Metoprolol was added at her last apt. Given her h/o snoring, she was referred to Sleep Medicine. She is scheduled today with Sleep Medicine. She is scheduled with Cardiology soon. She reported palpitations at her last appointment. ? Anxiety triggered? Blood pressure is elevated today. BP Readings from Last 3 Encounters:   01/25/22 (!) 160/95   12/08/21 (!) 145/90   06/30/21 124/86     3. Abdominal Pain:  Reported at her last apt. Sx are unchanged. The pain is better with pushing along her LLQ groin area. +H/o pelvic surgery. Left lower quadrant pain is present since she had surgery to remove omental and pelvic muscle adhesions in the past.    4. Hypothyroidism: She stopped her thyroid rx in October. F/u labs in November were WNL. She felt \"icky\" when taking 60mg [place] pork thyroid rx. Patient Active Problem List    Diagnosis Date Noted    Severe obesity ([address] 03/08/2019    Family history of premature CAD 12/14/2018    Acquired hypothyroidism 10/07/2018    Migraine 10/07/2018       Current Outpatient Medications   Medication Sig Dispense Refill    metoprolol succinate (TOPROL-XL) 25 mg XL tablet Take 1 Tablet by mouth daily. 30 Tablet 5    rimegepant (NURTEC) 75 mg disintegrating tablet Take 1 Tab by mouth as needed for Migraine. Max 1 dose in 24 hours.  15 Tab 5    TENS unit and electrodes (Cefaly) [facility] C[facility]. 1 Each 3    acetaminophen (TYLENOL) 325 mg tablet Take 2 Tabs by mouth every six (6) hours as needed for Pain. 20 Tab 0    amLODIPine (NORVASC) 5 mg tablet Take 1 Tab by mouth daily. (Patient not taking: Reported on 2021) 30 Tab 5    thyroid, Pork, ([place] Thyroid) 60 mg tablet Take  by mouth daily.  (Patient not taking: Reported on 2021)         Allergies   Allergen Reactions    Codeine Hives, Itching, Swelling and Anaphylaxis    Emgality Pen [Galcanezumab-Gnlm] Rash    Norvasc [Amlodipine] Other (comments)     BLE edema    Shellfish Derived Shortness of Breath       Past Medical History:   Diagnosis Date    Adverse effect of anesthesia     HX OF COLLAPSED LUNG    Benign tumor of cervix     Calculus of kidney     Elevated BP without diagnosis of hypertension     Endometriosis     Fracture of finger of right hand 2018    5th digit    H/O thoracic outlet syndrome     Headache     Hypertension     Ill-defined condition     OVARIAN CYST    Ill-defined condition     COLLAPSED LUNG    Migraine headache     Nausea & vomiting     Thyroid disease     hypothyroidism    Trauma        Past Surgical History:   Procedure Laterality Date    HX BREAST REDUCTION      bilateral    HX  SECTION      x2    HX CHOLECYSTECTOMY      HX DILATION AND CURETTAGE      x3    HX HYSTERECTOMY  2015    HX OTHER SURGICAL  2017    REMOVED EXTRA RIB IN SPINE    HX PARTIAL HYSTERECTOMY      HX PELVIC LAPAROSCOPY      VASCULAR SURGERY PROCEDURE UNLIST         Family History   Problem Relation Age of Onset   [de-identified] Migraines Mother     Hypertension Mother     Cancer Father     Heart Disease Father     Heart Attack Father     No Known Problems Sister     Diabetes Maternal Grandmother     Dementia Maternal Grandmother     No Known Problems Maternal Grandfather     No Known Problems Paternal Grandmother     No Known Problems Paternal Grandfather     Migraines Son     Migraines Daughter        Social History     Tobacco Use    Smoking status: Never Smoker    Smokeless tobacco: Never Used   Substance Use Topics    Alcohol use: Yes     Comment: rare       ROS   Review of Systems   Constitutional: Negative for chills and fever. HENT: Negative for ear pain and sore throat. Eyes: Negative for blurred vision and pain. Respiratory: Negative for cough and shortness of breath. Cardiovascular: Negative for chest pain. Gastrointestinal: Positive for abdominal pain (unchanged). Negative for blood in stool and melena. Genitourinary: Negative for dysuria and hematuria. Musculoskeletal: Negative for joint pain and myalgias. Skin: Negative for rash. Neurological: Negative for headaches. Endo/Heme/Allergies: Does not bruise/bleed easily. Psychiatric/Behavioral: Negative for substance abuse. The patient is nervous/anxious (improved since she quit her job). Objective     Vitals:    01/25/22 0837 01/25/22 0841   BP: (!) 146/89 (!) 160/95   Pulse: 64    Resp: 16    Temp: 97.4 °F (36.3 °C)    TempSrc: Temporal    SpO2: 97%    Weight: 246 lb 12.8 oz (111.9 kg)    Height: 5' 8\" (1.727 m)    PainSc:   0 - No pain        Physical Exam  Vitals and nursing note reviewed. HENT:      Head: Normocephalic and atraumatic. Right Ear: External ear normal.      Left Ear: External ear normal.   Eyes:      General: No scleral icterus. Right eye: No discharge. Left eye: No discharge. Conjunctiva/sclera: Conjunctivae normal.   Cardiovascular:      Rate and Rhythm: Normal rate and regular rhythm. Heart sounds: Normal heart sounds. No murmur heard. No friction rub. No gallop. Pulmonary:      Effort: Pulmonary effort is normal. No respiratory distress. Breath sounds: Normal breath sounds. No wheezing or rales. Chest:      Chest wall: No tenderness. Abdominal:      General: Bowel sounds are normal. There is no distension. Palpations: Abdomen is soft. There is no mass. Tenderness: There is abdominal tenderness (along her LLQ mildly). There is no guarding or rebound. Musculoskeletal:         General: No swelling (BUE) or tenderness (BUE). Cervical back: Neck supple. Lymphadenopathy:      Cervical: No cervical adenopathy. Skin:     General: Skin is warm and dry. Findings: No erythema or rash. Neurological:      Mental Status: She is alert. Motor: No abnormal muscle tone. Gait: Gait normal.   Psychiatric:         Mood and Affect: Mood normal.         LABS   Data Review:   Lab Results   Component Value Date/Time    WBC 8.1 12/08/2021 08:56 AM    HGB 12.7 12/08/2021 08:56 AM    HCT 38.5 12/08/2021 08:56 AM    PLATELET 096 71/22/1848 08:56 AM    MCV 95.8 12/08/2021 08:56 AM       Lab Results   Component Value Date/Time    Sodium 141 11/30/2021 12:00 AM    Potassium 3.6 11/30/2021 12:00 AM    Chloride 100 11/30/2021 12:00 AM    CO2 24 11/30/2021 12:00 AM    Anion gap 6 08/21/2019 11:52 AM    Glucose 84 11/30/2021 12:00 AM    BUN 9 11/30/2021 12:00 AM    Creatinine 0.77 11/30/2021 12:00 AM    BUN/Creatinine ratio 12 11/30/2021 12:00 AM    GFR est  11/30/2021 12:00 AM    GFR est non-AA 96 11/30/2021 12:00 AM    Calcium 9.3 11/30/2021 12:00 AM       Lab Results   Component Value Date/Time    Cholesterol, total 186 11/30/2021 12:00 AM    HDL Cholesterol 40 11/30/2021 12:00 AM    LDL, calculated 114 (H) 11/30/2021 12:00 AM    LDL, calculated 109.8 (H) 08/21/2019 11:52 AM    VLDL, calculated 32 11/30/2021 12:00 AM    VLDL, calculated 18.2 08/21/2019 11:52 AM    Triglyceride 181 (H) 11/30/2021 12:00 AM    CHOL/HDL Ratio 4.2 08/21/2019 11:52 AM       Lab Results   Component Value Date/Time    Hemoglobin A1c 5.5 11/30/2021 12:00 AM    Hemoglobin A1c, External 5.3 11/09/2018 12:00 AM       Assessment/Plan:   1. Pelvic joint pain, left: From adhesions? Hernia? Ordering a pelvic CT to investigate her pain.     ORDERS:  - CT PELV W WO CONT; Future    2. Hypertension: +H/o palpitations, reported at her last appointment. BP is elevated from stress vs benign essential hypertension? +H/o elevated blood glucose noted on a previous lab. Last A1C was WNL though. I encouraged her to follow-up with Cardiology  Continue with medication as prescribed. I encouraged her to check her blood pressures outside our office and to notify us if they are consistently greater than 140/90. Checking labs just before her follow-up visit in 4 months. 3.  Anxiety: Improved when she made a decision to quit her job. I encouraged her to continue with talk therapy sessions. She does not believe she need medication for anxiety at this time. Notify me if symptoms worsen. 4. H/o Hypothyroidism: TFTs are normal but the patient has not been on medication for months. We will proceed with observation for now. Checking labs in 4 months. Health Maintenance Due   Topic Date Due    Hepatitis C Screening  Never done    COVID-19 Vaccine (1) Never done         Lab review: labs are reviewed in the EHR and ordered as mentioned above    I have discussed the diagnosis with the patient and the intended plan as seen in the above orders. The patient has received an after-visit summary and questions were answered concerning future plans. I have discussed medication side effects and warnings with the patient as well. I have reviewed the plan of care with the patient, accepted their input and they are in agreement with the treatment goals. All questions were answered. The patient understands the plan of care. Handouts provided today with above information. Pt instructed if symptoms worsen to call the office or report to the ED for continued care. Greater than 50% of the visit time was spent in counseling and/or coordination of care. Voice recognition was used to generate this report, which may have resulted in some phonetic based errors in grammar and contents.  Even though attempts were made to correct all the mistakes, some may have been missed, and remained in the body of the document.           Rainell Hamman, MD

## 2022-01-25 NOTE — PATIENT INSTRUCTIONS
Work-Life Balance: Care Instructions  Your Care Instructions    Do you ever feel like there is not enough time to do all of the things you have to do, and no time at all for the things you enjoy? If so, you are not alone. On average, people in the [place] have worked more and more hours each year since 1970. But in recent years, fewer people say they want to take on more at work, even if they would get promoted or get paid more money. More and more workers say they want time to spend with their families and to do things that are important to them. Do you ever feel:  · That you always have more and more work to do at your job? · That too many people depend on you every day? · That you never have enough time for your family or friends? · That you never have time for hobbies or things you enjoy? · That each second of your day is scheduled? If you answered \"yes\" to any of these questions, take steps at work and at home to get your life into balance. Follow-up care is a key part of your treatment and safety. Be sure to make and go to all appointments, and call your doctor if you are having problems. How can you care for yourself at home? Manage your time  · Focus on the important things. Taking on too much can wear you out. Look at how you spend your time, and redirect your focus. Learn to say \"no\" and let go of things that do not matter. · Set one small goal at a time. Use a day planner. Break large projects into smaller ones. · Ask for help. Let your children, your spouse, your coworkers, and other people in your life help you get things done. · Leave your job at the office. If you give up free time to get more work done, you may pay for it with stress. If your job offers a flexible work schedule, use it to fit your own work style. For instance, come in earlier to have a longer lunch break, or make time for a yoga class or workout during your workday. · Unplug.  Do not let technology (such as your cell phone or the Internet) erase the line between your time and your employer's time. Lower job stress  Job stress causes trouble at work and at home. At work, you may worry about things you have not had time to do at home. At home, you may worry about your job. This cycle upsets your work-life balance. Lowering your job stress can get your life back in balance. Job stress can be caused by:  · Pressure and deadlines. · Heavy workloads or long hours. · Not being allowed to make decisions. · Health and safety hazards. · Feeling you may lose your job. · Unclear or changing job duties. · Too much responsibility. · Work that is very tiring or boring. Do any of these things bother you? Consider talking with your boss to change things. There are some things that you may not be able to control. But even a few small changes might help lower your stress. Take advantage of programs at work  Businesses make money and are better off in other ways if their employees are healthy and happy. For this reason, many companies have programs to help balance work life and home life. These programs may include:  · Flexible schedules and hours. · Time off for family reasons, education, or community service. · Being able to work from home. · Employee assistance programs to provide counseling. · Child-care programs. Check to see if your company has any of these or other programs that could help you. If not, consider talking to your boss about why work-life balance programs make good business sense. Even if your company does not start an official program, you may be able to get flexible hours, time off, or the ability to do some work from home. Know when to quit  If you are truly unhappy because of a stressful job, and if the suggestions here have not worked, it may be time to think about changing jobs or changing careers. But before you quit, take time to research your options. Where can you learn more?   Go to http://www.gray.com/  Enter [id number] in the search box to learn more about \"Work-Life Balance: Care Instructions. \"  Current as of: June 16, 2021               Content Version: 13.0  © 5585-5865 [employer], [employer]. Care instructions adapted under license by [employer] (which disclaims liability or warranty for this information). If you have questions about a medical condition or this instruction, always ask your healthcare professional. [address] any warranty or liability for your use of this information.

## 2022-01-25 NOTE — PROGRESS NOTES
Chief Complaint   Patient presents with    Follow-up         Learning Assessment 8/21/2019   PRIMARY LEARNER Patient   HIGHEST LEVEL OF EDUCATION - PRIMARY LEARNER  2 YEARS OF COLLEGE   BARRIERS PRIMARY LEARNER NONE   CO-LEARNER CAREGIVER No   PRIMARY LANGUAGE ENGLISH   LEARNER PREFERENCE PRIMARY DEMONSTRATION   ANSWERED BY patient   RELATIONSHIP SELF     3 most recent PHQ Screens 12/8/2021   Little interest or pleasure in doing things Not at all   Feeling down, depressed, irritable, or hopeless Not at all   Total Score PHQ 2 0     No flowsheet data found. Abuse Screening Questionnaire 12/8/2021   Do you ever feel afraid of your partner? N   Are you in a relationship with someone who physically or mentally threatens you? N   Is it safe for you to go home?  Jennifer Patton

## 2022-02-02 PROBLEM — F41.9 ANXIETY: Status: ACTIVE | Noted: 2022-02-02

## 2022-02-02 PROBLEM — I10 HYPERTENSION: Status: ACTIVE | Noted: 2022-02-02

## 2022-02-04 ENCOUNTER — OFFICE VISIT (OUTPATIENT)
Dept: CARDIOLOGY CLINIC | Age: 42
End: 2022-02-04
Payer: COMMERCIAL

## 2022-02-04 ENCOUNTER — HOSPITAL ENCOUNTER (OUTPATIENT)
Dept: CT IMAGING | Age: 42
Discharge: HOME | End: 2022-02-04
Attending: INTERNAL MEDICINE
Payer: COMMERCIAL

## 2022-02-04 VITALS
SYSTOLIC BLOOD PRESSURE: 138 MMHG | BODY MASS INDEX: 37.28 KG/M2 | DIASTOLIC BLOOD PRESSURE: 88 MMHG | OXYGEN SATURATION: 98 % | HEIGHT: 68 IN | WEIGHT: 246 LBS | HEART RATE: 76 BPM

## 2022-02-04 DIAGNOSIS — R03.0 BORDERLINE HYPERTENSION: ICD-10-CM

## 2022-02-04 DIAGNOSIS — R07.9 CHEST PAIN, UNSPECIFIED TYPE: ICD-10-CM

## 2022-02-04 DIAGNOSIS — Z82.49 FAMILY HISTORY OF PREMATURE CAD: Primary | ICD-10-CM

## 2022-02-04 DIAGNOSIS — M25.552 PELVIC JOINT PAIN, LEFT: ICD-10-CM

## 2022-02-04 PROCEDURE — 99214 OFFICE O/P EST MOD 30 MIN: CPT | Performed by: INTERNAL MEDICINE

## 2022-02-04 PROCEDURE — 72192 CT PELVIS W/O DYE: CPT

## 2022-02-04 NOTE — PROGRESS NOTES
[facility]    Chief Complaint   Patient presents with    Follow-up     6 month    Chest Pain     sharp and pressure     Palpitations     heart racing    HTN    HPI    [facility] is a 42 y.o. heart disease here for evaluation of hypertension. As you know this patient was referred to my office back in 2018 and saw my partner for evaluation of murmur. This led to a stress echocardiogram which I independently obtained and reviewed the report with her today and was completely normal.  She had no significant valvular pathology. Does have a known history of thoracic outlet syndrome but most recently has been dealing with issues status post an orthopedic injury to her right ankle as well as recurrent migraines that have sent her to the ER multiple times. She's on Mobic for her ankle pain/ swelling and says she meets back with Ortho  to decide if they will pursue surgery. She has a lot of pain in her ankle and is on crutches. Additionally, she was started on Emgality for migraine prophylaxis. In setting of a migraine her SBP is very high >180s but has been documented >160s when not an acute headache. If her injection doesn't work she takes Fioricet prn but not more than 1-2x/ month.     CV RFs: +FH her father  of complications of CA but says he had CABG in his 45s    Past Medical History:   Diagnosis Date    Adverse effect of anesthesia     HX OF COLLAPSED LUNG    Benign tumor of cervix     Calculus of kidney     Elevated BP without diagnosis of hypertension     Endometriosis     Fracture of finger of right hand 2018    5th digit    H/O thoracic outlet syndrome     Headache     Hypertension     Ill-defined condition     OVARIAN CYST    Ill-defined condition     COLLAPSED LUNG    Migraine headache     Nausea & vomiting     Thyroid disease     hypothyroidism    Trauma        Past Surgical History:   Procedure Laterality Date    HX BREAST REDUCTION      bilateral  HX  SECTION      x2    HX CHOLECYSTECTOMY      HX DILATION AND CURETTAGE      x3    HX HYSTERECTOMY  2015    HX OTHER SURGICAL  2017    REMOVED EXTRA RIB IN SPINE    HX PARTIAL HYSTERECTOMY      HX PELVIC LAPAROSCOPY      VASCULAR SURGERY PROCEDURE UNLIST         Current Outpatient Medications   Medication Sig Dispense Refill    metoprolol succinate (TOPROL-XL) 25 mg XL tablet Take 1 Tablet by mouth daily. 30 Tablet 5    rimegepant (NURTEC) 75 mg disintegrating tablet Take 1 Tab by mouth as needed for Migraine. Max 1 dose in 24 hours. 15 Tab 5    TENS unit and electrodes (Cefaly) cmpk Cefaly dual. 1 Each 3    acetaminophen (TYLENOL) 325 mg tablet Take 2 Tabs by mouth every six (6) hours as needed for Pain. 20 Tab 0       Allergies   Allergen Reactions    Codeine Hives, Itching, Swelling and Anaphylaxis    Emgality Pen [Galcanezumab-Gnlm] Rash    Norvasc [Amlodipine] Other (comments)     BLE edema    Shellfish Derived Shortness of Breath       Social History     Socioeconomic History    Marital status:      Spouse name: Not on file    Number of children: Not on file    Years of education: Not on file    Highest education level: Not on file   Occupational History    Not on file   Tobacco Use    Smoking status: Never Smoker    Smokeless tobacco: Never Used   Substance and Sexual Activity    Alcohol use: Yes     Comment: rare    Drug use: Never    Sexual activity: Yes     Partners: Male     Birth control/protection: None   Other Topics Concern    Not on file   Social History Narrative    Not on file     Social Determinants of Health     Financial Resource Strain:     Difficulty of Paying Living Expenses: Not on file   Food Insecurity:     Worried About Running Out of Food in the Last Year: Not on file    Andra of Food in the Last Year: Not on file   Transportation Needs:     Lack of Transportation (Medical): Not on file    Lack of Transportation (Non-Medical):  Not on file   Physical Activity:     Days of Exercise per Week: Not on file    Minutes of Exercise per Session: Not on file   Stress:     Feeling of Stress : Not on file   Social Connections:     Frequency of Communication with Friends and Family: Not on file    Frequency of Social Gatherings with Friends and Family: Not on file    Attends [employer] Services: Not on file    Active Member of [address] [place] [address] or Organizations: Not on file    Attends Club or Organization Meetings: Not on file    Marital Status: Not on file   Intimate Partner Violence:     Fear of Current or Ex-Partner: Not on file    Emotionally Abused: Not on file    Physically Abused: Not on file    Sexually Abused: Not on file   Housing Stability:     Unable to Pay for Housing in the Last Year: Not on file    Number of [place] in the Last Year: Not on file    Unstable Housing in the Last Year: Not on file    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 Wt Readings from Last 3 Encounters:   02/04/22 111.6 kg (246 lb)   01/25/22 111.9 kg (246 lb 12.8 oz)   12/08/21 114.3 kg (252 lb)     Temp Readings from Last 3 Encounters:   01/25/22 97.4 °F (36.3 °C) (Temporal)   12/08/21 97.7 °F (36.5 °C) (Temporal)   06/30/21 98.6 °F (37 °C)     BP Readings from Last 3 Encounters:   02/04/22 138/88   01/25/22 (!) 160/95   12/08/21 (!) 145/90     Pulse Readings from Last 3 Encounters:   02/04/22 76   01/25/22 64   12/08/21 83       12/27/18   ECHO ADULT COMPLETE 12/27/2018 12/27/2018    Narrative · Estimated left ventricular ejection fraction is 56 - 60%. Visually   measured ejection fraction. Normal left ventricular wall motion, no   regional wall motion abnormality noted. · There is no evidence of pulmonary hypertension.         Signed by: Kal Junior DO       Physical Exam:    Visit Vitals  /88 (BP 1 Location: Left upper arm, BP Patient Position: Sitting, BP Cuff Size: Adult long)   Pulse 76   Ht 5' 8\" (1.727 m)   Wt 111.6 kg (246 lb)   SpO2 98%   BMI 37.40 kg/m²      Physical Exam  HENT:      Head: Normocephalic and atraumatic. Eyes:      Pupils: Pupils are equal, round, and reactive to light. Cardiovascular:      Rate and Rhythm: Normal rate and regular rhythm. Heart sounds: Normal heart sounds. No murmur heard. No friction rub. No gallop. Pulmonary:      Effort: Pulmonary effort is normal. No respiratory distress. Breath sounds: Normal breath sounds. No wheezing or rales. Chest:      Chest wall: No tenderness. Abdominal:      General: Bowel sounds are normal.      Palpations: Abdomen is soft. Musculoskeletal:         General: No tenderness. Skin:     General: Skin is warm and dry. Neurological:      Mental Status: She is alert and oriented to person, place, and time.          EKG today shows: NSR, normal axis and intervals, no ST segment abnormalities    Lab Results   Component Value Date/Time    Cholesterol, total 186 11/30/2021 12:00 AM    HDL Cholesterol 40 11/30/2021 12:00 AM    LDL, calculated 114 (H) 11/30/2021 12:00 AM    LDL, calculated 109.8 (H) 08/21/2019 11:52 AM    VLDL, calculated 32 11/30/2021 12:00 AM    VLDL, calculated 18.2 08/21/2019 11:52 AM    Triglyceride 181 (H) 11/30/2021 12:00 AM    CHOL/HDL Ratio 4.2 08/21/2019 11:52 AM     Impression and Plan:  Carlos Hassan is a 42 y.o. with:    1.) HTN, avg SBP goal <140  2.) Normal stress echo 2018  3.) Migraines, greatly improved after eliminating soy  4.) +FH premature ASCVD  5.) H/o thoracic outlet, known  6.) Atypical CP, likely noncardiac    1.) Norvasc made her swell, tolerating Metoprolol (1 month) but says makes her itch  2.) BP controlled  3.) RTC yearly, gave info for CAC as shes been concerned about her future risk and fam hx    Thank you for allowing me to participate in the care of your patient, please do not hesitate to call with questions or concerns.     [facility],    Pamela Sheikhate, DO

## 2022-02-04 NOTE — PROGRESS NOTES
Sivan Miranda presents today for   Chief Complaint   Patient presents with    Follow-up     6 month    Chest Pain     sharp and pressure     Palpitations     heart racing        Sivan Miranda preferred language for health care discussion is english/other. Is someone accompanying this pt? no    Is the patient using any DME equipment during [address]? no    Depression Screening:  3 most recent PHQ Screens 2/4/2022   Little interest or pleasure in doing things Not at all   Feeling down, depressed, irritable, or hopeless Not at all   Total Score PHQ 2 0       Learning Assessment:  Learning Assessment 2/4/2022   PRIMARY LEARNER Patient   HIGHEST LEVEL OF EDUCATION - PRIMARY LEARNER  -   BARRIERS PRIMARY LEARNER -   CO-LEARNER CAREGIVER -   PRIMARY LANGUAGE ENGLISH   LEARNER PREFERENCE PRIMARY DEMONSTRATION   ANSWERED BY patient   RELATIONSHIP SELF       Abuse Screening:  Abuse Screening Questionnaire 2/4/2022   Do you ever feel afraid of your partner? N   Are you in a relationship with someone who physically or mentally threatens you? N   Is it safe for you to go home? Y       Fall Risk  No flowsheet data found. Pt currently taking Anticoagulant therapy? no    Pt currently taking Antiplatelet therapy ? no      Coordination of Care:  1. Have you been to the ER, urgent care clinic since your last visit? Hospitalized since your last visit? no    2. Have you seen or consulted any other health care providers outside of the [address] [place] [address] since your last visit? Include any pap smears or colon screening.  no

## 2022-02-07 NOTE — PROGRESS NOTES
Left message for patient to call the office. Re:   Please let her know that she has left hip osteoarthritis but no other abnormalities on her pelvis CT. If she is interested in seeing Orthopedics, please place a referral for me to sign.

## 2022-02-07 NOTE — PROGRESS NOTES
Please let her know that she has left hip osteoarthritis but no other abnormalities on her pelvis CT. If she is interested in seeing Orthopedics, please place a referral for me to sign.      Dr. Dyan Cavazos  [facility] [address], [address] [place] [address] Str.  Phone: [phone]  Fax: [phone]

## 2022-02-08 ENCOUNTER — TELEPHONE (OUTPATIENT)
Dept: INTERNAL MEDICINE CLINIC | Age: 42
End: 2022-02-08

## 2022-02-08 DIAGNOSIS — M16.12 OSTEOARTHRITIS OF LEFT HIP, UNSPECIFIED OSTEOARTHRITIS TYPE: Primary | ICD-10-CM

## 2022-02-09 NOTE — TELEPHONE ENCOUNTER
Referral order to Orthopedics placed.     Dr. Jone Teixeira  [facility], [facility], [address]  Phone: [phone]  Fax: [phone]

## 2022-02-16 ENCOUNTER — OFFICE VISIT (OUTPATIENT)
Dept: ORTHOPEDIC SURGERY | Age: 42
End: 2022-02-16
Payer: COMMERCIAL

## 2022-02-16 VITALS
HEIGHT: 68 IN | HEART RATE: 80 BPM | WEIGHT: 246 LBS | TEMPERATURE: 98 F | BODY MASS INDEX: 37.28 KG/M2 | OXYGEN SATURATION: 100 %

## 2022-02-16 DIAGNOSIS — G54.0 THORACIC OUTLET SYNDROME: ICD-10-CM

## 2022-02-16 DIAGNOSIS — M25.859 FEMOROACETABULAR IMPINGEMENT: ICD-10-CM

## 2022-02-16 DIAGNOSIS — M25.552 HIP PAIN, LEFT: Primary | ICD-10-CM

## 2022-02-16 DIAGNOSIS — M16.12 PRIMARY OSTEOARTHRITIS OF LEFT HIP: ICD-10-CM

## 2022-02-16 PROCEDURE — 99203 OFFICE O/P NEW LOW 30 MIN: CPT | Performed by: SPECIALIST

## 2022-02-16 PROCEDURE — 73502 X-RAY EXAM HIP UNI 2-3 VIEWS: CPT | Performed by: SPECIALIST
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
## 2022-02-23 ENCOUNTER — HOSPITAL ENCOUNTER (OUTPATIENT)
Dept: PHYSICAL THERAPY | Age: 42
Discharge: HOME | End: 2022-02-23
Payer: COMMERCIAL

## 2022-02-23 PROCEDURE — 97163 PT EVAL HIGH COMPLEX 45 MIN: CPT

## 2022-02-23 PROCEDURE — 97530 THERAPEUTIC ACTIVITIES: CPT

## 2022-02-23 PROCEDURE — 97112 NEUROMUSCULAR REEDUCATION: CPT

## 2022-02-23 NOTE — PROGRESS NOTES
PT DAILY TREATMENT NOTE/HIP [id number]    Patient Name: Deyvi Swann  Date:2022  : 1980  [x]  Patient  Verified  Payor: BLUE CROSS / Plan: [address] [place] [address] / Product Type: PPO /    In time:1220  Out time:115  Total Treatment Time (min): 55  Visit #: 1 of 12    Medicare/BCBS Only   Total Timed Codes (min):  35 1:1 Treatment Time:  55     Treatment Area: Left hip pain [M25.552]    SUBJECTIVE  Pain Level (0-10 scale): 0  []constant []intermittent []improving []worsening []no change since onset    Any medication changes, allergies to medications, adverse drug reactions, diagnosis change, or new procedure performed?: [x] No    [] Yes (see summary sheet for update)  Subjective functional status/changes: Currently no left hip pain however reports soreness/achy pain left hip with prolonged walking and intermittent sharp pain radiating down anterior thigh with certain movements, getting  in/out of car. Achy pain also with prolonged left S/L. + nocturnal pain. Also has sleep apnea-waiting for machine. Frequent urination due to multiple uterus/bladder surgeries. Hx of chronic LBP, neck pain , neck shoulder pain left , s/p surgical removal of left first rib  due to TOS resulting in multiple complications (collapsed lung, anaphylactic shock ). S/p hysterectomy . Onset of left hip pain after fall in [place] 2020 resulting in right ankle fracture/ligament damage- immobilization in boot for 6 months. During fall she fell onto left hip however sharp pain started as patient was immobilized in boot for 6 months . PLOF: Full function prior to fall and ankle immobilization.    Limitations to PLOF: prolonged walking, no hiking  Mechanism of Injury: fall during hike  Current symptoms/Complaints: left hip pain  Previous Treatment/Compliance: PT for right ankle  PMHx/Surgical Hx: as above  Work Hx: shopping for customers  Living Situation: 2 level home  Pt Goals: better mobility left hip and be able to go hiking  Barriers: []pain []financial []time []transportation []other  Motivation: good  Substance use: []Alcohol []Tobacco []other:   FABQ Score: []low []elevate  Cognition: A & O x 3    Other:    OBJECTIVE/EXAMINATION  Domestic Life: WNL  Activity/Recreational Limitations: no current workout routine  Mobility: WNL  Self Care:  WNL            20 min [x]Eval                  []Re-Eval       25 min Therapeutic Activity:  [x]  See flow sheet : instruction in HEP, POC, education about alignment deficits , proper diaphragmatic breathing without use of neck musculature   Rationale: patient education  to improve the patients ability to actively participate in HEP     10 min Neuromuscular Re-education:  [x]  See flow sheet :focus on hamstring activation and LS ES inhibition   Rationale: increase ROM, increase strength, improve coordination and increase proprioception  to improve the patients ability to perform ADL with improved alignment            With   [x] TE   [] TA   [] neuro   [] other: Patient Education: [x] Review HEP    [] Progressed/Changed HEP based on:   [] positioning   [] body mechanics   [] transfers   [] heat/ice application    [] other:      Other Objective/Functional Measures: as per eval    Physical Therapy Evaluation- Hip    Posture:mild FHP, left shoulder elevated    Gait:  [] Normal    [x] Abnormal    [] Antalgic    [] NWB    Device:none    Describe:minimal trunk rotation, some arm swing         ROM/Strength        AROM                     PROM        Strength (1-5)  Hip Left Right Left Right Left Right   Flexion 125 pain WNL       Extension -10 WNL       Abduction     3+ 4-   Adduction         ER 32 seated 40       IR 28 35 Pain  4-/TFL 5   Knee Left Right Left Right Left Right   Extension         Flexion              Flexibility: [] Unable to assess at this time  Hamstrings:    (L) Tightness= [] WNL   [x] Min   [] Mod   [] Severe    (R) Tightness= [x] WNL   [] Min   [] Mod   [] Severe  Quadriceps:    (L) Tightness= [] WNL   [] Min   [] Mod   [] Severe    (R) Tightness= [] WNL   [] Min   [] Mod   [] Severe  Gastroc:    (L) Tightness= [] WNL   [] Min   [] Mod   [] Severe    (R) Tightness= [] WNL   [] Min   [] Mod   [] Severe                                  Palpation  [] Min  [x] Mod  [] Severe    Location:left lateral hip  [] Min  [x] Mod  [] Severe    Location: marked left neck shoulder tension/guarding  [] Min  [] Mod  [] Severe    Location:    Optional Tests  Burak/ Shavonne Test: [] Neg    [] Pos  Gaudencio Test:  [] Neg    [x] Pos left  Scouring Test: [] Neg    [] Pos  Trendelenberg:  [x] Neg    [] Pos [] Left    [] Right  OberTest:   [] Neg    [x] Pos  Ely's Test:  [] Neg    [] Pos  Piriformis Test:  [] Neg    [] Pos  Sub-talor alignment: [] Neurtral [] Pronation [] Supination  Forefoot alignment: [] Neutral [] Varus [] Valgus  Functional Leg Length (cm):   Right:  Left:  Discrepancy:    Other tests/ comments:ADT both +  Trunk rotation left 18 in, right 17.5 in  Standing reach test 7 in       Pain Level (0-10 scale) post treatment: 0    ASSESSMENT/Changes in Function: patient challenged with left hip Flex in combination with IR causing sharp pain. Presenting with bilateral ADT indicating PEC pattern. Weakness noted jhony in left hip ADD/ABD, shallow breathing pattern with compensatory use of neck musculature    Patient will continue to benefit from skilled PT services to address ROM deficits, address strength deficits, analyze and address soft tissue restrictions, analyze and cue movement patterns and assess and modify postural abnormalities to attain remaining goals.      [x]  See Plan of Care  []  See progress note/recertification  []  See Discharge Summary         Progress towards goals / Updated goals:  Good understanding of POC and initial HEP    PLAN  []  Upgrade activities as tolerated     [x]  Continue plan of care  []  Update interventions per flow sheet       []  Discharge due to:_  []  Other:_      Bonita Matson, PT 2/23/2022  12:25 PM

## 2022-02-23 NOTE — PROGRESS NOTES
[facility] at the [address], [place], [address] [facility]  Phone: [phone]      Fax:  [phone]             Plan of Care/ Statement of Necessity for Physical Therapy Services      Patient name: Carlos Hassan Start of Care: 2022   Referral source: Feng Blankenship MD : 1980    Medical Diagnosis: Left hip pain [M25.552]   Onset Date:2020    Treatment Diagnosis: left hip pain   Prior Hospitalization: see medical history Provider#: [id number]   Medications: Verified on Patient summary List    Comorbidities: s/p right ankle sprain, chronic LBP/CS pain left, sleep apnea, s/p removal of left first rib   Prior Level of Function: Full function without hip pain prior to fall/ankle sprain in       The Plan of Care and following information is based on the information from the initial evaluation. Assessment/ key information: 42 YOF presenting to PT with c/o left hip pain which started after a fall /ankle sprain in 2020. Patient reports progression of pain after being immobilized in boot for extended period with dull achy pain triggered by left sidelying position / prolonged walking and sharp anterior groin /thigh pain with certain movements. Overall pain is ranging 0-7/10. Objective findings include limited left hip ROM with associated pain during end range IR/Flexion, strength deficit core/left hip, altered postural alignment with increased LS hyperlordosis, rib cage stiffness and abnormal breathing pattern. Positive left Gaudencio test. Symptoms are consistent with mechanical hip pain due to prolonged faulty alignment throughout lumbo-pelvic region. Patient is a good candidate for skilled PT to address functional deficits.     Evaluation Complexity History HIGH Complexity :3+ comorbidities / personal factors will impact the outcome/ POC ; Examination HIGH Complexity : 4+ Standardized tests and measures addressing body structure, function, activity limitation and / or participation in recreation  ;Presentation HIGH Complexity : Unstable and unpredictable characteristics  ; Clinical Decision Making MEDIUM Complexity : FOTO score of 26-74 FOTO score = an established functional score where 100 = no disability  Overall Complexity Rating: HIGH   Problem List: pain affecting function, decrease ROM, decrease strength and decrease flexibility/ joint mobility   Treatment Plan may include any combination of the following: Therapeutic exercise, Therapeutic activities, Neuromuscular re-education, Physical agent/modality, Gait/balance training, Manual therapy and Patient education  Vasopnuematic compression justification:  Per bilateral girth measures taken and listed above the edema is considered significant and having an impact on the patient's strength, balance, gait, ADLs and ROM, alignment  Patient / Family readiness to learn indicated by: trying to perform skills and interest  Persons(s) to be included in education: patient (P)  Barriers to Learning/Limitations: None  Patient Goal (s): better mobility left hip and be able to go hiking  Patient Self Reported Health Status: good  Rehabilitation Potential: good    Short Term Goals: To be accomplished in 3 weeks:   1. Patient is compliant with daily HEP for prophylaxis and progress towards all goals  Status at Eval: HEP initiated    2. Patient demonstrates negative Adduction drop test for both hips indicating improvement in lumbo-pelvic alignment and femoral acetabular mobility needed for gait  Status at Eval: bilateral positive ADT    3. Improved trunk rotation to <or= 15 in bilaterally without indicating mobility needed to perform all ADL and ambulation  Status at Eval: trunk rotation limited to right 18 in, left 17.5 in, rib cage stiffness, shallow breathing pattern      Long Term Goals: To be accomplished in 6 weeks:   1.  Improved FOTO score to >or= 68/100 indicating improvement in overall function including increased tolerance to left S/L at night, increased ease getting in/out of car and for prolonged walking during ADL  Status at Eval: 57/100, pain with above activities    2. Patient demonstrates ability to perform YARON squat test >or= 3/5 with good postural alignment and ability to bring thighs to parallel position with floor indicating mobility and stability needed for ADL  Status at Eval: Squat test 2/5, limited range with testing , fwd WS with LS hyperlordosis    3. Patient demonstrates negative Gaudencio test and IR >or= 30 deg for left hip without pain at end range for functional mobility with ADL  Status at Eval: sharp pain with end range left hip IR in supine, + left Gaudencio test    4. Patient demonstrates ability to perform bridging x10 with good height and alternating leg kicks with good pelvic stability indicating functional hip strength  Status at Eval: challenged with full height bridging, lacking pelvic stability, strength deficit in hamstrings, gluts, ADD/ABD    Frequency / Duration: Patient to be seen 2 times per week for 6 weeks. Patient/ Caregiver education and instruction: Diagnosis, prognosis, exercises   [x]  Plan of care has been reviewed with PTA    Certification Period: n/a  Rivera Wynne, PT 2/23/2022 2:01 PM  _____________________________________________________________________  I certify that the above Therapy Services are being furnished while the patient is under my care. I agree with the treatment plan and certify that this therapy is necessary.     Physician's Signature:____________Date:_________TIME:________                                      Layla Grey MD    ** Signature, Date and Time must be completed for valid certification **    Please sign and return to [facility] at the [address], [address] [facility]       Phone: [phone]      Fax:  [phone]

## 2022-02-25 ENCOUNTER — HOSPITAL ENCOUNTER (OUTPATIENT)
Dept: PHYSICAL THERAPY | Age: 42
Discharge: HOME | End: 2022-02-25
Payer: COMMERCIAL

## 2022-02-25 PROCEDURE — 97112 NEUROMUSCULAR REEDUCATION: CPT

## 2022-02-25 PROCEDURE — 97110 THERAPEUTIC EXERCISES: CPT

## 2022-02-25 PROCEDURE — 97530 THERAPEUTIC ACTIVITIES: CPT

## 2022-02-25 NOTE — PROGRESS NOTES
PT DAILY TREATMENT NOTE    Patient Name: Becka Henderson  Date:2022  : 1980  [x]  Patient  Verified  Payor: BLUE CROSS / Plan: [address] [place] [address] Avenue / Product Type: PPO /    In time:12;15pm  Out time:1:01pm  Total Treatment Time (min): 46  Total Timed Codes (min): 46  1:1 Treatment Time (MC/BCBS only): 46   Visit #: 2 of 12    Treatment Dx: Left hip pain [M25.552]    SUBJECTIVE  Pain Level (0-10 scale): 2  Any medication changes, allergies to medications, adverse drug reactions, diagnosis change, or new procedure performed?: [x] No    [] Yes (see summary sheet for update)  Subjective functional status/changes:   [] No changes reported  The pt reported feeling alright after IE    Pt reported having a mild TBI in . Also reported that when she had a rib removal in  she had a left lung collapse with a case of pneumonia.  Updated medical history     OBJECTIVE    21 min Therapeutic Exercise:  [x] See flow sheet :   Rationale: increase ROM and increase strength to improve the patients ability to return to PLOF    8 min Therapeutic Activity:  [x]  See flow sheet : pt education of delayed onset muscle soreness    Rationale: increase ROM, increase strength and improve coordination  to improve the patients ability to perform daily activities with decreased pain and symptom levels     17 min Neuromuscular Re-education:  [x]  See flow sheet :   Rationale: increase ROM, increase strength, improve coordination, improve balance and increase proprioception  to improve the patients ability to perform daily activities with decreased pain and symptom levels    With   [] TE   [] TA   [] neuro   [] other: Patient Education: [x] Review HEP    [] Progressed/Changed HEP based on:   [] positioning   [] body mechanics   [] transfers   [] heat/ice application    [] other:      Other Objective/Functional Measures: updated and reviewed HEP - pt reported understanding      Pain Level (0-10 scale) post treatment: 2    ASSESSMENT/Changes in Function: The pt reported to therapy with a pleasant attitude and ready to participate in therapeutic exercises. Patient tolerated treatment session well today. Patient had no complaints with addition of all new exercises per flow sheet to exercise program to accomplish increase lumbo-pelvic stability. Pt required multiple tactile cues, verbal cues, and demo for all new YARON balloon and 90-90 position exercises to complete them with proper body mechanics. Pt was challenged with these exercises due to limited rib mobility. Patient continues to make slow progress toward goals and would benefit from continued skilled PT intervention to address remaining deficits outlined in goals below. Patient will continue to benefit from skilled PT services to modify and progress therapeutic interventions, address functional mobility deficits, address ROM deficits, address strength deficits, analyze and address soft tissue restrictions, analyze and cue movement patterns, analyze and modify body mechanics/ergonomics, assess and modify postural abnormalities and address imbalance/dizziness to attain remaining goals. [x]  See Plan of Care  []  See progress note/recertification  []  See Discharge Summary         Progress towards goals / Updated goals:  Short Term Goals: To be accomplished in 3 weeks:              1. Patient is compliant with daily HEP for prophylaxis and progress towards all goals  Status at Eval: HEP initiated     2.  Patient demonstrates negative Adduction drop test for both hips indicating improvement in lumbo-pelvic alignment and femoral acetabular mobility needed for gait  Status at Eval: bilateral positive ADT     3. Improved trunk rotation to <or= 15 in bilaterally without indicating mobility needed to perform all ADL and ambulation  Status at Eval: trunk rotation limited to right 18 in, left 17.5 in, rib cage stiffness, shallow breathing pattern        Long Term Goals: To be accomplished in 6 weeks:              1. Improved FOTO score to >or= 68/100 indicating improvement in overall function including increased tolerance to left S/L at night, increased ease getting in/out of car and for prolonged walking during ADL  Status at Eval: 57/100, pain with above activities     2. Patient demonstrates ability to perform YARON squat test >or= 3/5 with good postural alignment and ability to bring thighs to parallel position with floor indicating mobility and stability needed for ADL  Status at Eval: Squat test 2/5, limited range with testing , fwd WS with LS hyperlordosis     3. Patient demonstrates negative Gaudencio test and IR >or= 30 deg for left hip without pain at end range for functional mobility with ADL  Status at Eval: sharp pain with end range left hip IR in supine, + left Gaudencio test     4. Patient demonstrates ability to perform bridging x10 with good height and alternating leg kicks with good pelvic stability indicating functional hip strength  Status at Eval: challenged with full height bridging, lacking pelvic stability, strength deficit in hamstrings, gluts, ADD/ABD    PLAN  []  Upgrade activities as tolerated     [x]  Continue plan of care  []  Update interventions per flow sheet       []  Discharge due to:_  []  Other:_      Alfredo Patterson, PT 2/25/2022  12:20 PM    Future Appointments   Date Time Provider [place]   3/3/2022 11:30 AM Shakir Counter, PT [facility]   3/4/2022  8:30 AM Remesic, Feliberto Jeans [facility]   3/8/2022 12:45 PM Shakir Counter, PT [facility]   3/11/2022 12:15 PM Shakir Counter, PT [facility]   3/14/2022 12:45 PM Roxine Knife, PT [facility]   3/18/2022 12:15 PM Remesic, Feliberto Jeans [facility]   3/21/2022 12:45 PM Roxine Knife, PT [facility]   3/25/2022 11:30 AM Shakir Counter, PT [facility]   3/28/2022  1:30 PM Lisbet Tanner PT [facility]   4/1/2022 11:30 AM RUDDY Crowe [facility]   5/25/2022 11:05 AM [facility] LAB VISIT [facility]   6/1/2022  8:40 AM Inna Nath MD [facility]   2/8/2023 12:40 PM Rajiv Rodas DO [facility]

## 2022-03-03 ENCOUNTER — HOSPITAL ENCOUNTER (OUTPATIENT)
Dept: PHYSICAL THERAPY | Age: 42
Discharge: HOME | End: 2022-03-03
Payer: COMMERCIAL

## 2022-03-03 PROCEDURE — 97140 MANUAL THERAPY 1/> REGIONS: CPT

## 2022-03-03 PROCEDURE — 97112 NEUROMUSCULAR REEDUCATION: CPT

## 2022-03-03 PROCEDURE — 97110 THERAPEUTIC EXERCISES: CPT

## 2022-03-03 NOTE — PROGRESS NOTES
PT DAILY TREATMENT NOTE    Patient Name: Queen Sera  Date:3/3/2022  : 1980  [x]  Patient  Verified  Payor: SEBAS VALENTINO / Plan: [address] [place] [address] Avenue / Product Type: PPO /    In time:11: 32  Out time:12;23pm  Total Treatment Time (min): 51  Total Timed Codes (min): 51  1:1 Treatment Time (MC/BCBS only): 45 ( 6 minutes with the tech)   Visit #: 3 of 12    Treatment Dx: Left hip pain [M25.552]    SUBJECTIVE  Pain Level (0-10 scale): 0  Any medication changes, allergies to medications, adverse drug reactions, diagnosis change, or new procedure performed?: [x] No    [] Yes (see summary sheet for update)  Subjective functional status/changes:   [] No changes reported  The pt reported feeling challenged last treatment session and minimal lingering soreness. OBJECTIVE    22 min Therapeutic Exercise:  [x] See flow sheet : (6 minutes with tech)    Rationale: increase ROM, increase strength and improve coordination to improve the patients ability to return to PLOF     21 min Neuromuscular Re-education:  [x]  See flow sheet :   Rationale: increase ROM, increase strength, improve coordination, improve balance and increase proprioception  to improve the patients ability to perform daily activities with decreased pain and symptom levels    8 min Manual Therapy: supine with LE elevated - rib mobilization bilaterally with pt consent with hand placement    Rationale: decrease pain, increase ROM, increase tissue extensibility and increase postural awareness to improve the patients ability to perform daily activities with decreased pain and symptom levels    The manual therapy interventions were performed at a separate and distinct time from the therapeutic activities interventions.           With   [] TE   [] TA   [] neuro   [] other: Patient Education: [x] Review HEP    [] Progressed/Changed HEP based on:   [] positioning   [] body mechanics   [] transfers   [] heat/ice application    [] other: Other Objective/Functional Measures: updated LTR goal      Pain Level (0-10 scale) post treatment: 0    ASSESSMENT/Changes in Function: The pt reported to therapy with a pleasant attitude and ready to participate in therapeutic exercises. Patient tolerated treatment session well today. Patient had no complaints with addition of standing left posterior capsule stretch, LAQ with reach, and LTR stretch to exercise program to accomplish increase lumbo-pelvic stability and mobility. Pt denied discomfort with gentle manual therapy. Patient continues to make good progress toward goals and would benefit from continued skilled PT intervention to address remaining deficits outlined in goals below. Patient will continue to benefit from skilled PT services to modify and progress therapeutic interventions, address functional mobility deficits, address ROM deficits, address strength deficits, analyze and address soft tissue restrictions, analyze and cue movement patterns, analyze and modify body mechanics/ergonomics, assess and modify postural abnormalities and address imbalance/dizziness to attain remaining goals. [x]  See Plan of Care  []  See progress note/recertification  []  See Discharge Summary         Progress towards goals / Updated goals:  Short Term Goals: To be accomplished in 3 weeks:              1. Patient is compliant with daily HEP for prophylaxis and progress towards all goals  Status at Eval: HEP initiated     2.  Patient demonstrates negative Adduction drop test for both hips indicating improvement in lumbo-pelvic alignment and femoral acetabular mobility needed for gait  Status at Eval: bilateral positive ADT     3. Improved trunk rotation to <or= 15 in bilaterally without indicating mobility needed to perform all ADL and ambulation  Status at Eval: trunk rotation limited to right 18 in, left 17.5 in, rib cage stiffness, shallow breathing pattern   Current 3/3/22: LTR: right = 17.5 inches, left = 17 inches - progressing         Long Term Goals: To be accomplished in 6 weeks:              1. Improved FOTO score to >or= 68/100 indicating improvement in overall function including increased tolerance to left S/L at night, increased ease getting in/out of car and for prolonged walking during ADL  Status at Eval: 57/100, pain with above activities     2. Patient demonstrates ability to perform YARON squat test >or= 3/5 with good postural alignment and ability to bring thighs to parallel position with floor indicating mobility and stability needed for ADL  Status at Eval: Squat test 2/5, limited range with testing , fwd WS with LS hyperlordosis     3. Patient demonstrates negative Gaudencio test and IR >or= 30 deg for left hip without pain at end range for functional mobility with ADL  Status at Eval: sharp pain with end range left hip IR in supine, + left Gaudencio test     4. Patient demonstrates ability to perform bridging x10 with good height and alternating leg kicks with good pelvic stability indicating functional hip strength  Status at Eval: challenged with full height bridging, lacking pelvic stability, strength deficit in hamstrings, gluts, ADD/ABD    PLAN  []  Upgrade activities as tolerated     [x]  Continue plan of care  []  Update interventions per flow sheet       []  Discharge due to:_  []  Other:_      Radha Aguilar PT 3/3/2022  11:39 AM    Future Appointments   Date Time Provider Finn Beth   3/4/2022  8:30 AM Remesic, Lennox Adler MI[facility]PTBW [facility]   3/8/2022 12:45 PM RUDDY Watkins THE [facility]   3/11/2022 12:15 PM [facility]   3/14/2022 12:45 PM RUDDY Loya THE [facility]   3/18/2022 12:15 PM Remesic, Lennox Adler MI[facility]PTBW THE [facility]   3/21/2022 12:45 PM RUDDY Loya THE [facility]   3/25/2022 11:30 AM RUDDY Watkins THE [facility]   3/28/2022  1:30 PM RUDDY Loya THE [facility]   4/1/2022 11:30 AM RUDDY Watkins THE [facility]   5/25/2022 11:05 AM [facility] LAB VISIT [facility] BS AMB   6/1/2022  8:40 AM Bertha Quinn MD [facility] BS AMB   2/8/2023 12:40 PM García Lang DO [facility] BS AMB

## 2022-03-04 ENCOUNTER — TELEPHONE (OUTPATIENT)
Dept: INTERNAL MEDICINE CLINIC | Age: 42
End: 2022-03-04

## 2022-03-04 ENCOUNTER — HOSPITAL ENCOUNTER (OUTPATIENT)
Dept: PHYSICAL THERAPY | Age: 42
Discharge: HOME | End: 2022-03-04
Payer: COMMERCIAL

## 2022-03-04 PROCEDURE — 97112 NEUROMUSCULAR REEDUCATION: CPT

## 2022-03-04 PROCEDURE — 97110 THERAPEUTIC EXERCISES: CPT

## 2022-03-04 PROCEDURE — 97530 THERAPEUTIC ACTIVITIES: CPT

## 2022-03-04 NOTE — PROGRESS NOTES
PT DAILY TREATMENT NOTE    Patient Name: Doris Goode  Date:3/4/2022  : 1980  [x]  Patient  Verified  Payor: Isabel Ortiz / Plan: [address] [place] [address] / Product Type: PPO /    In time:8:29  Out time:9:18  Total Treatment Time (min): 49  Total Timed Codes (min): 49  1:1 Treatment Time (MC/BCBS only): 40   Visit #: 4 of 12    Treatment Dx: Left hip pain [M25.552]    SUBJECTIVE  Pain Level (0-10 scale): 3  Any medication changes, allergies to medications, adverse drug reactions, diagnosis change, or new procedure performed?: [x] No    [] Yes (see summary sheet for update)  Subjective functional status/changes:   [] No changes reported  \"I twisted my right hip yesterday so its hurting and my left hip is still tight. \"     OBJECTIVE      24 min Therapeutic Exercise:  [x] See flow sheet :   Rationale: increase ROM and increase strength to improve the patients ability to perform daily activities with decreased pain and symptom levels      10 min Therapeutic Activity:  [x]?   See flow sheet :   Rationale: increase strength, improve coordination and increase proprioception  to improve the patients ability to perform daily activities with decreased pain and symptom levels       15 min Neuromuscular Re-education:  [x]  See flow sheet :   Rationale: increase strength, improve coordination, improve balance and increase proprioception  to improve the patients ability to perform daily activities with decreased pain and symptom levels          With   [] TE   [] TA   [] neuro   [] other: Patient Education: [x] Review HEP    [] Progressed/Changed HEP based on:   [] positioning   [] body mechanics   [] transfers   [] heat/ice application    [] other:      Other Objective/Functional Measures:   Tactile cues for PPT in s/l to recruit left glut med for add pull back  Decreased left hip pain with hip shift and lady in glasses with left add     Pain Level (0-10 scale) post treatment: 2    ASSESSMENT/Changes in Function: Pt tolerated session well with reporting decrease pain post session and feeling looser in left hip. Pt responding well to left posterior hip shift with exercises and increasing left add and glut med recruitment. Updated HEP to include standing left posterior hip capsule stretch. Patient will continue to benefit from skilled PT services to modify and progress therapeutic interventions, address functional mobility deficits, address ROM deficits, address strength deficits, analyze and address soft tissue restrictions, analyze and cue movement patterns, analyze and modify body mechanics/ergonomics, assess and modify postural abnormalities, address imbalance/dizziness and instruct in home and community integration to attain remaining goals. [x]  See Plan of Care  []  See progress note/recertification  []  See Discharge Summary         Progress towards goals / Updated goals:   Short Term Goals: To be accomplished in 3 weeks:              1. Patient is compliant with daily HEP for prophylaxis and progress towards all goals  Status at Eval: HEP initiated  Current: compliance, updated today progressing 3/4/22     2. Patient demonstrates negative Adduction drop test for both hips indicating improvement in lumbo-pelvic alignment and femoral acetabular mobility needed for gait  Status at Eval: bilateral positive ADT     3. Improved trunk rotation to <or= 15 in bilaterally without indicating mobility needed to perform all ADL and ambulation  Status at Eval: trunk rotation limited to right 18 in, left 17.5 in, rib cage stiffness, shallow breathing pattern   Current 3/3/22: LTR: right = 17.5 inches, left = 17 inches - progressing         Long Term Goals: To be accomplished in 6 weeks:              1.  Improved FOTO score to >or= 68/100 indicating improvement in overall function including increased tolerance to left S/L at night, increased ease getting in/out of car and for prolonged walking during ADL  Status at Eval: 57/100, pain with above activities     2. Patient demonstrates ability to perform YARON squat test >or= 3/5 with good postural alignment and ability to bring thighs to parallel position with floor indicating mobility and stability needed for ADL  Status at Eval: Squat test 2/5, limited range with testing , fwd WS with LS hyperlordosis     3. Patient demonstrates negative Gaudencio test and IR >or= 30 deg for left hip without pain at end range for functional mobility with ADL  Status at Eval: sharp pain with end range left hip IR in supine, + left Gaudencio test     4. Patient demonstrates ability to perform bridging x10 with good height and alternating leg kicks with good pelvic stability indicating functional hip strength  Status at Eval: challenged with full height bridging, lacking pelvic stability, strength deficit in hamstrings, gluts, ADD/ABD       PLAN  []  Upgrade activities as tolerated     [x]  Continue plan of care  []  Update interventions per flow sheet       []  Discharge due to:_  []  Other:_      Rodolfo Tesfaye 3/4/2022  8:06 AM    Future Appointments   Date Time Provider Finn Beth   3/4/2022  8:30 AM Elver Franco [facility]   3/8/2022 12:45 PM Anna Marie Lux PT [facility]   3/11/2022 12:15 PM Anna Marie Lux PT [facility]   3/14/2022 12:45 PM Krishna Inman PT [facility]   3/18/2022 12:15 PM Elver Franco [facility]   3/21/2022 12:45 PM Krishna Inman PT [facility]   3/25/2022 11:30 AM Anna Marie Lux PT [facility]   3/28/2022  1:30 PM Krishna Inman PT [facility]   4/1/2022 11:30 AM Anna Marie Lux PT [facility]   5/25/2022 11:05 AM [facility] LAB VISIT [facility] AM[facility]   6/1/2022  8:40 AM Ellis Arguello MD [facility] BS AMB   2/8/2023 12:40 PM Jana Kebede DO [facility] BS AMB

## 2022-03-04 NOTE — TELEPHONE ENCOUNTER
Pt calling re her body shakes a lot / trembling. Stated Dr La Caraballo referred her to OT and OT referred her to PT    Stated the PT therapist advised her to check with her doctor due to trembling.  Stated it did not seem normal.

## 2022-03-06 DIAGNOSIS — G43.809 OTHER MIGRAINE WITHOUT STATUS MIGRAINOSUS, NOT INTRACTABLE: ICD-10-CM

## 2022-03-07 RX ORDER — RIMEGEPANT SULFATE 75 MG/75MG
TABLET, ORALLY DISINTEGRATING ORAL
Qty: 8 TABLET | Refills: 0 | Status: SHIPPED | OUTPATIENT
Start: 2022-03-07 | End: 2022-04-11

## 2022-03-08 ENCOUNTER — HOSPITAL ENCOUNTER (OUTPATIENT)
Dept: PHYSICAL THERAPY | Age: 42
Discharge: HOME | End: 2022-03-08
Payer: COMMERCIAL

## 2022-03-08 PROCEDURE — 97112 NEUROMUSCULAR REEDUCATION: CPT

## 2022-03-08 PROCEDURE — 97530 THERAPEUTIC ACTIVITIES: CPT

## 2022-03-08 PROCEDURE — 97110 THERAPEUTIC EXERCISES: CPT

## 2022-03-08 PROCEDURE — 97140 MANUAL THERAPY 1/> REGIONS: CPT

## 2022-03-08 NOTE — PROGRESS NOTES
PT DAILY TREATMENT NOTE    Patient Name: Rafael Hall  Date:3/8/2022  : 1980  [x]  Patient  Verified  Payor: BLUE CROSS / Plan: [address] [place] [address] Avenue / Product Type: PPO /    In time:12:45pm  Out time:1:38pm  Total Treatment Time (min): 53  Total Timed Codes (min): 53  1:1 Treatment Time (MC/BCBS only): 53   Visit #: 5 of 12    Treatment Dx: Left hip pain [M25.552]    SUBJECTIVE  Pain Level (0-10 scale): 2  Any medication changes, allergies to medications, adverse drug reactions, diagnosis change, or new procedure performed?: [x] No    [] Yes (see summary sheet for update)  Subjective functional status/changes:   [] No changes reported  The pt reported feeling tired today due a poor night sleep. The pt reports that she hopes to follow up with her MD about her recent lab results in 2021 due to occasional shakes.     OBJECTIVE  21 min Therapeutic Exercise:  [x] See flow sheet :   Rationale: increase ROM, increase strength and improve coordination to improve the patients ability to return to PLOF    9 min Therapeutic Activity:  [x]  See flow sheet : PT completed FOTO with the pt for best understanding of the questions    Rationale: increase ROM, increase strength and improve coordination  to improve the patients ability to perform daily activities with decreased pain and symptom levels     15 min Neuromuscular Re-education:  [x]  See flow sheet :   Rationale: increase ROM, increase strength, improve coordination, improve balance and increase proprioception  to improve the patients ability to perform daily activities with decreased pain and symptom levels    8 min Manual Therapy:  supine with LE elevated - rib mobilization bilaterally with pt consent with hand placement   Rationale: decrease pain, increase ROM, increase tissue extensibility and increase postural awareness to improve the patients ability to perform daily activities with decreased pain and symptom levels  The manual therapy interventions were performed at a separate and distinct time from the therapeutic activities interventions. With   [] TE   [] TA   [] neuro   [] other: Patient Education: [x] Review HEP    [] Progressed/Changed HEP based on:   [] positioning   [] body mechanics   [] transfers   [] heat/ice application    [] other:      Other Objective/Functional Measures: updated FOTO goal      Pain Level (0-10 scale) post treatment: 2    ASSESSMENT/Changes in Function: The pt reported to therapy with a pleasant attitude and ready to participate in therapeutic exercises. Patient tolerated treatment session well today. Patient had no complaints with addition of YARON S/L glut med and bosu step up/cross to exercise program to accomplish an increase in LE functional strength . Pt reports benefit from manual therapy above. Patient continues to make good progress toward goals and would benefit from continued skilled PT intervention to address remaining deficits outlined in goals below. Patient will continue to benefit from skilled PT services to modify and progress therapeutic interventions, address functional mobility deficits, address ROM deficits, address strength deficits, analyze and address soft tissue restrictions, analyze and cue movement patterns, analyze and modify body mechanics/ergonomics, assess and modify postural abnormalities and address imbalance/dizziness to attain remaining goals. [x]  See Plan of Care  []  See progress note/recertification  []  See Discharge Summary         Progress towards goals / Updated goals:  Short Term Goals: To be accomplished in 3 weeks:              1. Patient is compliant with daily HEP for prophylaxis and progress towards all goals  Status at al: HEP initiated  Current: compliance, updated today progressing 3/4/22     2.  Patient demonstrates negative Adduction drop test for both hips indicating improvement in lumbo-pelvic alignment and femoral acetabular mobility needed for gait  Status at Eval: bilateral positive ADT     3. Improved trunk rotation to <or= 15 in bilaterally without indicating mobility needed to perform all ADL and ambulation  Status at Eval: trunk rotation limited to right 18 in, left 17.5 in, rib cage stiffness, shallow breathing pattern   Current 3/3/22: LTR: right = 17.5 inches, left = 17 inches - progressing         Long Term Goals: To be accomplished in 6 weeks:              1. Improved FOTO score to >or= 68/100 indicating improvement in overall function including increased tolerance to left S/L at night, increased ease getting in/out of car and for prolonged walking during ADL  Status at Eval: 57/100, pain with above activities  Current 3/8/22: 66 - progressing      2. Patient demonstrates ability to perform YARON squat test >or= 3/5 with good postural alignment and ability to bring thighs to parallel position with floor indicating mobility and stability needed for ADL  Status at Eval: Squat test 2/5, limited range with testing , fwd WS with LS hyperlordosis     3. Patient demonstrates negative Gaudencio test and IR >or= 30 deg for left hip without pain at end range for functional mobility with ADL  Status at Eval: sharp pain with end range left hip IR in supine, + left Gaudencio test     4. Patient demonstrates ability to perform bridging x10 with good height and alternating leg kicks with good pelvic stability indicating functional hip strength  Status at Eval: challenged with full height bridging, lacking pelvic stability, strength deficit in hamstrings, gluts, ADD/ABD    PLAN  []  Upgrade activities as tolerated     [x]  Continue plan of care  []  Update interventions per flow sheet       []  Discharge due to:_  []  Other:_      Haily Andrews, PT 3/8/2022  12:48 PM    Future Appointments   Date Time Provider Finn Beth   3/11/2022 12:15 PM Darian Ceballos, PT [facility]   3/14/2022 12:45 PM Mackenzie Martinez, PT [facility]   3/18/2022 12:15 PM Lucy Shepherd [facility]   3/21/2022 12:45 PM Shawna Marmolejo, PT [facility]   3/25/2022 11:30 AM Luis Lux, PT [facility]   3/28/2022  1:30 PM Shawna Marmolejo, PT [facility]   4/1/2022 11:30 AM Luis Lux, PT [facility]   5/25/2022 11:05 AM [facility] LAB VISIT [facility] BS AMB   6/1/2022  8:40 AM Aguilar Reich MD [facility] BS AMB   2/8/2023 12:40 PM Hali Almeida DO [facility] BS AMB

## 2022-03-10 NOTE — TELEPHONE ENCOUNTER
Ok to offer her an apt at 1:30pm on 3/17/22.      Dr. Charisse Etienne  [facility] [address], [address] [place] [address] Str.  Phone: [phone]  Fax: [phone]

## 2022-03-11 ENCOUNTER — APPOINTMENT (OUTPATIENT)
Dept: PHYSICAL THERAPY | Age: 42
End: 2022-03-11
Payer: COMMERCIAL

## 2022-03-14 ENCOUNTER — HOSPITAL ENCOUNTER (OUTPATIENT)
Dept: PHYSICAL THERAPY | Age: 42
Discharge: HOME | End: 2022-03-14
Payer: COMMERCIAL

## 2022-03-14 PROCEDURE — 97112 NEUROMUSCULAR REEDUCATION: CPT

## 2022-03-14 PROCEDURE — 97110 THERAPEUTIC EXERCISES: CPT

## 2022-03-14 NOTE — PROGRESS NOTES
PT DAILY TREATMENT NOTE    Patient Name: Doris Goode  Date:3/14/2022  : 1980  [x]  Patient  Verified  Payor: BLUE CROSS / Plan: [address] [place] [address] / Product Type: PPO /    In time:1245  Out time:132  Total Treatment Time (min): 47  Total Timed Codes (min): 47  1:1 Treatment Time (MC/BCBS only): 52   Visit #: 6 of 12    Treatment Dx: Left hip pain [M25.552]    SUBJECTIVE  Pain Level (0-10 scale): 0  Any medication changes, allergies to medications, adverse drug reactions, diagnosis change, or new procedure performed?: [x] No    [] Yes (see summary sheet for update)  Subjective functional status/changes:   [] No changes reported  Reports overall improvement in left hip pain. Recent episode of sickness including sore throat, right ear ache, dizziness however MD ruled out infection. Reports right ear drum rupture due to sneezing. Reports feeling very tight.  Reports noticing of body shaking and is seeing her PCP for it on 3/17/22    OBJECTIVE          12 min Therapeutic Exercise:  [x] See flow sheet :   Rationale: increase ROM, increase strength, improve coordination, improve balance and increase proprioception to improve the patients ability to perform ADL       35 min Neuromuscular Re-education:  [x]  See flow sheet :   Rationale: increase ROM, increase strength, improve coordination, increase proprioception and pelvic repositioning  to improve the patients ability to perform ADL    With   [x] TE   [] TA   [] neuro   [] other: Patient Education: [x] Review HEP    [] Progressed/Changed HEP based on:   [] positioning   [] body mechanics   [] transfers   [] heat/ice application    [] other:      Other Objective/Functional Measures:   Trunk rotation right 16.5 in, left 17 in  ADT both +     Pain Level (0-10 scale) post treatment: 0    ASSESSMENT/Changes in Function: good tolerance to all activities without reports of hip pain,post session ADT to midline, patient challenged with knee hugs Patient will continue to benefit from skilled PT services to address ROM deficits, address strength deficits, analyze and address soft tissue restrictions and analyze and cue movement patterns to attain remaining goals. [x]  See Plan of Care  []  See progress note/recertification  []  See Discharge Summary         Progress towards goals / Updated goals:  Short Term Goals: To be accomplished in 3 weeks:              1. Patient is compliant with daily HEP for prophylaxis and progress towards all goals  Status at Eval: HEP initiated  Current: compliance, updated today progressing 3/4/22     2. Patient demonstrates negative Adduction drop test for both hips indicating improvement in lumbo-pelvic alignment and femoral acetabular mobility needed for gait  Status at Eval: bilateral positive ADT  Current status 3/14/22: ADT to midline post session, progressing     3. Improved trunk rotation to <or= 15 in bilaterally without indicating mobility needed to perform all ADL and ambulation  Status at Eval: trunk rotation limited to right 18 in, left 17.5 in, rib cage stiffness, shallow breathing pattern   Current 3/3/22: LTR: right = 17.5 inches, left = 17 inches - progressing           Long Term Goals: To be accomplished in 6 weeks:              1. Improved FOTO score to >or= 68/100 indicating improvement in overall function including increased tolerance to left S/L at night, increased ease getting in/out of car and for prolonged walking during ADL  Status at Eval: 57/100, pain with above activities  Current 3/8/22: 66 - progressing      2. Patient demonstrates ability to perform YARON squat test >or= 3/5 with good postural alignment and ability to bring thighs to parallel position with floor indicating mobility and stability needed for ADL  Status at Eval: Squat test 2/5, limited range with testing , fwd WS with LS hyperlordosis     3.  Patient demonstrates negative Gaduencio test and IR >or= 30 deg for left hip without pain at end range for functional mobility with ADL  Status at Eval: sharp pain with end range left hip IR in supine, + left Gaudencio test  Current status 3/14/22: left hip IR 30 deg without pain, goal partially met     4. Patient demonstrates ability to perform bridging x10 with good height and alternating leg kicks with good pelvic stability indicating functional hip strength  Status at Eval: challenged with full height bridging, lacking pelvic stability, strength deficit in hamstrings, gluts, ADD/ABD  Current status 3/14/22: demonstrates ability to perform partial height bridging without LBP, progressing       PLAN  []  Upgrade activities as tolerated     [x]  Continue plan of care  []  Update interventions per flow sheet       []  Discharge due to:_  []  Other:_      Catherine Montejo PT 3/14/2022  12:39 PM    Future Appointments   Date Time Provider Finn Beth   3/14/2022 12:45 PM Sue Ca, PT [facility]   3/17/2022  1:30 PM Kinsey Brian MD [facility] BS AMB   3/18/2022 12:15 PM Brigette Franco [facility]   3/21/2022 12:45 PM Sue Going, PT [facility]   3/25/2022 11:30 AM Yohannes Bcek, PT [facility]   3/28/2022  1:30 PM Sue Going, PT [facility]   4/1/2022 11:30 AM Yohannes Beck, PT [facility]   5/25/2022 11:05 AM IO LAB VISIT [facility] BS AMB   6/1/2022  8:40 AM Kinsey Brian MD [facility] BS AMB   2/8/2023 12:40 PM Luis Bryant DO [facility] BS AMB

## 2022-03-16 ENCOUNTER — PATIENT MESSAGE (OUTPATIENT)
Dept: INTERNAL MEDICINE CLINIC | Age: 42
End: 2022-03-16

## 2022-03-16 NOTE — PROGRESS NOTES
Xochitl Adrian presents today for No chief complaint on file. F/U  Trembling/body shakes      1. \"Have you been to the ER, urgent care clinic since your last visit? Hospitalized since your last visit? \" no    2. \"Have you seen or consulted any other health care providers outside of the [address] [place] [address] since your last visit? \" yes     3. For patients aged 39-70: Has the patient had a colonoscopy / FIT/ Cologuard? NA - based on age      If the patient is female:    4. For patients aged 41-77: Has the patient had a mammogram within the past 2 years? Yes - no Care Gap present  See top three    5. For patients aged 21-65: Has the patient had a pap smear?  Yes - no Care Gap present

## 2022-03-17 ENCOUNTER — HOSPITAL ENCOUNTER (OUTPATIENT)
Dept: LAB | Age: 42
Discharge: HOME | End: 2022-03-17
Payer: COMMERCIAL

## 2022-03-17 ENCOUNTER — APPOINTMENT (OUTPATIENT)
Dept: INTERNAL MEDICINE CLINIC | Age: 42
End: 2022-03-17

## 2022-03-17 ENCOUNTER — OFFICE VISIT (OUTPATIENT)
Dept: INTERNAL MEDICINE CLINIC | Age: 42
End: 2022-03-17
Payer: COMMERCIAL

## 2022-03-17 VITALS
DIASTOLIC BLOOD PRESSURE: 93 MMHG | WEIGHT: 239 LBS | OXYGEN SATURATION: 97 % | BODY MASS INDEX: 36.22 KG/M2 | HEART RATE: 85 BPM | SYSTOLIC BLOOD PRESSURE: 150 MMHG | HEIGHT: 68 IN | TEMPERATURE: 97.8 F | RESPIRATION RATE: 16 BRPM

## 2022-03-17 DIAGNOSIS — R25.1 TREMOR: Primary | ICD-10-CM

## 2022-03-17 DIAGNOSIS — R25.1 TREMOR: ICD-10-CM

## 2022-03-17 DIAGNOSIS — E03.9 ACQUIRED HYPOTHYROIDISM: ICD-10-CM

## 2022-03-17 DIAGNOSIS — I10 HYPERTENSION, UNSPECIFIED TYPE: ICD-10-CM

## 2022-03-17 LAB
ALBUMIN SERPL-MCNC: 4.2 G/DL (ref 3.4–5)
ALBUMIN/GLOB SERPL: 1.4 {RATIO} (ref 0.8–1.7)
ALP SERPL-CCNC: 63 U/L (ref 45–117)
ALT SERPL-CCNC: 34 U/L (ref 13–56)
ANION GAP SERPL CALC-SCNC: 5 MMOL/L (ref 3–18)
AST SERPL-CCNC: 16 U/L (ref 10–38)
BILIRUB SERPL-MCNC: 0.7 MG/DL (ref 0.2–1)
BUN SERPL-MCNC: 10 MG/DL (ref 7–18)
BUN/CREAT SERPL: 14 (ref 12–20)
CALCIUM SERPL-MCNC: 9.5 MG/DL (ref 8.5–10.1)
CHLORIDE SERPL-SCNC: 105 MMOL/L (ref 100–111)
CO2 SERPL-SCNC: 29 MMOL/L (ref 21–32)
CREAT SERPL-MCNC: 0.74 MG/DL (ref 0.6–1.3)
GLOBULIN SER CALC-MCNC: 3.1 G/DL (ref 2–4)
GLUCOSE SERPL-MCNC: 97 MG/DL (ref 74–99)
POTASSIUM SERPL-SCNC: 3.8 MMOL/L (ref 3.5–5.5)
PROT SERPL-MCNC: 7.3 G/DL (ref 6.4–8.2)
SODIUM SERPL-SCNC: 139 MMOL/L (ref 136–145)
T4 FREE SERPL-MCNC: 1 NG/DL (ref 0.7–1.5)

## 2022-03-17 PROCEDURE — 99214 OFFICE O/P EST MOD 30 MIN: CPT | Performed by: INTERNAL MEDICINE

## 2022-03-17 PROCEDURE — 86376 MICROSOMAL ANTIBODY EACH: CPT

## 2022-03-17 PROCEDURE — 84439 ASSAY OF FREE THYROXINE: CPT

## 2022-03-17 PROCEDURE — 36415 COLL VENOUS BLD VENIPUNCTURE: CPT

## 2022-03-17 PROCEDURE — 80053 COMPREHEN METABOLIC PANEL: CPT

## 2022-03-17 NOTE — PATIENT INSTRUCTIONS
Benign Essential Tremor: Care Instructions  Your Care Instructions     Benign essential tremor is a medical term for shaking that you can't control. Your hand or fingers may shake when you lift a cup or point at something. Or your voice may shake when you speak. This type of tremor is not harmful. It is not caused by a stroke or Parkinson's disease. Some things can affect how much you shake. For example, drinking or eating something with caffeine may make tremors worse for a while. Some medicines also can increase tremors. These include antidepressants and too much thyroid replacement. Talk to your doctor if you think one of your medicines makes your tremors worse. If you are self-conscious about your tremors, there are some things you can do to reduce them or make them less noticeable. This includes taking medicine. Follow-up care is a key part of your treatment and safety. Be sure to make and go to all appointments, and call your doctor if you are having problems. It's also a good idea to know your test results and keep a list of the medicines you take. How can you care for yourself at home? · Take your medicines exactly as prescribed. Call your doctor if you think you are having a problem with your medicine. Some medicines that help control tremors have to be taken every day, even if you are not having tremors. You will get more details on the specific medicines your doctor prescribes. · Get plenty of rest.  · Eat a balanced, healthy diet. · Try to reduce stress. Regular exercise and massages may help. · Limit alcohol. Heavy drinking can make your tremors worse. · Avoid drinks or foods with caffeine if they make your tremors worse. These include tea, cola, coffee, and chocolate. · Wear a heavy bracelet or watch. This adds a little weight to your hand. The extra weight may reduce tremors. · Drink from cups or glasses that are only half full. You may also want to try drinking with a straw.   When should you call for help? Watch closely for changes in your health, and be sure to contact your doctor if:    · You notice your tremors are getting worse.     · You can't do your everyday activities because of your tremors.     · You are sad and embarrassed about your shaking. Where can you learn more? Go to http://www.gray.com/  Enter B746 in the search box to learn more about \"Benign Essential Tremor: Care Instructions. \"  Current as of: December 13, 2021               Content Version: 13.2  © 7492-7686 [employer]. Care instructions adapted under license by [employer] (which disclaims liability or warranty for this information). If you have questions about a medical condition or this instruction, always ask your healthcare professional. [address] any warranty or liability for your use of this information.

## 2022-03-17 NOTE — PROGRESS NOTES
INTERNISTS OF [facility]:  3/17/2022, MRN: [id number]      Niya Vu is a 42 y.o. female and presents to clinic for Follow-up and Hypertension      Subjective: The pt is a 36yo female with h/o ?hypothyroidism, migraine HAs (refractory to botox rx and multiple rx in the past, followed by [facility]), thoracic outlet syndrome (left) s/p surgery 12/17, hip OA, and kidney stones. Tremors and HTN: She was referred back to our office after having tremors during her physical therapy sessions. She shakes more during her PT sessions. \"I've always done it [shake]. \"  +Losing weight. She has not changed her diet. 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 She used to take rx for her thyroid disease. She is no longer on thyroid disease medication. She has had tremors in her legs/hands x 1 year. Sx are off/on. Sx worsen with stress and feeling tired. +Forgetfullness. No palpitations. No CP/SOB. Sx are not worsening. She used to take armour pork thyroid. She has chronic migraine headaches. Headache symptoms are unchanged. S/p unremarkable head CT in 2010. She is getting new glasses. Her last eye exam 3 wks ago. Her blood pressure today is 150/93. She has a history of whitecoat hypertension. She takes metoprolol. No adverse effects. Patient Active Problem List    Diagnosis Date Noted    Anxiety 02/02/2022    Hypertension 02/02/2022    Severe obesity ([facility] [address] 03/08/2019    Family history of premature CAD 12/14/2018    Acquired hypothyroidism 10/07/2018    Migraine 10/07/2018       Current Outpatient Medications   Medication Sig Dispense Refill    Nurtec ODT 75 mg disintegrating tablet TAKE 1 TAB BY MOUTH AS NEEDED FOR MIGRAINE. MAX 1 DOSE IN 24 HOURS. 8 Tablet 0    metoprolol succinate (TOPROL-XL) 25 mg XL tablet Take 1 Tablet by mouth daily.  30 Tablet 5    TENS unit and electrodes (Cefaly) cmpk Cefaly dual. 1 Each 3    acetaminophen (TYLENOL) 325 mg tablet Take 2 Tabs by mouth every six (6) hours as needed for Pain. 20 Tab 0       Allergies   Allergen Reactions    Codeine Hives, Itching, Swelling and Anaphylaxis    Emgality Pen [Galcanezumab-Gnlm] Rash    Norvasc [Amlodipine] Other (comments)     BLE edema    Shellfish Derived Shortness of Breath       Past Medical History:   Diagnosis Date    Adverse effect of anesthesia     HX OF COLLAPSED LUNG    Benign tumor of cervix     Calculus of kidney     Elevated BP without diagnosis of hypertension     Endometriosis     Fracture of finger of right hand 2018    5th digit    H/O thoracic outlet syndrome     Headache     Hypertension     Ill-defined condition     OVARIAN CYST    Ill-defined condition     COLLAPSED LUNG    Migraine headache     Nausea & vomiting     Thyroid disease     hypothyroidism    Trauma        Past Surgical History:   Procedure Laterality Date    HX BREAST REDUCTION      bilateral    HX  SECTION      x2    HX CHOLECYSTECTOMY      HX DILATION AND CURETTAGE      x3    HX HYSTERECTOMY  2015    HX OTHER SURGICAL  2017    REMOVED EXTRA RIB IN SPINE    HX PARTIAL HYSTERECTOMY      HX PELVIC LAPAROSCOPY      VASCULAR SURGERY PROCEDURE UNLIST         Family History   Problem Relation Age of Onset   [facility] Migraines Mother     Hypertension Mother     Cancer Father     Heart Disease Father     Heart Attack Father     No Known Problems Sister     Diabetes Maternal Grandmother     Dementia Maternal Grandmother     No Known Problems Maternal Grandfather     No Known Problems Paternal Grandmother     No Known Problems Paternal Grandfather     Migraines Son     Migraines Daughter        Social History     Tobacco Use    Smoking status: Never Smoker    Smokeless tobacco: Never Used   Substance Use Topics    Alcohol use: Yes     Comment: rare       ROS   Review of Systems   Constitutional: Negative for chills and fever. HENT: Negative for ear pain and sore throat. Eyes: Negative for blurred vision and pain. Respiratory: Negative for cough and shortness of breath. Cardiovascular: Negative for chest pain. Gastrointestinal: Negative for abdominal pain, blood in stool and melena. Genitourinary: Negative for dysuria and hematuria. Musculoskeletal: Positive for joint pain. Negative for myalgias. Skin: Negative for rash. Neurological: Positive for tremors and headaches. Endo/Heme/Allergies: Does not bruise/bleed easily. Psychiatric/Behavioral: Negative for substance abuse. The patient is nervous/anxious (improved). Objective     Vitals:    03/17/22 1333 03/17/22 1337   BP: (!) 153/97 (!) 150/93   Pulse: 85    Resp: 16    Temp: 97.8 °F (36.6 °C)    TempSrc: Temporal    SpO2: 97%    Weight: 239 lb (108.4 kg)    Height: 5' 8\" (1.727 m)    PainSc:   0 - No pain        Physical Exam  Vitals and nursing note reviewed. HENT:      Head: Normocephalic and atraumatic. Right Ear: External ear normal.      Left Ear: External ear normal.   Eyes:      General: No scleral icterus. Right eye: No discharge. Left eye: No discharge. Conjunctiva/sclera: Conjunctivae normal.   Cardiovascular:      Rate and Rhythm: Normal rate and regular rhythm. Heart sounds: Normal heart sounds. No murmur heard. No friction rub. No gallop. Pulmonary:      Effort: Pulmonary effort is normal. No respiratory distress. Breath sounds: Normal breath sounds. No wheezing or rales. Abdominal:      General: Bowel sounds are normal. There is no distension. Palpations: Abdomen is soft. There is no mass. Tenderness: There is no abdominal tenderness. There is no guarding or rebound. Musculoskeletal:         General: No swelling (BUE) or tenderness (BUE). Cervical back: Neck supple. Lymphadenopathy:      Cervical: No cervical adenopathy. Skin:     General: Skin is warm and dry.       Findings: No erythema or rash. Neurological:      Mental Status: She is alert. Mental status is at baseline. Motor: No abnormal muscle tone. Gait: Gait normal.      Comments: No focal neurologic deficit on exam.  She has no resting tremors. She has intention tremor along bilateral upper extremities. Psychiatric:         Mood and Affect: Mood normal.         LABS   Data Review:   Lab Results   Component Value Date/Time    WBC 8.1 12/08/2021 08:56 AM    HGB 12.7 12/08/2021 08:56 AM    HCT 38.5 12/08/2021 08:56 AM    PLATELET 944 20/60/4645 08:56 AM    MCV 95.8 12/08/2021 08:56 AM       Lab Results   Component Value Date/Time    Sodium 141 11/30/2021 12:00 AM    Potassium 3.6 11/30/2021 12:00 AM    Chloride 100 11/30/2021 12:00 AM    CO2 24 11/30/2021 12:00 AM    Anion gap 6 08/21/2019 11:52 AM    Glucose 84 11/30/2021 12:00 AM    BUN 9 11/30/2021 12:00 AM    Creatinine 0.77 11/30/2021 12:00 AM    BUN/Creatinine ratio 12 11/30/2021 12:00 AM    GFR est  11/30/2021 12:00 AM    GFR est non-AA 96 11/30/2021 12:00 AM    Calcium 9.3 11/30/2021 12:00 AM       Lab Results   Component Value Date/Time    Cholesterol, total 186 11/30/2021 12:00 AM    HDL Cholesterol 40 11/30/2021 12:00 AM    LDL, calculated 114 (H) 11/30/2021 12:00 AM    LDL, calculated 109.8 (H) 08/21/2019 11:52 AM    VLDL, calculated 32 11/30/2021 12:00 AM    VLDL, calculated 18.2 08/21/2019 11:52 AM    Triglyceride 181 (H) 11/30/2021 12:00 AM    CHOL/HDL Ratio 4.2 08/21/2019 11:52 AM       Lab Results   Component Value Date/Time    Hemoglobin A1c 5.5 11/30/2021 12:00 AM    Hemoglobin A1c, External 5.3 11/09/2018 12:00 AM       Assessment/Plan:   Tremor and HTN: +H/o thyroid disease, off of rx. Her blood pressure is elevated today. White coat HTN? We will check labs today. PE findings are consistent with a benign essential tremor. This was discussed with her today. She was instructed to notify me if her symptoms change.   I will have her return to clinic for BP check. Continue with metoprolol. If her blood pressure is elevated at her follow-up visit, I will need to add additional medication. ORDERS:  - METABOLIC PANEL, COMPREHENSIVE; Future  - T4, FREE; Future        Health Maintenance Due   Topic Date Due    Hepatitis C Screening  Never done    COVID-19 Vaccine (1) Never done     Lab review: labs are reviewed in the EHR and ordered as mentioned above. I have discussed the diagnosis with the patient and the intended plan as seen in the above orders. The patient has received an after-visit summary and questions were answered concerning future plans. I have discussed medication side effects and warnings with the patient as well. I have reviewed the plan of care with the patient, accepted their input and they are in agreement with the treatment goals. All questions were answered. The patient understands the plan of care. Handouts provided today with above information. Pt instructed if symptoms worsen to call the office or report to the ED for continued care. Greater than 50% of the visit time was spent in counseling and/or coordination of care. Voice recognition was used to generate this report, which may have resulted in some phonetic based errors in grammar and contents. Even though attempts were made to correct all the mistakes, some may have been missed, and remained in the body of the document.           Janene Londono MD

## 2022-03-17 NOTE — PROGRESS NOTES
Xochitl Adrian presents today for   Chief Complaint   Patient presents with    Follow-up    Hypertension       1. \"Have you been to the ER, urgent care clinic since your last visit? Hospitalized since your last visit? \" no    2. \"Have you seen or consulted any other health care providers outside of the [address] [place] [address] since your last visit? \" no     3. For patients aged 39-70: Has the patient had a colonoscopy / FIT/ Cologuard? NA - based on age      If the patient is female:    4. For patients aged 41-77: Has the patient had a mammogram within the past 2 years? Yes - no Care Gap present  See top three    5. For patients aged 21-65: Has the patient had a pap smear?  NA - based on age or sex

## 2022-03-18 ENCOUNTER — HOSPITAL ENCOUNTER (OUTPATIENT)
Dept: PHYSICAL THERAPY | Age: 42
Discharge: HOME | End: 2022-03-18
Payer: COMMERCIAL

## 2022-03-18 PROBLEM — Z82.49 FAMILY HISTORY OF PREMATURE CAD: Status: ACTIVE | Noted: 2018-12-14

## 2022-03-18 PROBLEM — E03.9 ACQUIRED HYPOTHYROIDISM: Status: ACTIVE | Noted: 2018-10-07

## 2022-03-18 PROCEDURE — 97110 THERAPEUTIC EXERCISES: CPT

## 2022-03-18 PROCEDURE — 97112 NEUROMUSCULAR REEDUCATION: CPT

## 2022-03-18 PROCEDURE — 97530 THERAPEUTIC ACTIVITIES: CPT

## 2022-03-18 NOTE — PROGRESS NOTES
PT DAILY TREATMENT NOTE    Patient Name: Karly Gore  Date:3/18/2022  : 1980  [x]  Patient  Verified  Payor: Lionel Veras / Plan: [address] [place] [address] / Product Type: PPO /    In time:12:15  Out time:1:00  Total Treatment Time (min): 45  Total Timed Codes (min): 45  1:1 Treatment Time (MC/BCBS only): 45   Visit #: 7 of 12    Treatment Dx: Left hip pain [M25.552]    SUBJECTIVE  Pain Level (0-10 scale): 3 hip  Any medication changes, allergies to medications, adverse drug reactions, diagnosis change, or new procedure performed?: [x] No    [] Yes (see summary sheet for update)  Subjective functional status/changes:   [] No changes reported  \"MD stating shaking are non essential tremors but they are doing blood work. I landed weird on my left LE yesterday coming out of the grocery and I tried to protect my left hip so my knee is hurting in the front and now my hip is hurting a little from walking weird. \"    OBJECTIVE      10 min Therapeutic Exercise:  [x] See flow sheet :   Rationale: increase ROM and increase strength to improve the patients ability to perform daily activities with decreased pain and symptom levels    13 min Therapeutic Activity:  [x]  See flow sheet :   Rationale: increase strength, improve coordination and increase proprioception  to improve the patients ability to perform daily activities with decreased pain and symptom levels     22 min Neuromuscular Re-education:  [x]  See flow sheet :   Rationale: increase strength, improve coordination, improve balance and increase proprioception  to improve the patients ability to perform daily activities with decreased pain and symptom levels          With   [] TE   [] TA   [] neuro   [] other: Patient Education: [x] Review HEP    [] Progressed/Changed HEP based on:   [] positioning   [] body mechanics   [] transfers   [] heat/ice application    [] other:      Other Objective/Functional Measures:   TTP left quad tendon  Fatigue with stadnign left hip hike  Decreased clearance with left hip add with lay in glasses       Pain Level (0-10 scale) post treatment: 1.5    ASSESSMENT/Changes in Function: Pt tolerated session well with some modification to exercises due to increased left knee pain. Suspect left knee pain due to quad tendon sprain with pt able to fully WB on left LE and great quad set with TTP at quad tendon after slipping not falling while walking out of grocery store today. Able to decrease knee and hip pain post session with exercises recruiting left adductor and left glut med and emphasis of heel strike with gait and standing up. Patient will continue to benefit from skilled PT services to modify and progress therapeutic interventions, address functional mobility deficits, address ROM deficits, address strength deficits, analyze and cue movement patterns, analyze and modify body mechanics/ergonomics, assess and modify postural abnormalities and instruct in home and community integration to attain remaining goals. [x]  See Plan of Care  []  See progress note/recertification  []  See Discharge Summary         Progress towards goals / Updated goals:  Short Term Goals: To be accomplished in 3 weeks:              1. Patient is compliant with daily HEP for prophylaxis and progress towards all goals  Status at Eval: HEP initiated  Current: compliance per pt report goal MET 3/18/22     2. Patient demonstrates negative Adduction drop test for both hips indicating improvement in lumbo-pelvic alignment and femoral acetabular mobility needed for gait  Status at Eval: bilateral positive ADT  Current status 3/14/22: ADT to midline post session, progressing     3.  Improved trunk rotation to <or= 15 in bilaterally without indicating mobility needed to perform all ADL and ambulation  Status at Eval: trunk rotation limited to right 18 in, left 17.5 in, rib cage stiffness, shallow breathing pattern   Current 3/3/22: LTR: right = 17.5 inches, left = 17 inches - progressing           Long Term Goals: To be accomplished in 6 weeks:              1. Improved FOTO score to >or= 68/100 indicating improvement in overall function including increased tolerance to left S/L at night, increased ease getting in/out of car and for prolonged walking during ADL  Status at Eval: 57/100, pain with above activities  Current 3/8/22: 66 - progressing      2. Patient demonstrates ability to perform YARON squat test >or= 3/5 with good postural alignment and ability to bring thighs to parallel position with floor indicating mobility and stability needed for ADL  Status at Eval: Squat test 2/5, limited range with testing , fwd WS with LS hyperlordosis     3. Patient demonstrates negative Gaudencio test and IR >or= 30 deg for left hip without pain at end range for functional mobility with ADL  Status at Eval: sharp pain with end range left hip IR in supine, + left Gaudencio test  Current status 3/14/22: left hip IR 30 deg without pain, goal partially met     4. Patient demonstrates ability to perform bridging x10 with good height and alternating leg kicks with good pelvic stability indicating functional hip strength  Status at Eval: challenged with full height bridging, lacking pelvic stability, strength deficit in hamstrings, gluts, ADD/ABD  Current status 3/14/22: demonstrates ability to perform partial height bridging without LBP, progressing       PLAN  []  Upgrade activities as tolerated     [x]  Continue plan of care  []  Update interventions per flow sheet       []  Discharge due to:_  []  Other:_      Lehman Nima 3/18/2022  10:09 AM    Future Appointments   Date Time Provider Finn Beth   3/18/2022 12:15 PM Amelie Franco [facility]   3/21/2022 12:45 PM RUDDY Amaral [facility]   3/25/2022 11:30 AM RUDDY Rojo [facility]   3/28/2022  1:30 PM RUDDY Amaral [facility]   4/1/2022 11:30 AM RUDDY Rojo [facility]   5/25/2022 11:05 AM [facility] LAB VISIT [facility] BS AMB   6/1/2022  8:40 AM Farrah Lopes MD [facility] BS AMB   2/8/2023 12:40 PM Suma Lazaro DO [facility] BS AMB

## 2022-03-19 PROBLEM — E66.01 SEVERE OBESITY (HCC): Status: ACTIVE | Noted: 2019-03-08

## 2022-03-19 PROBLEM — G43.909 MIGRAINE: Status: ACTIVE | Noted: 2018-10-07

## 2022-03-20 PROBLEM — I10 HYPERTENSION: Status: ACTIVE | Noted: 2022-02-02

## 2022-03-20 PROBLEM — F41.9 ANXIETY: Status: ACTIVE | Noted: 2022-02-02

## 2022-03-20 LAB
THYROGLOB AB SERPL-ACNC: <1 IU/ML (ref 0–0.9)
THYROPEROXIDASE AB SERPL-ACNC: 10 IU/ML (ref 0–34)

## 2022-03-21 ENCOUNTER — HOSPITAL ENCOUNTER (OUTPATIENT)
Dept: PHYSICAL THERAPY | Age: 42
Discharge: HOME | End: 2022-03-21
Payer: COMMERCIAL

## 2022-03-21 PROCEDURE — 97110 THERAPEUTIC EXERCISES: CPT

## 2022-03-21 PROCEDURE — 97112 NEUROMUSCULAR REEDUCATION: CPT

## 2022-03-21 NOTE — PROGRESS NOTES
PT DAILY TREATMENT NOTE    Patient Name: Kadie Sánchez  Date:3/21/2022  : 1980  [x]  Patient  Verified  Payor: Jose Guevara / Plan: [address] [place] [address] / Product Type: PPO /    In time:1245  Out time:134  Total Treatment Time (min): 49  Total Timed Codes (min): 49  1:1 Treatment Time (MC/BCBS only): 52   Visit #: 8 of 12    Treatment Dx: Left hip pain [M25.552]    SUBJECTIVE  Pain Level (0-10 scale): 0 hip  Any medication changes, allergies to medications, adverse drug reactions, diagnosis change, or new procedure performed?: [x] No    [] Yes (see summary sheet for update)  Subjective functional status/changes:   [] No changes reported  Still some pain left knee but feeling better    OBJECTIVE      15 min Therapeutic Exercise:  [x] See flow sheet :   Rationale: increase ROM, increase strength and improve coordination to improve the patients ability to perform ADL       34 min Neuromuscular Re-education:  [x]  See flow sheet :added retro steps and stairs   Rationale: increase ROM, increase strength, improve coordination and increase proprioception  to improve the patients ability to perform ADL with improved alignment            With   [] TE   [] TA   [x] neuro   [] other: Patient Education: [x] Review HEP    [] Progressed/Changed HEP based on:   [] positioning   [] body mechanics   [] transfers   [] heat/ice application    [] other:      Other Objective/Functional Measures:   Beginning of session: ADT both +, LTR right 16.5, left 17  Post session:left ADT past midline, LTR both 15.5 in      Pain Level (0-10 scale) post treatment: 0    ASSESSMENT/Changes in Function: Post session: LTR both 15.5 in, ADT left past midline    Patient will continue to benefit from skilled PT services to address ROM deficits, address strength deficits, analyze and address soft tissue restrictions and analyze and cue movement patterns to attain remaining goals.      [x]  See Plan of Care  []  See progress note/recertification  []  See Discharge Summary         Progress towards goals / Updated goals:  Short Term Goals: To be accomplished in 3 weeks:              1. Patient is compliant with daily HEP for prophylaxis and progress towards all goals  Status at Eval: HEP initiated  Current: compliance per pt report goal MET 3/18/22     2. Patient demonstrates negative Adduction drop test for both hips indicating improvement in lumbo-pelvic alignment and femoral acetabular mobility needed for gait  Status at Eval: bilateral positive ADT  Current status 3/21/22: ADT past midline post session, progressing     3. Improved trunk rotation to <or= 15 in bilaterally without indicating mobility needed to perform all ADL and ambulation  Status at Eval: trunk rotation limited to right 18 in, left 17.5 in, rib cage stiffness, shallow breathing pattern   Current 3/21/22: LTR: right = 15.5 inches bilaterally - progressing           Long Term Goals: To be accomplished in 6 weeks:              1. Improved FOTO score to >or= 68/100 indicating improvement in overall function including increased tolerance to left S/L at night, increased ease getting in/out of car and for prolonged walking during ADL  Status at Eval: 57/100, pain with above activities  Current 3/8/22: 66 - progressing      2. Patient demonstrates ability to perform YARON squat test >or= 3/5 with good postural alignment and ability to bring thighs to parallel position with floor indicating mobility and stability needed for ADL  Status at Eval: Squat test 2/5, limited range with testing , fwd WS with LS hyperlordosis     3. Patient demonstrates negative Gaudencio test and IR >or= 30 deg for left hip without pain at end range for functional mobility with ADL  Status at Eval: sharp pain with end range left hip IR in supine, + left Gaudencio test  Current status 3/14/22: left hip IR 30 deg without pain, goal partially met     4. Patient demonstrates ability to perform bridging x10 with good height and alternating leg kicks with good pelvic stability indicating functional hip strength  Status at Eval: challenged with full height bridging, lacking pelvic stability, strength deficit in hamstrings, gluts, ADD/ABD  Current status 3/14/22: demonstrates ability to perform partial height bridging without LBP, progressing       PLAN  []  Upgrade activities as tolerated     [x]  Continue plan of care  []  Update interventions per flow sheet       []  Discharge due to:_  []  Other:_      Colby Orellana, PT 3/21/2022  12:54 PM    Future Appointments   Date Time Provider Finn Beth   3/25/2022 11:30 AM Giovani Manuel, PT [facility]PTBW [facility]   3/28/2022  1:30 PM Ariela Becker, PT [facility]   4/1/2022 11:30 AM Giovani Manuel, PT [facility]PT[facility]   5/25/2022 11:05 AM [facility] LAB VISIT [facility] BS AMB   6/1/2022  8:40 AM Yanelis Maharaj MD [facility] BS AMB   2/8/2023 12:40 PM Yaz Guido DO [facility] BS AMB

## 2022-03-24 ENCOUNTER — HOSPITAL ENCOUNTER (OUTPATIENT)
Dept: PHYSICAL THERAPY | Age: 42
Discharge: HOME | End: 2022-03-24
Payer: COMMERCIAL

## 2022-03-24 PROCEDURE — 97112 NEUROMUSCULAR REEDUCATION: CPT

## 2022-03-24 PROCEDURE — 97110 THERAPEUTIC EXERCISES: CPT

## 2022-03-24 NOTE — PROGRESS NOTES
PT DAILY TREATMENT NOTE    Patient Name: Corrin Schilder  Date:3/24/2022  : 1980  [x]  Patient  Verified  Payor: Mandy Levine / Plan: [address] [place] [id number] / Product Type: PPO /    In time:515  Out time:600  Total Treatment Time (min): 45  Total Timed Codes (min): 45  1:1 Treatment Time (MC/BCBS only): 45   Visit #: 9 of 9 + 4    Treatment Dx: Left hip pain [M25.552]    SUBJECTIVE  Pain Level (0-10 scale): 2 left hip  Any medication changes, allergies to medications, adverse drug reactions, diagnosis change, or new procedure performed?: [x] No    [] Yes (see summary sheet for update)  Subjective functional status/changes:   [] No changes reported  Reports feeling much better, still having occasional pain    OBJECTIVE      20 min Therapeutic Exercise:  [x] See flow sheet :   Rationale: increase ROM, increase strength, improve coordination and increase proprioception to improve the patients ability to perform ADL     25 min Neuromuscular Re-education:  [x]  See flow sheet :   Rationale: increase ROM, increase strength, improve coordination, increase proprioception and self correction of alignment  to improve the patients ability to return to PLOF and regular workout routine            With   [] TE   [] TA   [x] neuro   [] other: Patient Education: [x] Review HEP    [] Progressed/Changed HEP based on:   [] positioning   [] body mechanics   [] transfers   [] heat/ice application    [] other:      Other Objective/Functional Measures:   Squat test 2/5, improved PPT  ADD weakness, residual left posterior capsule tightness  Left hip IR supine 37, no pain  ADT to midline post session       Pain Level (0-10 scale) post treatment: 0    ASSESSMENT/Changes in Function: good tolerance to activities, challenged with squat and ADD lifting, no pain at end of session with ambulation    Patient will continue to benefit from skilled PT services to address ROM deficits, address strength deficits, analyze and address soft tissue restrictions and analyze and cue movement patterns to attain remaining goals. [x]  See Plan of Care  []  See progress note/recertification  []  See Discharge Summary           Progress towards goals / Updated goals:  Short Term Goals: To be accomplished in 3 weeks:              1. Patient is compliant with daily HEP for prophylaxis and progress towards all goals  Status at Eval: HEP initiated  Current: compliance per pt report goal MET 3/18/22     2. Patient demonstrates negative Adduction drop test for both hips indicating improvement in lumbo-pelvic alignment and femoral acetabular mobility needed for gait  Status at Eval: bilateral positive ADT  Current status 3/24/22: ADT to midline post session, progressing     3. Improved trunk rotation to <or= 15 in bilaterally without indicating mobility needed to perform all ADL and ambulation  Status at Eval: trunk rotation limited to right 18 in, left 17.5 in, rib cage stiffness, shallow breathing pattern   Current 3/21/22: LTR: right = 15 inches, left 14.5 in - MET        Long Term Goals: To be accomplished in 6 weeks:              1. Improved FOTO score to >or= 68/100 indicating improvement in overall function including increased tolerance to left S/L at night, increased ease getting in/out of car and for prolonged walking during ADL  Status at Eval: 57/100, pain with above activities  Current 3/8/22: 66 - progressing      2. Patient demonstrates ability to perform YARON squat test >or= 3/5 with good postural alignment and ability to bring thighs to parallel position with floor indicating mobility and stability needed for ADL  Status at Eval: Squat test 2/5, limited range with testing , fwd WS with LS hyperlordosis  Current status 3/24/22: squat test 2/5 however improved control of PPT, progressing     3.  Patient demonstrates negative Gaudencio test and IR >or= 30 deg for left hip without pain at end range for functional mobility with ADL  Status at Eval: sharp pain with end range left hip IR in supine, + left Gaudencio test  Current status 3/14/22: left hip IR 30 deg without pain, negative Gaudencio test left, MET     4.Patient demonstrates ability to perform bridging x10 with good height and alternating leg kicks with good pelvic stability indicating functional hip strength  Status at Eval: challenged with full height bridging, lacking pelvic stability, strength deficit in hamstrings, gluts, ADD/ABD  Current status 3/24/22: demonstrates ability to perform full  bridging without LBP, MET       PLAN  []  Upgrade activities as tolerated     [x]  Continue plan of care  []  Update interventions per flow sheet       []  Discharge due to:_  []  Other:_      Debbie Sewell, PT 3/24/2022  5:43 PM    Future Appointments   Date Time Provider Finn Beth   3/28/2022  1:30 PM Christine Perez, PT [facility]PTBW [facility]   4/1/2022 11:30 AM Debra Blankenship, PT MIHPNAWAF [facility]   5/25/2022 11:05 AM [facility] LAB VISIT [facility] BS AMB   6/1/2022  8:40 AM Yovani Conrad MD [facility] BS AMB   2/8/2023 12:40 PM Porsche Pardo DO [facility] BS AMB

## 2022-03-24 NOTE — PROGRESS NOTES
[facility] at the [address], [facility] [place], [address] [facility]  Phone: [phone]      Fax:  [phone]    Progress Note  Patient name: Naomi Chambers Start of Care: 2022   Referral source: Lo Shepard MD : 1980               Medical Diagnosis: Left hip pain [M25.552]    Onset Date:2020               Treatment Diagnosis: left hip pain   Prior Hospitalization: see medical history Provider#: [id number]   Medications: Verified on Patient summary List    Comorbidities: s/p right ankle sprain, chronic LBP/CS pain left, sleep apnea, s/p removal of left first rib   Prior Level of Function: Full function without hip pain prior to fall/ankle sprain in     Visits from Start of Care: 9    Missed Visits: 0      Progress towards goals / Updated goals:  Short Term Goals: To be accomplished in 3 weeks:              1. Patient is compliant with daily HEP for prophylaxis and progress towards all goals  Status at Eval: HEP initiated  Current: compliance per pt report goal MET 3/18/22     2. Patient demonstrates negative Adduction drop test for both hips indicating improvement in lumbo-pelvic alignment and femoral acetabular mobility needed for gait  Status at Eval: bilateral positive ADT  Current status 3/24/22: ADT to midline post session, progressing     3. Improved trunk rotation to <or= 15 in bilaterally without indicating mobility needed to perform all ADL and ambulation  Status at Eval: trunk rotation limited to right 18 in, left 17.5 in, rib cage stiffness, shallow breathing pattern   Current 3/21/22: LTR: right = 15 inches, left 14.5 in - MET        Long Term Goals: To be accomplished in 6 weeks:              1.  Improved FOTO score to >or= 68/100 indicating improvement in overall function including increased tolerance to left S/L at night, increased ease getting in/out of car and for prolonged walking during ADL  Status at Eval: 57/100, pain with above activities  Current 3/8/22: 66 - progressing      2. Patient demonstrates ability to perform YARON squat test >or= 3/5 with good postural alignment and ability to bring thighs to parallel position with floor indicating mobility and stability needed for ADL  Status at Eval: Squat test 2/5, limited range with testing , fwd WS with LS hyperlordosis  Current status 3/24/22: squat test 2/5 however improved control of PPT, progressing     3. Patient demonstrates negative Gaudencio test and IR >or= 30 deg for left hip without pain at end range for functional mobility with ADL  Status at Eval: sharp pain with end range left hip IR in supine, + left Gaudencio test  Current status 3/14/22: left hip IR 30 deg without pain, negative Gaudencio test left, MET     4.Patient demonstrates ability to perform bridging x10 with good height and alternating leg kicks with good pelvic stability indicating functional hip strength  Status at Eval: challenged with full height bridging, lacking pelvic stability, strength deficit in hamstrings, gluts, ADD/ABD  Current status 3/24/22: demonstrates ability to perform full  bridging without LBP, MET         Key Functional Changes: reduction in pain with ADL, improved left hip mobility  Updated Goals: to be achieved in 4 weeks:   Remaining goals as above  ASSESSMENT/RECOMMENDATIONS:Mrs. Oma Rizzo has attended 9 PT sessions including initial evaluation with focus on postural alignment, pelvic /rib cage repositioning, neuromuscular reeducation, modalities as needed and instruction in HEP. Patient has shown excellent improvement in left hip mobility and alignment however continues with deficits in strength and lumbo-pelvic control. I recommend to continue with current treatment to address remaining goal/deficits.    [x]Continue therapy per initial plan/protocol at a frequency of  2 x per week for 1 week followed by 1x per week for 3 weeks    Thank you for this referral.   Roseann Ritter, PT 3/24/2022 6:13 PM

## 2022-03-25 ENCOUNTER — APPOINTMENT (OUTPATIENT)
Dept: PHYSICAL THERAPY | Age: 42
End: 2022-03-25
Payer: COMMERCIAL

## 2022-03-28 ENCOUNTER — APPOINTMENT (OUTPATIENT)
Dept: PHYSICAL THERAPY | Age: 42
End: 2022-03-28
Payer: COMMERCIAL

## 2022-03-28 NOTE — PROGRESS NOTES
Her thyroid labs and CMP are unremarkable.     Dr. Xena Solitario  [facility], [facility], [facility]  Phone: [phone]  Fax: [phone]

## 2022-03-30 ENCOUNTER — PATIENT MESSAGE (OUTPATIENT)
Dept: INTERNAL MEDICINE CLINIC | Age: 42
End: 2022-03-30

## 2022-04-01 ENCOUNTER — APPOINTMENT (OUTPATIENT)
Dept: PHYSICAL THERAPY | Age: 42
End: 2022-04-01
Payer: COMMERCIAL

## 2022-04-06 NOTE — TELEPHONE ENCOUNTER
----- Message from Carver Opitz sent at 4/6/2022 10:23 AM EDT -----  Subject: Referral Request    QUESTIONS   Reason for referral request? Need Urologist in [place], Has   Kidney Stones to pass, Went to ER a week ago   Has the physician seen you for this condition before? No   Preferred Specialist (if applicable)? Do you already have an appointment scheduled? Additional Information for Provider? Instructed to get with PCP to get   referral for Urologists. Also need to know if she needs and appointment   with PCP first or if referral can just be done.   ---------------------------------------------------------------------------  --------------  CALL BACK INFO  What is the best way for the office to contact you? OK to leave message on   voicemail  Preferred Call Back Phone Number? [phone]  ---------------------------------------------------------------------------  --------------  SCRIPT ANSWERS  Relationship to Patient?  Self

## 2022-04-07 ENCOUNTER — HOSPITAL ENCOUNTER (EMERGENCY)
Age: 42
Discharge: HOME | End: 2022-04-07
Attending: EMERGENCY MEDICINE
Payer: COMMERCIAL

## 2022-04-07 ENCOUNTER — APPOINTMENT (OUTPATIENT)
Dept: PHYSICAL THERAPY | Age: 42
End: 2022-04-07
Payer: COMMERCIAL

## 2022-04-07 ENCOUNTER — TELEPHONE (OUTPATIENT)
Dept: INTERNAL MEDICINE CLINIC | Age: 42
End: 2022-04-07

## 2022-04-07 ENCOUNTER — APPOINTMENT (OUTPATIENT)
Dept: CT IMAGING | Age: 42
End: 2022-04-07
Attending: EMERGENCY MEDICINE
Payer: COMMERCIAL

## 2022-04-07 VITALS
OXYGEN SATURATION: 100 % | RESPIRATION RATE: 18 BRPM | SYSTOLIC BLOOD PRESSURE: 160 MMHG | WEIGHT: 225 LBS | TEMPERATURE: 97.9 F | HEIGHT: 68 IN | BODY MASS INDEX: 34.1 KG/M2 | DIASTOLIC BLOOD PRESSURE: 101 MMHG | HEART RATE: 75 BPM

## 2022-04-07 DIAGNOSIS — N20.1 URETERIC STONE: Primary | ICD-10-CM

## 2022-04-07 DIAGNOSIS — N13.30 HYDRONEPHROSIS, UNSPECIFIED HYDRONEPHROSIS TYPE: ICD-10-CM

## 2022-04-07 LAB
ALBUMIN SERPL-MCNC: 3.5 G/DL (ref 3.4–5)
ALBUMIN/GLOB SERPL: 0.9 {RATIO} (ref 0.8–1.7)
ALP SERPL-CCNC: 59 U/L (ref 45–117)
ALT SERPL-CCNC: 28 U/L (ref 13–56)
ANION GAP SERPL CALC-SCNC: 6 MMOL/L (ref 3–18)
APPEARANCE UR: CLEAR
AST SERPL-CCNC: 15 U/L (ref 10–38)
BACTERIA URNS QL MICRO: ABNORMAL /HPF
BASOPHILS # BLD: 0.1 K/UL (ref 0–0.1)
BASOPHILS NFR BLD: 1 % (ref 0–2)
BILIRUB SERPL-MCNC: 0.5 MG/DL (ref 0.2–1)
BILIRUB UR QL: NEGATIVE
BUN SERPL-MCNC: 10 MG/DL (ref 7–18)
BUN/CREAT SERPL: 15 (ref 12–20)
CALCIUM SERPL-MCNC: 9.3 MG/DL (ref 8.5–10.1)
CHLORIDE SERPL-SCNC: 103 MMOL/L (ref 100–111)
CO2 SERPL-SCNC: 31 MMOL/L (ref 21–32)
COLOR UR: YELLOW
CREAT SERPL-MCNC: 0.67 MG/DL (ref 0.6–1.3)
DIFFERENTIAL METHOD BLD: ABNORMAL
EOSINOPHIL # BLD: 0.4 K/UL (ref 0–0.4)
EOSINOPHIL NFR BLD: 4 % (ref 0–5)
EPITH CASTS URNS QL MICRO: ABNORMAL /LPF (ref 0–5)
ERYTHROCYTE [DISTWIDTH] IN BLOOD BY AUTOMATED COUNT: 12.5 % (ref 11.6–14.5)
GLOBULIN SER CALC-MCNC: 3.9 G/DL (ref 2–4)
GLUCOSE SERPL-MCNC: 107 MG/DL (ref 74–99)
GLUCOSE UR STRIP.AUTO-MCNC: NEGATIVE MG/DL
HCG SERPL QL: NEGATIVE
HCT VFR BLD AUTO: 39.2 % (ref 35–45)
HGB BLD-MCNC: 13.4 G/DL (ref 12–16)
HGB UR QL STRIP: ABNORMAL
IMM GRANULOCYTES # BLD AUTO: 0 K/UL (ref 0–0.04)
IMM GRANULOCYTES NFR BLD AUTO: 0 % (ref 0–0.5)
KETONES UR QL STRIP.AUTO: NEGATIVE MG/DL
LEUKOCYTE ESTERASE UR QL STRIP.AUTO: NEGATIVE
LYMPHOCYTES # BLD: 1.9 K/UL (ref 0.9–3.6)
LYMPHOCYTES NFR BLD: 19 % (ref 21–52)
MCH RBC QN AUTO: 32 PG (ref 24–34)
MCHC RBC AUTO-ENTMCNC: 34.2 G/DL (ref 31–37)
MCV RBC AUTO: 93.6 FL (ref 78–100)
MONOCYTES # BLD: 0.7 K/UL (ref 0.05–1.2)
MONOCYTES NFR BLD: 7 % (ref 3–10)
NEUTS SEG # BLD: 7 K/UL (ref 1.8–8)
NEUTS SEG NFR BLD: 70 % (ref 40–73)
NITRITE UR QL STRIP.AUTO: NEGATIVE
NRBC # BLD: 0 K/UL (ref 0–0.01)
NRBC BLD-RTO: 0 PER 100 WBC
PH UR STRIP: 7.5 [PH] (ref 5–8)
PLATELET # BLD AUTO: 453 K/UL (ref 135–420)
PMV BLD AUTO: 8.8 FL (ref 9.2–11.8)
POTASSIUM SERPL-SCNC: 3.4 MMOL/L (ref 3.5–5.5)
PROT SERPL-MCNC: 7.4 G/DL (ref 6.4–8.2)
PROT UR STRIP-MCNC: NEGATIVE MG/DL
RBC # BLD AUTO: 4.19 M/UL (ref 4.2–5.3)
RBC #/AREA URNS HPF: ABNORMAL /HPF (ref 0–5)
SODIUM SERPL-SCNC: 140 MMOL/L (ref 136–145)
SP GR UR REFRACTOMETRY: <1.005 (ref 1–1.03)
UROBILINOGEN UR QL STRIP.AUTO: 0.2 EU/DL (ref 0.2–1)
WBC # BLD AUTO: 10 K/UL (ref 4.6–13.2)
WBC URNS QL MICRO: ABNORMAL /HPF (ref 0–4)

## 2022-04-07 PROCEDURE — 81001 URINALYSIS AUTO W/SCOPE: CPT

## 2022-04-07 PROCEDURE — 51798 US URINE CAPACITY MEASURE: CPT

## 2022-04-07 PROCEDURE — 87086 URINE CULTURE/COLONY COUNT: CPT

## 2022-04-07 PROCEDURE — 99284 EMERGENCY DEPT VISIT MOD MDM: CPT

## 2022-04-07 PROCEDURE — 80053 COMPREHEN METABOLIC PANEL: CPT

## 2022-04-07 PROCEDURE — 74011250636 HC RX REV CODE- 250/636: Performed by: EMERGENCY MEDICINE

## 2022-04-07 PROCEDURE — 96374 THER/PROPH/DIAG INJ IV PUSH: CPT

## 2022-04-07 PROCEDURE — 84703 CHORIONIC GONADOTROPIN ASSAY: CPT

## 2022-04-07 PROCEDURE — 74176 CT ABD & PELVIS W/O CONTRAST: CPT

## 2022-04-07 PROCEDURE — 96361 HYDRATE IV INFUSION ADD-ON: CPT

## 2022-04-07 PROCEDURE — 85025 COMPLETE CBC W/AUTO DIFF WBC: CPT

## 2022-04-07 RX ORDER — OXYCODONE AND ACETAMINOPHEN 5; 325 MG/1; MG/1
1 TABLET ORAL
Qty: 12 TABLET | Refills: 0 | Status: SHIPPED | OUTPATIENT
Start: 2022-04-07 | End: 2022-04-10

## 2022-04-07 RX ORDER — TAMSULOSIN HYDROCHLORIDE 0.4 MG/1
0.4 CAPSULE ORAL DAILY
Qty: 15 CAPSULE | Refills: 0 | Status: SHIPPED | OUTPATIENT
Start: 2022-04-07 | End: 2022-04-22

## 2022-04-07 RX ORDER — KETOROLAC TROMETHAMINE 10 MG/1
10 TABLET, FILM COATED ORAL
Qty: 10 TABLET | Refills: 0 | Status: SHIPPED | OUTPATIENT
Start: 2022-04-07 | End: 2022-06-01

## 2022-04-07 RX ORDER — KETOROLAC TROMETHAMINE 15 MG/ML
15 INJECTION, SOLUTION INTRAMUSCULAR; INTRAVENOUS ONCE
Status: COMPLETED | OUTPATIENT
Start: 2022-04-07 | End: 2022-04-07

## 2022-04-07 RX ADMIN — KETOROLAC TROMETHAMINE 15 MG: 15 INJECTION, SOLUTION INTRAMUSCULAR; INTRAVENOUS at 12:09

## 2022-04-07 RX ADMIN — SODIUM CHLORIDE 1000 ML: 900 INJECTION, SOLUTION INTRAVENOUS at 12:09

## 2022-04-07 NOTE — ED TRIAGE NOTES
Patient states being evaluated at [facility] on 3/27/22 related to left flank pain. She states being dx with 5mm kidney stone. She advises left flank pain, urinary retention, and bladder pain.

## 2022-04-07 NOTE — ED NOTES
Bladder scan performed s/p patient attempt to void. Less than 5cc urine noted in urine cup s/p void attempt. Bladder scan indicated 52 ml urine present in urinary bladder. MD made aware.

## 2022-04-07 NOTE — ED NOTES
Labs drawn off IV site to right [facility] #20g labeled at bedside and walked to lab. Pt tolerated well.

## 2022-04-07 NOTE — TELEPHONE ENCOUNTER
Pt called asking to speak with nurse - pt states she was seen on 03/27 - at [address] ED for kidney stones , todays she states she is having the urgency to go but can not urinate - she said she is in a lot of pain and asking for the nurse to call her back for some guidance on what to do

## 2022-04-07 NOTE — ED NOTES
Pt was medicated with IV Toradol, IV fluid NS bolus infusing via gravity to R AC IV site without sx's of infiltration noted to IV site. Pt resting in position of comfort on stretcher. Call bell within easy reach. Pt was instructed to call for assistance when she feels that she needs to void. Pt verbalized understanding. Pt reports that she has not been drinking fluids since last night because that seemed to worsen her symptoms. When bladder scan was performed 52ml urine noted in bladder. Dr. Last Patel is aware. Upon arrival, pt attempted to collect clean catch urine specimen but was only able to provide a few drops of urine. Pt is aware that a new specimen will be needed.

## 2022-04-07 NOTE — DISCHARGE INSTRUCTIONS
Follow-up with urology for kidney stone management and also CT urogram/cystoscopy for bladder thickening  Please return if abdominal pain comes back, have fever or any other concerns

## 2022-04-07 NOTE — ED PROVIDER NOTES
EMERGENCY DEPARTMENT HISTORY AND PHYSICAL EXAM    11:51 AM  Date: 2022  Patient Name: Maik Garcia    History of Presenting Illness       History Provided By:     HPI: Maik Garcia is a 42 y.o. female with past medical hx as below including recent diagnosis (3/27/22) of 5 mm stone in the proximal left ureter at the ureteropelvic junction presents with left flank pain. Patient states that she was improving over the weekend but pain started becoming more intense yesterday. Patient is constant, severe, associated with some nausea, no modifying factors. Patient also reports some difficulty urinating. Denies any fever or chills.          PCP: Gerhard Silva MD    Past History     Past Medical History:  Past Medical History:   Diagnosis Date    Adverse effect of anesthesia     HX OF COLLAPSED LUNG    Benign tumor of cervix     Calculus of kidney     Elevated BP without diagnosis of hypertension     Endometriosis     Fracture of finger of right hand 2018    5th digit    H/O thoracic outlet syndrome     Headache     Hypertension     Ill-defined condition     OVARIAN CYST    Ill-defined condition     COLLAPSED LUNG    Migraine headache     Nausea & vomiting     Thyroid disease     hypothyroidism    Trauma        Past Surgical History:  Past Surgical History:   Procedure Laterality Date    HX BREAST REDUCTION      bilateral    HX  SECTION      x2    HX CHOLECYSTECTOMY      HX DILATION AND CURETTAGE      x3    HX HYSTERECTOMY  2015    HX OTHER SURGICAL  2017    REMOVED EXTRA RIB IN SPINE    HX PARTIAL HYSTERECTOMY      HX PELVIC LAPAROSCOPY      VASCULAR SURGERY PROCEDURE UNLIST         Family History:  Family History   Problem Relation Age of Onset   Garcia Migraines Mother     Hypertension Mother     Cancer Father     Heart Disease Father     Heart Attack Father     No Known Problems Sister     Diabetes Maternal Grandmother     Dementia Maternal Grandmother     No Known Problems Maternal Grandfather     No Known Problems Paternal Grandmother     No Known Problems Paternal Grandfather     Migraines Son     Migraines Daughter        Social History:  Social History     Tobacco Use    Smoking status: Never Smoker    Smokeless tobacco: Never Used   Vaping Use    Vaping Use: Never used   Substance Use Topics    Alcohol use: Yes     Comment: rare    Drug use: Never       Allergies: Allergies   Allergen Reactions    Codeine Hives, Itching, Swelling and Anaphylaxis    Emgality Pen [Galcanezumab-Gnlm] Rash    Norvasc [Amlodipine] Other (comments)     BLE edema    Shellfish Derived Shortness of Breath       Review of Systems   Review of Systems   Constitutional: Negative for activity change, appetite change and chills. HENT: Negative for congestion, ear discharge, ear pain and sore throat. Eyes: Negative for photophobia and pain. Respiratory: Negative for cough and choking. Cardiovascular: Negative for palpitations and leg swelling. Gastrointestinal: Positive for abdominal pain. Negative for anal bleeding and rectal pain. Endocrine: Negative for polydipsia and polyuria. Genitourinary: Negative for genital sores and urgency. Musculoskeletal: Negative for arthralgias and myalgias. Neurological: Negative for dizziness, seizures and speech difficulty. Psychiatric/Behavioral: Negative for hallucinations, self-injury and suicidal ideas. Physical Exam     Patient Vitals for the past 12 hrs:   Temp Pulse Resp BP SpO2   04/07/22 1149 97.9 °F (36.6 °C) 75 18 (!) 160/101 100 %       Physical Exam  Vitals and nursing note reviewed. Constitutional:       Appearance: She is well-developed. HENT:      Head: Normocephalic and atraumatic. Eyes:      General:         Right eye: No discharge. Left eye: No discharge. Cardiovascular:      Rate and Rhythm: Normal rate and regular rhythm. Heart sounds: Normal heart sounds.  No murmur heard. Pulmonary:      Effort: Pulmonary effort is normal. No respiratory distress. Breath sounds: Normal breath sounds. No stridor. No wheezing or rales. Chest:      Chest wall: No tenderness. Abdominal:      General: Bowel sounds are normal. There is no distension. Palpations: Abdomen is soft. Tenderness: There is no abdominal tenderness. There is no guarding or rebound. Comments: Left flank tenderness  Left lower abdominal tenderness   Musculoskeletal:         General: Normal range of motion. Cervical back: Normal range of motion and neck supple. Skin:     General: Skin is warm and dry. Neurological:      Mental Status: She is alert and oriented to person, place, and time. Diagnostic Study Results     Labs -  Recent Results (from the past 12 hour(s))   CBC WITH AUTOMATED DIFF    Collection Time: 04/07/22 12:05 PM   Result Value Ref Range    WBC 10.0 4.6 - 13.2 K/uL    RBC 4.19 (L) 4.20 - 5.30 M/uL    HGB 13.4 12.0 - 16.0 g/dL    HCT 39.2 35.0 - 45.0 %    MCV 93.6 78.0 - 100.0 FL    MCH 32.0 24.0 - 34.0 PG    MCHC 34.2 31.0 - 37.0 g/dL    RDW 12.5 11.6 - 14.5 %    PLATELET 729 (H) 393 - 420 K/uL    MPV 8.8 (L) 9.2 - 11.8 FL    NRBC 0.0 0  WBC    ABSOLUTE NRBC 0.00 0.00 - 0.01 K/uL    NEUTROPHILS 70 40 - 73 %    LYMPHOCYTES 19 (L) 21 - 52 %    MONOCYTES 7 3 - 10 %    EOSINOPHILS 4 0 - 5 %    BASOPHILS 1 0 - 2 %    IMMATURE GRANULOCYTES 0 0.0 - 0.5 %    ABS. NEUTROPHILS 7.0 1.8 - 8.0 K/UL    ABS. LYMPHOCYTES 1.9 0.9 - 3.6 K/UL    ABS. MONOCYTES 0.7 0.05 - 1.2 K/UL    ABS. EOSINOPHILS 0.4 0.0 - 0.4 K/UL    ABS. BASOPHILS 0.1 0.0 - 0.1 K/UL    ABS. IMM.  GRANS. 0.0 0.00 - 0.04 K/UL    DF AUTOMATED     METABOLIC PANEL, COMPREHENSIVE    Collection Time: 04/07/22 12:05 PM   Result Value Ref Range    Sodium 140 136 - 145 mmol/L    Potassium 3.4 (L) 3.5 - 5.5 mmol/L    Chloride 103 100 - 111 mmol/L    CO2 31 21 - 32 mmol/L    Anion gap 6 3.0 - 18 mmol/L    Glucose 107 (H) 74 - 99 mg/dL    BUN 10 7.0 - 18 MG/DL    Creatinine 0.67 0.6 - 1.3 MG/DL    BUN/Creatinine ratio 15 12 - 20      GFR est AA >60 >60 ml/min/1.73m2    GFR est non-AA >60 >60 ml/min/1.73m2    Calcium 9.3 8.5 - 10.1 MG/DL    Bilirubin, total 0.5 0.2 - 1.0 MG/DL    ALT (SGPT) 28 13 - 56 U/L    AST (SGOT) 15 10 - 38 U/L    Alk. phosphatase 59 45 - 117 U/L    Protein, total 7.4 6.4 - 8.2 g/dL    Albumin 3.5 3.4 - 5.0 g/dL    Globulin 3.9 2.0 - 4.0 g/dL    A-G Ratio 0.9 0.8 - 1.7     HCG QL SERUM    Collection Time: 04/07/22 12:05 PM   Result Value Ref Range    HCG, Ql. Negative NEG     URINALYSIS W/ RFLX MICROSCOPIC    Collection Time: 04/07/22 12:40 PM   Result Value Ref Range    Color YELLOW      Appearance CLEAR      Specific gravity <1.005 (L) 1.005 - 1.030    pH (UA) 7.5 5.0 - 8.0      Protein Negative NEG mg/dL    Glucose Negative NEG mg/dL    Ketone Negative NEG mg/dL    Bilirubin Negative NEG      Blood MODERATE (A) NEG      Urobilinogen 0.2 0.2 - 1.0 EU/dL    Nitrites Negative NEG      Leukocyte Esterase Negative NEG     URINE MICROSCOPIC ONLY    Collection Time: 04/07/22 12:40 PM   Result Value Ref Range    WBC 0 to 3 0 - 4 /hpf    RBC 0 to 3 0 - 5 /hpf    Epithelial cells FEW 0 - 5 /lpf    Bacteria FEW (A) NEG /hpf       Radiologic Studies -   CT ABD PELV WO CONT    Result Date: 4/7/2022  1. Developing left hydronephrosis and ureteral dilation without distinct ureteral mass or stone. Focal vague bladder wall thickening. Possibly from urothelial lesion. CT urogram may be of further diagnostic benefit. 2.  Punctate intrarenal stone. Medical Decision Making     ED Course: Progress Notes, Reevaluation, and Consults:    11:51 AM Initial assessment performed. The patients presenting problems have been discussed, and they/their family are in agreement with the care plan formulated and outlined with them. I have encouraged them to ask questions as they arise throughout their visit.       Provider Notes (Medical Decision Making):   Patient was recently diagnosed ureteric stone at the left UPJ presents with left lower quadrant pain radiating to her flank and difficulty urinating  55 ml in bladder scan   Left flank pain left lower quadrant tenderness on exam  Labs as interpreted by me: No leukocytosis   potassium 3.4 will be repleted  UA blood but no infection urine culture will be sent  Patient given IV fluids and antiemetics  Patient before going to CT went to the bathroom and felt pressure states that something came out of her urethra possibly a stone  CT abdomen pelvis left hydronephrosis and ureteral dilation without stone  Noted bladder wall thickening. Patient will need CT urogram as outpatient   Pain controlled  Most likely patient passed stone  discussed with the PA urology Radha Mcfarland will follow-up patient as outpatient for possible CT urogram and cystoscopy to rule out urothelial lesion          Vital Signs-Reviewed the patient's vital signs. Reviewed pt's pulse ox reading. Records Reviewed: old medical records  -I am the first provider for this patient.  -I reviewed the vital signs, available nursing notes, past medical history, past surgical history, family history and social history. Current Outpatient Medications   Medication Sig Dispense Refill    oxyCODONE-acetaminophen (Percocet) 5-325 mg per tablet Take 1 Tablet by mouth every six (6) hours as needed for Pain for up to 3 days. Max Daily Amount: 4 Tablets. Do not take tylenol with Norco since they both contain acetaminophen. 12 Tablet 0    ketorolac (TORADOL) 10 mg tablet Take 1 Tablet by mouth every six (6) hours as needed for Pain for up to 10 doses. 10 Tablet 0    tamsulosin (Flomax) 0.4 mg capsule Take 1 Capsule by mouth daily for 15 days. 15 Capsule 0    Nurtec ODT 75 mg disintegrating tablet TAKE 1 TAB BY MOUTH AS NEEDED FOR MIGRAINE.  MAX 1 DOSE IN 24 HOURS. 8 Tablet 0    metoprolol succinate (TOPROL-XL) 25 mg XL tablet Take 1 Tablet by mouth daily. 30 Tablet 5    TENS unit and electrodes (Cefaly) cmpk Cefaly dual. 1 Each 3        Clinical Impression     Clinical Impression:   1. Ureteric stone    2. Hydronephrosis, unspecified hydronephrosis type        Disposition: This note was dictated utilizing voice recognition software which may lead to typographical errors. I apologize in advance if the situation occurs. If questions arise please do not hesitate to contact me or call our department.     Livia Rutherford MD  11:51 AM

## 2022-04-08 LAB
BACTERIA SPEC CULT: NORMAL
CC UR VC: NORMAL
SERVICE CMNT-IMP: NORMAL

## 2022-04-11 DIAGNOSIS — G43.809 OTHER MIGRAINE WITHOUT STATUS MIGRAINOSUS, NOT INTRACTABLE: ICD-10-CM

## 2022-04-11 RX ORDER — RIMEGEPANT SULFATE 75 MG/75MG
TABLET, ORALLY DISINTEGRATING ORAL
Qty: 8 TABLET | Refills: 0 | Status: SHIPPED | OUTPATIENT
Start: 2022-04-11 | End: 2022-08-02 | Stop reason: SDUPTHER

## 2022-04-14 ENCOUNTER — HOSPITAL ENCOUNTER (OUTPATIENT)
Dept: PHYSICAL THERAPY | Age: 42
Discharge: HOME | End: 2022-04-14
Payer: COMMERCIAL

## 2022-04-14 PROCEDURE — 97110 THERAPEUTIC EXERCISES: CPT

## 2022-04-14 PROCEDURE — 97112 NEUROMUSCULAR REEDUCATION: CPT

## 2022-04-14 PROCEDURE — 97530 THERAPEUTIC ACTIVITIES: CPT

## 2022-04-14 NOTE — PROGRESS NOTES
PT DAILY TREATMENT NOTE    Patient Name: Elizabeth Feliciano  OLVA:[id number]  : 1980  [x]  Patient  Verified  Payor: Fabián Silver / Plan: [address] [place] [address] / Product Type: PPO /    In time:2:15  Out time:3:00  Total Treatment Time (min): 45  Total Timed Codes (min): 45  1:1 Treatment Time (MC/BCBS only): 45   Visit #: 10 of 14    Treatment Dx: Left hip pain [M25.552]    SUBJECTIVE  Pain Level (0-10 scale): 0  Any medication changes, allergies to medications, adverse drug reactions, diagnosis change, or new procedure performed?: [x] No    [] Yes (see summary sheet for update)  Subjective functional status/changes:   [] No changes reported  Pt reports she feels way better now that her kidney stones are resolved. She has been pain free in the hip for a while. OBJECTIVE    10 min Therapeutic Exercise:  [x] See flow sheet :   Rationale: increase strength to improve the patients ability to squat properly    15 min Therapeutic Activity:  []  See flow sheet :   Rationale: increase ROM, increase strength and improve coordination  to improve the patients ability to perform ADLs     20 min Neuromuscular Re-education:  [x]  See flow sheet :   Rationale: increase strength, improve coordination and increase proprioception  to improve the patients ability to activate glutes and stabilize pelvis. With   [] TE   [] TA   [] neuro   [] other: Patient Education: [x] Review HEP    [] Progressed/Changed HEP based on:   [x] positioning   [] body mechanics   [] transfers   [] heat/ice application    [] other:      Other Objective/Functional Measures: Adduction drop test: pt left leg came to midline at start of visit. Pain Level (0-10 scale) post treatment: 0    ASSESSMENT/Changes in Function: Pt with good tolerance to today's session. Pt reported she is still having tightness during performance of squats.  Pt with good tolerance to addition of hip IR and piriformis stretch in order to improve ROM for squatting mechanics. Pt is making great progress towards their goals. Pt will continue to benefit from skilled therapeutic intervention at this time to address th remaining deficits as discussed in goals below. Patient will continue to benefit from skilled PT services to modify and progress therapeutic interventions, address functional mobility deficits, address ROM deficits, address strength deficits, analyze and address soft tissue restrictions, analyze and cue movement patterns and analyze and modify body mechanics/ergonomics to attain remaining goals. [x]  See Plan of Care  []  See progress note/recertification  []  See Discharge Summary         Progress towards goals / Updated goals:  Short Term Goals: To be accomplished in 3 weeks:              1. Patient is compliant with daily HEP for prophylaxis and progress towards all goals  Status at Eval: HEP initiated  Current: compliance per pt report goal MET 3/18/22     2. Patient demonstrates negative Adduction drop test for both hips indicating improvement in lumbo-pelvic alignment and femoral acetabular mobility needed for gait  Status at Eval: bilateral positive ADT  Current status 3/24/22: ADT to midline post session, progressing     3. Improved trunk rotation to <or= 15 in bilaterally without indicating mobility needed to perform all ADL and ambulation  Status at Eval: trunk rotation limited to right 18 in, left 17.5 in, rib cage stiffness, shallow breathing pattern   Current 3/21/22: LTR: right = 15 inches, left 14.5 in - MET         Long Term Goals: To be accomplished in 6 weeks:              1.  Improved FOTO score to >or= 68/100 indicating improvement in overall function including increased tolerance to left S/L at night, increased ease getting in/out of car and for prolonged walking during ADL  Status at Eval: 57/100, pain with above activities  Current 4/14/22 88/100 MET     2. Patient demonstrates ability to perform YARON squat test >or= 3/5 with good postural alignment and ability to bring thighs to parallel position with floor indicating mobility and stability needed for ADL  Status at Eval: Squat test 2/5, limited range with testing , fwd WS with LS hyperlordosis  Current status 3/24/22: squat test 2/5 however improved control of PPT, progressing     3.  Patient demonstrates negative Gaudencio test and IR >or= 30 deg for left hip without pain at end range for functional mobility with ADL  Status at Eval: sharp pain with end range left hip IR in supine, + left Gaudencio test  Current status 3/14/22: left hip IR 30 deg without pain, negative Gaudencio test left, MET     4.Patient demonstrates ability to perform bridging x10 with good height and alternating leg kicks with good pelvic stability indicating functional hip strength  Status at Eval: challenged with full height bridging, lacking pelvic stability, strength deficit in hamstrings, gluts, ADD/ABD  Current status 3/24/22: demonstrates ability to perform full  bridging without LBP, MET       PLAN  []  Upgrade activities as tolerated     [x]  Continue plan of care  []  Update interventions per flow sheet       []  Discharge due to:_  []  Other:_      Kris Blount, PT 4/14/2022  2:12 PM    Future Appointments   Date Time Provider Finn Beth   4/14/2022  2:15 PM Amada Villalta, PT [facility]   4/19/2022  1:30 PM Isaiah Tierney, PT [facility]   4/27/2022  1:00 PM Shabbir Hector PT [facility]   5/2/2022 10:00 AM Isaiah Tierney PT [facility]   5/9/2022 10:00 AM Shabbir Hector PT [facility]   5/25/2022 11:05 AM IO LAB VISIT [facility] AM[facility]   6/1/2022  8:40 AM Alexa Dobson MD [facility] AM[facility]   2/8/2023 12:40 PM Marcus Cruz DO [facility] AMB

## 2022-04-19 ENCOUNTER — HOSPITAL ENCOUNTER (OUTPATIENT)
Dept: PHYSICAL THERAPY | Age: 42
Discharge: HOME | End: 2022-04-19
Payer: COMMERCIAL

## 2022-04-19 PROCEDURE — 97110 THERAPEUTIC EXERCISES: CPT

## 2022-04-19 PROCEDURE — 97112 NEUROMUSCULAR REEDUCATION: CPT

## 2022-04-19 NOTE — PROGRESS NOTES
PT DAILY TREATMENT NOTE    Patient Name: Rama Larkin  YFSW:[id number]  : 1980  [x]  Patient  Verified  Payor: [employer] [place] / Plan: [address] [place] [address] / Product Type: PPO /    In time:132  Out time:216  Total Treatment Time (min): 44  Total Timed Codes (min): 44  1:1 Treatment Time (MC/BCBS only): 44   Visit #: 11 of 14    Treatment Dx: Left hip pain [M25.552]    SUBJECTIVE  Pain Level (0-10 scale): 0  Any medication changes, allergies to medications, adverse drug reactions, diagnosis change, or new procedure performed?: [x] No    [] Yes (see summary sheet for update)  Subjective functional status/changes:   [] No changes reported  Reports no more hip pain    OBJECTIVE        18 min Therapeutic Exercise:  [] See flow sheet :   Rationale: increase ROM, increase strength and improve coordination to improve the patients ability to perform ADL       26 min Neuromuscular Re-education:  [x]  See flow sheet :pelvic repositioning, focus on left abs/left lateralization   Rationale: increase ROM, increase strength, improve coordination and increase proprioception  to improve the patients ability to perform ADL      With   [x] TE   [] TA   [] neuro   [] other: Patient Education: [x] Review HEP    [] Progressed/Changed HEP based on:   [] positioning   [] body mechanics   [] transfers   [] heat/ice application    [] other:      Other Objective/Functional Measures:   ADT left past midline at beginning of session, post session negative  Squat test 2/5     Pain Level (0-10 scale) post treatment: 0    ASSESSMENT/Changes in Function: challenged with left SLS during right alternating steps, also mild knee pain with unsupported squatting due to quad overuse, significant progress in overall function and pain, no pain with left hip ROM    Patient will continue to benefit from skilled PT services to address ROM deficits, address strength deficits, analyze and address soft tissue restrictions and analyze and cue movement patterns to attain remaining goals. [x]  See Plan of Care  []  See progress note/recertification  []  See Discharge Summary               Progress towards goals / Updated goals:  Short Term Goals: To be accomplished in 3 weeks:              1. Patient is compliant with daily HEP for prophylaxis and progress towards all goals  Status at Eval: HEP initiated  Current: compliance per pt report goal MET 3/18/22     2. Patient demonstrates negative Adduction drop test for both hips indicating improvement in lumbo-pelvic alignment and femoral acetabular mobility needed for gait  Status at Eval: bilateral positive ADT  Current status 4/19/22: ADT past midline at beginning of session,cleared post session,  progressing     3. Improved trunk rotation to <or= 15 in bilaterally without indicating mobility needed to perform all ADL and ambulation  Status at Eval: trunk rotation limited to right 18 in, left 17.5 in, rib cage stiffness, shallow breathing pattern   Current 3/21/22: LTR: right = 15 inches, left 14.5 in - MET         Long Term Goals: To be accomplished in 6 weeks:              1. Improved FOTO score to >or= 68/100 indicating improvement in overall function including increased tolerance to left S/L at night, increased ease getting in/out of car and for prolonged walking during ADL  Status at Eval: 57/100, pain with above activities  Current 4/14/22 88/100 MET     2. Patient demonstrates ability to perform YARON squat test >or= 3/5 with good postural alignment and ability to bring thighs to parallel position with floor indicating mobility and stability needed for ADL  Status at Eval: Squat test 2/5, limited range with testing , fwd WS with LS hyperlordosis  Current status 4/19/22: squat test 2/5 however improved control of PPT, progressing     3.  Patient demonstrates negative Gaudencio test and IR >or= 30 deg for left hip without pain at end range for functional mobility with ADL  Status at Eval: sharp pain with end range left hip IR in supine, + left Gaudencio test  Current status 3/14/22: left hip IR 30 deg without pain, negative Gaudencio test left, MET     4.Patient demonstrates ability to perform bridging x10 with good height and alternating leg kicks with good pelvic stability indicating functional hip strength  Status at Eval: challenged with full height bridging, lacking pelvic stability, strength deficit in hamstrings, gluts, ADD/ABD  Current status 3/24/22: demonstrates ability to perform full  bridging without LBP, MET       PLAN  []  Upgrade activities as tolerated     [x]  Continue plan of care  []  Update interventions per flow sheet       []  Discharge due to:_  []  Other:_      Ney Nino, PT 4/19/2022  1:34 PM    Future Appointments   Date Time Provider Finn Beth   4/27/2022  1:00 PM Kellee Joaquin, PT [facility]   5/2/2022 10:00 AM RUDDY GarciaHPNAWAF [facility]   5/9/2022 10:00 AM RUDDY Okeefe THE [facility]   5/25/2022 11:05 AM I[facility] LAB VISIT [facility]   6/1/2022  8:40 AM Cesar Schmid MD [facility]   2/8/2023 12:40 PM Roman Gutierrez DO [facility] AMB

## 2022-04-27 ENCOUNTER — HOSPITAL ENCOUNTER (OUTPATIENT)
Dept: PHYSICAL THERAPY | Age: 42
Discharge: HOME | End: 2022-04-27
Payer: COMMERCIAL

## 2022-04-27 PROCEDURE — 97110 THERAPEUTIC EXERCISES: CPT

## 2022-04-27 PROCEDURE — 97112 NEUROMUSCULAR REEDUCATION: CPT

## 2022-04-27 NOTE — PROGRESS NOTES
PT DAILY TREATMENT NOTE    Patient Name: Frank Galindo  Date:2022  : 1980  [x]  Patient  Verified  Payor: Cara Patterson / Plan: [address] [place] [address] / Product Type: PPO /    In time:1:07pm  Out time:1:57  Total Treatment Time (min): 50  Total Timed Codes (min): 40  1:1 Treatment Time (MC/BCBS only): 40   Visit #: 12 of 14    Treatment Dx: Left hip pain [M25.552]    SUBJECTIVE  Pain Level (0-10 scale): 0  Any medication changes, allergies to medications, adverse drug reactions, diagnosis change, or new procedure performed?: [x] No    [] Yes (see summary sheet for update)  Subjective functional status/changes:   [] No changes reported  The pt reported feeling sore after last treatment session. Pt reported that     OBJECTIVE    33 min Therapeutic Exercise:  [x] See flow sheet :   Rationale: increase ROM, increase strength, improve coordination, improve balance and increase proprioception to improve the patients ability to perform daily activities with decreased pain and symptom levels     17 min Neuromuscular Re-education:  [x]  See flow sheet :   Rationale: increase ROM, increase strength, improve coordination, improve balance and increase proprioception  to improve the patients ability to perform daily activities with decreased pain and symptom levels              With   [] TE   [] TA   [] neuro   [] other: Patient Education: [x] Review HEP    [] Progressed/Changed HEP based on:   [] positioning   [] body mechanics   [] transfers   [] heat/ice application    [] other:      Other Objective/Functional Measures: pt was challenged with YARON right glut max      Pain Level (0-10 scale) post treatment: 0    ASSESSMENT/Changes in Function: The pt reported to therapy with a pleasant attitude and ready to participate in therapeutic exercises. Patient tolerated treatment session well today.  Patient had no complaints with addition of sled push to exercise program to accomplish an increase in functional LE strength. Patient continues to make good progress toward goals and would benefit from continued skilled PT intervention to address remaining deficits outlined in goals below. Patient will continue to benefit from skilled PT services to modify and progress therapeutic interventions, address functional mobility deficits, address ROM deficits, address strength deficits, analyze and address soft tissue restrictions, analyze and cue movement patterns, analyze and modify body mechanics/ergonomics, assess and modify postural abnormalities and address imbalance/dizziness to attain remaining goals. [x]  See Plan of Care  []  See progress note/recertification  []  See Discharge Summary         Progress towards goals / Updated goals:  Short Term Goals: To be accomplished in 3 weeks:              1. Patient is compliant with daily HEP for prophylaxis and progress towards all goals  Status at Eval: HEP initiated  Current: compliance per pt report goal MET 3/18/22     2. Patient demonstrates negative Adduction drop test for both hips indicating improvement in lumbo-pelvic alignment and femoral acetabular mobility needed for gait  Status at Eval: bilateral positive ADT  Current status 4/19/22: ADT past midline at beginning of session,cleared post session,  progressing     3. Improved trunk rotation to <or= 15 in bilaterally without indicating mobility needed to perform all ADL and ambulation  Status at Eval: trunk rotation limited to right 18 in, left 17.5 in, rib cage stiffness, shallow breathing pattern   Current 3/21/22: LTR: right = 15 inches, left 14.5 in - MET         Long Term Goals: To be accomplished in 6 weeks:              1.  Improved FOTO score to >or= 68/100 indicating improvement in overall function including increased tolerance to left S/L at night, increased ease getting in/out of car and for prolonged walking during ADL  Status at Eval: 57/100, pain with above activities  Current 4/14/22 88/100 MET     2. Patient demonstrates ability to perform YARON squat test >or= 3/5 with good postural alignment and ability to bring thighs to parallel position with floor indicating mobility and stability needed for ADL  Status at Eval: Squat test 2/5, limited range with testing , fwd WS with LS hyperlordosis  Current status 4/19/22: squat test 2/5 however improved control of PPT, progressing     3.  Patient demonstrates negative Gaudencio test and IR >or= 30 deg for left hip without pain at end range for functional mobility with ADL  Status at Eval: sharp pain with end range left hip IR in supine, + left Gaudencio test  Current status 3/14/22: left hip IR 30 deg without pain, negative Gaudencio test left, MET     4.Patient demonstrates ability to perform bridging x10 with good height and alternating leg kicks with good pelvic stability indicating functional hip strength  Status at Eval: challenged with full height bridging, lacking pelvic stability, strength deficit in hamstrings, gluts, ADD/ABD  Current status 3/24/22: demonstrates ability to perform full  bridging without LBP, MET    PLAN  []  Upgrade activities as tolerated     [x]  Continue plan of care  []  Update interventions per flow sheet       []  Discharge due to:_  []  Other:_      Jonna Oas, PT 4/27/2022  1:52 PM    Future Appointments   Date Time Provider [place]   5/2/2022 10:00 AM Jaison Rawls, PT [facility]   5/9/2022 10:00 AM Elfego Madrigal PT [facility]   5/25/2022 11:05 AM IO LAB VISIT [facility] BS AMB   6/1/2022  8:40 AM Taylor Cortes MD [facility] BS AMB   2/8/2023 12:40 PM Cherise Tilley DO [facility] BS AMB

## 2022-05-02 ENCOUNTER — HOSPITAL ENCOUNTER (OUTPATIENT)
Dept: PHYSICAL THERAPY | Age: 42
Discharge: HOME | End: 2022-05-02
Payer: COMMERCIAL

## 2022-05-02 PROCEDURE — 97530 THERAPEUTIC ACTIVITIES: CPT

## 2022-05-02 PROCEDURE — 97112 NEUROMUSCULAR REEDUCATION: CPT

## 2022-05-02 PROCEDURE — 97110 THERAPEUTIC EXERCISES: CPT

## 2022-05-02 NOTE — PROGRESS NOTES
PT DAILY TREATMENT NOTE    Patient Name: Rama Larkin  Date:2022  : 1980  [x]  Patient  Verified  Payor: SEBAS VALENTINO / Plan: [address] [place] [address] / Product Type: PPO /    In time:1005  Out time:1050  Total Treatment Time (min): 45  Total Timed Codes (min): 45  1:1 Treatment Time (MC/BCBS only): 45   Visit #: 13 of 13 + 8    Treatment Dx: Left hip pain [M25.552]    SUBJECTIVE  Pain Level (0-10 scale): 0  Any medication changes, allergies to medications, adverse drug reactions, diagnosis change, or new procedure performed?: [x] No    [] Yes (see summary sheet for update)  Subjective functional status/changes:   [] No changes reported  Complete alleviation of left hip pain.  Reports occasional knee pain left    OBJECTIVE    10 min Therapeutic Exercise:  [x] See flow sheet :   Rationale: increase ROM, increase strength and improve coordination to improve the patients ability to perform ADL    15 min Therapeutic Activity:  [x]  See flow sheet :[email or web address], discussed POC   Rationale: establish current status and needs for additional therapy  to improve the patients ability to perform ADL     20 min Neuromuscular Re-education:  [x]  See flow sheet :focus on pelvic stability, facilitation left ADD and HS mm   Rationale: increase strength, improve coordination and increase proprioception  to improve the patients ability to perform ADL              With   [] TE   [x] TA   [] neuro   [] other: Patient Education: [x] Review HEP    [] Progressed/Changed HEP based on:   [] positioning   [] body mechanics   [] transfers   [] heat/ice application    [] other:      Other Objective/Functional Measures:   ADT both neg  ADD lift test both 1/5  Intermittent knee pain jhony in distal quads      Pain Level (0-10 scale) post treatment: 0    ASSESSMENT/Changes in Function: patient has shown nice progress, alleviation of left hip pain with ADL and improved measurements indicating better pelvic alignment however continues with strength deficit in ADD both sides indicating lack of pelvic stability    Patient will continue to benefit from skilled PT services to address strength deficits and analyze and cue movement patterns to attain remaining goals. [x]  See Plan of Care  []  See progress note/recertification  []  See Discharge Summary         Progress towards goals / Updated goals:  Short Term Goals: To be accomplished in 3 weeks:              1. Patient is compliant with daily HEP for prophylaxis and progress towards all goals  Status at Eval: HEP initiated  Current: compliance per pt report goal MET 3/18/22     2. Patient demonstrates negative Adduction drop test for both hips indicating improvement in lumbo-pelvic alignment and femoral acetabular mobility needed for gait  Status at Eval: bilateral positive ADT  Current status 4/19/22: ADT past midline at beginning of session,cleared post session,  progressing  Status on 5/2/22: ADT negative at beginning of session, MET     3. Improved trunk rotation to <or= 15 in bilaterally without indicating mobility needed to perform all ADL and ambulation  Status at Eval: trunk rotation limited to right 18 in, left 17.5 in, rib cage stiffness, shallow breathing pattern   Current 3/21/22: LTR: right = 15 inches, left 14.5 in - MET         Long Term Goals: To be accomplished in 6 weeks:              1.  Improved FOTO score to >or= 68/100 indicating improvement in overall function including increased tolerance to left S/L at night, increased ease getting in/out of car and for prolonged walking during ADL  Status at Eval: 57/100, pain with above activities  Current 4/14/22 88/100 MET     2. Patient demonstrates ability to perform YARON squat test >or= 3/5 with good postural alignment and ability to bring thighs to parallel position with floor indicating mobility and stability needed for ADL  Status at Eval: Squat test 2/5, limited range with testing , fwd WS with LS hyperlordosis  Current status 4/19/22: squat test 2/5 however improved control of PPT, progressing     3. Patient demonstrates negative Gaudencio test and IR >or= 30 deg for left hip without pain at end range for functional mobility with ADL  Status at Eval: sharp pain with end range left hip IR in supine, + left Gaudencio test  Current status 3/14/22: left hip IR 30 deg without pain, negative Gaudencio test left, MET     4.Patient demonstrates ability to perform bridging x10 with good height and alternating leg kicks with good pelvic stability indicating functional hip strength  Status at Eval: challenged with full height bridging, lacking pelvic stability, strength deficit in hamstrings, gluts, ADD/ABD  Current s tatus 3/24/22: demonstrates ability to perform full  bridging without LBP, MET    Updated goal as of 5/2/22:  5.  Patient demonstrates improved ADD lift test to >or= 3/10 indicating stability needed for gait and dynamic ADL  Status on 5/2/22: ADD lift test 1/5       PLAN  []  Upgrade activities as tolerated     [x]  Continue plan of care  []  Update interventions per flow sheet       []  Discharge due to:_  []  Other:_      Anthony Days, PT 5/2/2022  10:12 AM    Future Appointments   Date Time Provider [place]   5/9/2022 10:00 AM Yoly Cardona PT [facility]   5/25/2022 11:05 AM [facility] LAB VISIT [facility] BS AMB   6/1/2022  8:40 AM Antoine Vasquez MD [facility] BS AMB   2/8/2023 12:40 PM Dylan Jaramillo DO [facility] BS AMB

## 2022-05-02 NOTE — PROGRESS NOTES
[facility] at the [address], [place], [address] [facility]  Phone: [phone]      Fax:  [phone]    Progress Note    Patient name: Heather Cruz Start of Care: 2022   Referral source: Joey Gonzalez MD : 1980               Medical Diagnosis: Left hip pain [M25.552]    Onset Date:2020               Treatment Diagnosis: left hip pain   Prior Hospitalization: see medical history Provider#: [id number]   Medications: Verified on Patient summary List    Comorbidities: s/p right ankle sprain, chronic LBP/CS pain left, sleep apnea, s/p removal of left first rib   Prior Level of Function: Full function without hip pain prior to fall/ankle sprain in       Visits from Start of Care: 13    Missed Visits: 0    Progress towards goals / Updated goals:  Short Term Goals: To be accomplished in 3 weeks:              1. Patient is compliant with daily HEP for prophylaxis and progress towards all goals  Status at Eval: HEP initiated  Current: compliance per pt report goal MET 3/18/22     2. Patient demonstrates negative Adduction drop test for both hips indicating improvement in lumbo-pelvic alignment and femoral acetabular mobility needed for gait  Status at Eval: bilateral positive ADT  Current status 22: ADT past midline at beginning of session,cleared post session,  progressing  Status on 22: ADT negative at beginning of session, MET     3. Improved trunk rotation to <or= 15 in bilaterally without indicating mobility needed to perform all ADL and ambulation  Status at Eval: trunk rotation limited to right 18 in, left 17.5 in, rib cage stiffness, shallow breathing pattern   Current 3/21/22: LTR: right = 15 inches, left 14.5 in - MET         Long Term Goals: To be accomplished in 6 weeks:              1.  Improved FOTO score to >or= 68/100 indicating improvement in overall function including increased tolerance to left S/L at night, increased ease getting in/out of car and for prolonged walking during ADL  Status at Eval: 57/100, pain with above activities  Current 4/14/22 88/100 MET     2. Patient demonstrates ability to perform YARON squat test >or= 3/5 with good postural alignment and ability to bring thighs to parallel position with floor indicating mobility and stability needed for ADL  Status at Eval: Squat test 2/5, limited range with testing , fwd WS with LS hyperlordosis  Current status 4/19/22: squat test 2/5 however improved control of PPT, progressing     3. Patient demonstrates negative Gaudencio test and IR >or= 30 deg for left hip without pain at end range for functional mobility with ADL  Status at Eval: sharp pain with end range left hip IR in supine, + left Gaudencio test  Current status 3/14/22: left hip IR 30 deg without pain, negative Gaudencio test left, MET     4.Patient demonstrates ability to perform bridging x10 with good height and alternating leg kicks with good pelvic stability indicating functional hip strength  Status at Eval: challenged with full height bridging, lacking pelvic stability, strength deficit in hamstrings, gluts, ADD/ABD  Current s tatus 3/24/22: demonstrates ability to perform full  bridging without LBP, MET    Updated goal as of 5/2/22:  5. Patient demonstrates improved ADD lift test to >or= 3/10 indicating stability needed for gait and dynamic ADL  Status on 5/2/22: ADD lift test 1/5       Key Functional Changes: no hip pain with ADL  Updated Goals: to be achieved in 4 weeks:   Updated goal as above  ASSESSMENT/RECOMMENDATIONS:This reevaluation was performed at my earliest convenience. Ms. Jhony Choudhury has attended 13 sessions for left hip pain and has shown excellent improvement . She currently reports complete alleviation of left hip pain and has met multiple goals. She continues with deficit in hip ADD strength needed for return to full function.  I recommend to continue with current treatment to address remaining goals.    [x]Continue therapy per initial plan/protocol at a frequency of  2 x per week for 4 weeks    Thank you for this referral.   Emi Del Valle, PT 5/2/2022 10:38 AM

## 2022-05-09 ENCOUNTER — TELEPHONE (OUTPATIENT)
Dept: PHYSICAL THERAPY | Age: 42
End: 2022-05-09

## 2022-05-09 ENCOUNTER — APPOINTMENT (OUTPATIENT)
Dept: PHYSICAL THERAPY | Age: 42
End: 2022-05-09
Payer: COMMERCIAL

## 2022-05-18 ENCOUNTER — HOSPITAL ENCOUNTER (OUTPATIENT)
Dept: PHYSICAL THERAPY | Age: 42
Discharge: HOME | End: 2022-05-18
Payer: COMMERCIAL

## 2022-05-18 PROCEDURE — 97530 THERAPEUTIC ACTIVITIES: CPT

## 2022-05-18 PROCEDURE — 97112 NEUROMUSCULAR REEDUCATION: CPT

## 2022-05-18 PROCEDURE — 97110 THERAPEUTIC EXERCISES: CPT

## 2022-05-18 NOTE — PROGRESS NOTES
[facility] at the [address], [facility], [address] [facility]  Phone: [phone]      Fax:  [phone]    Progress Note  Patient name: Darryl Rajan Start of Care: 2022   Referral source: Juany Blanc MD : 1980   Medical/Treatment Diagnosis: Left hip pain [M25.552] Onset Date:2020     Prior Hospitalization: see medical history Provider#: [id number]   Medications: Verified on Patient Summary List    Comorbidities: s/p right ankle sprain, chronic LBP/CS pain left, sleep apnea, s/p removal of left first rib   Prior Level of Function: Full function without hip pain prior to fall/ankle sprain in     Visits from Start of Care: 14   Missed Visits: 0    Progress Towards Goals: Short Term Goals: To be accomplished in 3 weeks:              1. Patient is compliant with daily HEP for prophylaxis and progress towards all goals  Status at Eval: HEP initiated  Current: compliance per pt report goal MET 3/18/22     2. Patient demonstrates negative Adduction drop test for both hips indicating improvement in lumbo-pelvic alignment and femoral acetabular mobility needed for gait  Status at Eval: bilateral positive ADT  Current status 22: ADT past midline at beginning of session,cleared post session,  progressing  Status on 22: ADT negative at beginning of session, MET     3. Improved trunk rotation to <or= 15 in bilaterally without indicating mobility needed to perform all ADL and ambulation  Status at Eval: trunk rotation limited to right 18 in, left 17.5 in, rib cage stiffness, shallow breathing pattern   Current 3/21/22: LTR: right = 15 inches, left 14.5 in - MET         Long Term Goals: To be accomplished in 6 weeks:              1.  Improved FOTO score to >or= 68/100 indicating improvement in overall function including increased tolerance to left S/L at night, increased ease getting in/out of car and for prolonged walking during ADL  Status at Eval: 57/100, pain with above activities  Current 4/14/22 88/100 MET     2. Patient demonstrates ability to perform YARON squat test >or= 3/5 with good postural alignment and ability to bring thighs to parallel position with floor indicating mobility and stability needed for ADL  Status at Eval: Squat test 2/5, limited range with testing , fwd WS with LS hyperlordosis  Current status 4/19/22: squat test 2/5 however improved control of PPT, progressing  Current 5/18/22: squat test 2+/5, demonstrate right foot supination. IN PROGRESS     3. Patient demonstrates negative Gaudencio test and IR >or= 30 deg for left hip without pain at end range for functional mobility with ADL  Status at Eval: sharp pain with end range left hip IR in supine, + left Gaudencio test  Current status 3/14/22: left hip IR 30 deg without pain, negative Gaudencio test left, MET     4.Patient demonstrates ability to perform bridging x10 with good height and alternating leg kicks with good pelvic stability indicating functional hip strength  Status at Eval: challenged with full height bridging, lacking pelvic stability, strength deficit in hamstrings, gluts, ADD/ABD  Current s tatus 3/24/22: demonstrates ability to perform full  bridging without LBP, MET     Updated goal as of 5/2/22:  5. Patient demonstrates improved ADD lift test to >or= 3/10 indicating stability needed for gait and dynamic ADL  Status on 5/2/22: ADD lift test 1/5  CURRENT 5/18/22:  Right and Left ADD  4/5. MET        Key Functional Changes:  Mrs. Jess Meza has attended 14 PT session including initial evaluation to focus on postural alignment, pelvic/rib cage repositioning, neuromuscular reeducation, and modalities. PT has help pt to improved with pain management, mobility, and ADLs Pt continues to have difficulty with bike riding and running due to left hip pain.  Patient continues to make steady progress toward goals and would benefit from continued skilled PT intervention to address remaining deficits outlined in goals below.     Updated Goals: to be achieved in 8 weeks:     ASSESSMENT/RECOMMENDATIONS:  [x]Continue therapy per initial plan/protocol at a frequency of  2 x per week for 8 weeks  []Continue therapy with the following recommended changes:_____________________      _____________________________________________________________________  []Discontinue therapy progressing towards or have reached established goals  []Discontinue therapy due to lack of appreciable progress towards goals  []Discontinue therapy due to lack of attendance or compliance  []Await Physician's recommendations/decisions regarding therapy  []Other:________________________________________________________________    Thank you for this referral.   Russ Durham PTA 5/18/2022 11:30 AM

## 2022-05-18 NOTE — PROGRESS NOTES
PT DAILY TREATMENT NOTE    Patient Name: Vandana Hicks  Date:2022  : 1980  [x]  Patient  Verified  Payor: SEBAS VALENTINO / Plan: [address] [place] [address] Avenue / Product Type: PPO /    In time:1047  Out time:1128  Total Treatment Time (min): 41  Total Timed Codes (min): 41  1:1 Treatment Time (MC/BCBS only): 41   Visit #: 14 of 21    Treatment Dx: Left hip pain [M25.552]    SUBJECTIVE  Pain Level (0-10 scale): 0  Any medication changes, allergies to medications, adverse drug reactions, diagnosis change, or new procedure performed?: [x] No    [] Yes (see summary sheet for update)  Subjective functional status/changes:   [] No changes reported  Pt reports of no changes since last visit. OBJECTIVE    20 min Therapeutic Exercise:  [x] See flow sheet :   Rationale: increase ROM, increase strength, improve coordination and improve balance to improve the patients ability to return PLOF    11 min Therapeutic Activity:  [x]  See flow sheet :   Rationale: increase ROM, increase strength, improve coordination and improve balance  to improve the patients ability to perform ADLs     10 min Neuromuscular Re-education:  [x]  See flow sheet :   Rationale: increase ROM, increase strength, improve coordination and improve balance  to improve the patients ability to perform ADLs    With   [] TE   [] TA   [] neuro   [] other: Patient Education: [x] Review HEP    [] Progressed/Changed HEP based on:   [] positioning   [] body mechanics   [] transfers   [] heat/ice application    [] other:      Other Objective/Functional Measures:    -short stride with sled push    Pain Level (0-10 scale) post treatment: 0/10    ASSESSMENT/Changes in Function:  Pt perform well with exercise program today. She have no complaint with exercise program to improve postural alignment and increasing LE strength. PT has help pt to improved with pain management, mobility, and ADLs Pt continues to have difficulty with bike riding and running due to left hip pain. Patient continues to make steady progress toward goals and would benefit from continued skilled PT intervention to address remaining deficits outlined in goals below. Patient will continue to benefit from skilled PT services to modify and progress therapeutic interventions, address functional mobility deficits, address ROM deficits, address strength deficits, analyze and address soft tissue restrictions, analyze and cue movement patterns, analyze and modify body mechanics/ergonomics and assess and modify postural abnormalities to attain remaining goals. [x]  See Plan of Care  [x]  See progress note/recertification  []  See Discharge Summary         Progress towards goals / Updated goals:  Short Term Goals: To be accomplished in 3 weeks:              1. Patient is compliant with daily HEP for prophylaxis and progress towards all goals  Status at Eval: HEP initiated  Last PN: compliance per pt report goal MET 3/18/22     2. Patient demonstrates negative Adduction drop test for both hips indicating improvement in lumbo-pelvic alignment and femoral acetabular mobility needed for gait  Status at Eval: bilateral positive ADT  Last PN 5/2/22: ADT negative at beginning of session, MET     3. Improved trunk rotation to <or= 15 in bilaterally without indicating mobility needed to perform all ADL and ambulation  Status at Eval: trunk rotation limited to right 18 in, left 17.5 in, rib cage stiffness, shallow breathing pattern   Last PN 3/21/22: LTR: right = 15 inches, left 14.5 in - MET         Long Term Goals: To be accomplished in 6 weeks:              1.  Improved FOTO score to >or= 68/100 indicating improvement in overall function including increased tolerance to left S/L at night, increased ease getting in/out of car and for prolonged walking during ADL  Status at Eval: 57/100, pain with above activities  Last PN 4/14/22 88/100 MET     2. Patient demonstrates ability to perform YARON squat test >or= 3/5 with good postural alignment and ability to bring thighs to parallel position with floor indicating mobility and stability needed for ADL  Status at Eval: Squat test 2/5, limited range with testing , fwd WS with LS hyperlordosis  Last PN: 4/19/22: squat test 2/5 however improved control of PPT, progressing  Current 5/18/22: squat test 2+/5, demonstrate right foot supination. IN PROGRESS     3. Patient demonstrates negative Gaudencio test and IR >or= 30 deg for left hip without pain at end range for functional mobility with ADL  Status at Eval: sharp pain with end range left hip IR in supine, + left Gaudencio test  Last PN3/14/22: left hip IR 30 deg without pain, negative Gaudencio test left, MET     4.Patient demonstrates ability to perform bridging x10 with good height and alternating leg kicks with good pelvic stability indicating functional hip strength  Status at Eval: challenged with full height bridging, lacking pelvic stability, strength deficit in hamstrings, gluts, ADD/ABD  Last PN 3/24/22: demonstrates ability to perform full  bridging without LBP, MET     Updated goal as of 5/2/22:  5.  Patient demonstrates improved ADD lift test to >or= 3/10 indicating stability needed for gait and dynamic ADL  Status on 5/2/22: ADD lift test 1/5  Current:    PLAN    []  Upgrade activities as tolerated     [x]  Continue plan of care  []  Update interventions per flow sheet       []  Discharge due to:_  []  Other:_      Immanuel Fernández PTA 5/18/2022  9:40 AM    Future Appointments   Date Time Provider Finn Beth   5/18/2022 10:45 AM CHRIS Sánchez THE [facility]   5/20/2022  8:30 AM CHRIS Sánchez THE [facility]   5/25/2022 11:05 AM IOC LAB VISIT IO BS AMB   5/25/2022  1:30 PM RUDDY Meyers Junior THE [facility]   6/1/2022  8:40 AM Tito Vicente MD [facility] BS AMB   6/1/2022 11:30 AM RUDDY Nicole THE [facility]   6/2/2022 11:30 AM RUDDY Nicole THE [facility]   6/6/2022 10:00 AM RUDDY Nicole THE [facility]   6/8/2022 10:00 AM Taylor Mt, PT MIHPTBW THE [facility]   2/8/2023 12:40 PM Dianna Yarbrough DO [facility] BS AMB

## 2022-05-20 ENCOUNTER — HOSPITAL ENCOUNTER (OUTPATIENT)
Dept: PHYSICAL THERAPY | Age: 42
Discharge: HOME | End: 2022-05-20
Payer: COMMERCIAL

## 2022-05-20 PROCEDURE — 97112 NEUROMUSCULAR REEDUCATION: CPT

## 2022-05-20 PROCEDURE — 97110 THERAPEUTIC EXERCISES: CPT

## 2022-05-20 PROCEDURE — 97530 THERAPEUTIC ACTIVITIES: CPT

## 2022-05-20 NOTE — PROGRESS NOTES
PT DAILY TREATMENT NOTE    Patient Name: Darryl Rajan  Date:2022  : 1980  [x]  Patient  Verified  Payor: BLUE CROSS / Plan: [address] [place] [address] Avenue / Product Type: PPO /    In time:818  Out time:903  Total Treatment Time (min): 45  Total Timed Codes (min): 45  1:1 Treatment Time (MC/BCBS only): 45   Visit #: 15 of 21    Treatment Dx: Left hip pain [M25.552]    SUBJECTIVE  Pain Level (0-10 scale): 0/10  Any medication changes, allergies to medications, adverse drug reactions, diagnosis change, or new procedure performed?: [x] No    [] Yes (see summary sheet for update)  Subjective functional status/changes:   [x] No changes reported  Pt reports of no changes since last visit. OBJECTIVE    15 min Therapeutic Exercise:  [x] See flow sheet :   Rationale: increase ROM, increase strength, improve coordination and improve balance to improve the patients ability to return PLOF    15 min Therapeutic Activity:  [x]  See flow sheet :   Rationale: increase ROM, increase strength, improve coordination and improve balance  to improve the patients ability to perform ADLs     15 min Neuromuscular Re-education:  [x]  See flow sheet :   Rationale: increase ROM, increase strength, improve coordination and improve balance  to improve the patients ability to perform ADLs     With   [] TE   [] TA   [] neuro   [] other: Patient Education: [x] Review HEP    [] Progressed/Changed HEP based on:   [] positioning   [x] body mechanics   [] transfers   [] heat/ice application    [] other:      Other Objective/Functional Measures:    -10x squats with good postural form with right knee instability. Pain Level (0-10 scale) post treatment: 0/10    ASSESSMENT/Changes in Function:   Patient tolerated treatment session well today. Pt was able to improve with squats with good postural form and thighs parallel with floor but demonstrate right knee instability.  Patient continues to make steady progress toward goals and would benefit from continued skilled PT intervention to address remaining deficits outlined in goals below. Patient will continue to benefit from skilled PT services to modify and progress therapeutic interventions, address functional mobility deficits, address ROM deficits, address strength deficits, analyze and address soft tissue restrictions, analyze and cue movement patterns, analyze and modify body mechanics/ergonomics and assess and modify postural abnormalities to attain remaining goals. [x]  See Plan of Care  []  See progress note/recertification  []  See Discharge Summary         Progress towards goals / Updated goals:  Short Term Goals: To be accomplished in 3 weeks:              1. Patient is compliant with daily HEP for prophylaxis and progress towards all goals  Status at Eval: HEP initiated  Last PN: compliance per pt report goal MET 3/18/22     2. Patient demonstrates negative Adduction drop test for both hips indicating improvement in lumbo-pelvic alignment and femoral acetabular mobility needed for gait  Status at Eval: bilateral positive ADT  Last PN 5/2/22: ADT negative at beginning of session, MET     3. Improved trunk rotation to <or= 15 in bilaterally without indicating mobility needed to perform all ADL and ambulation  Status at Eval: trunk rotation limited to right 18 in, left 17.5 in, rib cage stiffness, shallow breathing pattern   Last PN 3/21/22: LTR: right = 15 inches, left 14.5 in - MET         Long Term Goals: To be accomplished in 6 weeks:              1.  Improved FOTO score to >or= 68/100 indicating improvement in overall function including increased tolerance to left S/L at night, increased ease getting in/out of car and for prolonged walking during ADL  Status at Eval: 57/100, pain with above activities  Last PN 4/14/22 88/100 MET     2. Patient demonstrates ability to perform YARON squat test >or= 3/5 with good postural alignment and ability to bring thighs to parallel position with floor indicating mobility and stability needed for ADL  Status at Eval: Squat test 2/5, limited range with testing , fwd WS with LS hyperlordosis  Last PN: 4/19/22: squat test 2/5 however improved control of PPT, progressing  Current 5/20/22: squat test 3/5 with good postural form parallel with floor, slight right knee instability. IN PROGRESS    3. Patient demonstrates negative Gaudencio test and IR >or= 30 deg for left hip without pain at end range for functional mobility with ADL  Status at Eval: sharp pain with end range left hip IR in supine, + left Gaudencio test  Last PN3/14/22: left hip IR 30 deg without pain, negative Gaudencio test left, MET     4.Patient demonstrates ability to perform bridging x10 with good height and alternating leg kicks with good pelvic stability indicating functional hip strength  Status at Eval: challenged with full height bridging, lacking pelvic stability, strength deficit in hamstrings, gluts, ADD/ABD  Last PN 3/24/22: demonstrates ability to perform full  bridging without LBP, MET     Updated goal as of 5/2/22:  5.  Patient demonstrates improved ADD lift test to >or= 3/10 indicating stability needed for gait and dynamic ADL  Status on 5/2/22: ADD lift test 1/5  Current:    PLAN  []  Upgrade activities as tolerated     [x]  Continue plan of care  []  Update interventions per flow sheet       []  Discharge due to:_  []  Other:_      Erica Dunlap PTA 5/20/2022  8:21 AM    Future Appointments   Date Time Provider Finn Galarzai   5/20/2022  8:30 AM CHRIS LermaHPNAWAF THE [facility]   5/25/2022 11:05 AM IO LAB VISIT IO BS AMB   5/25/2022  1:30 PM Betsy Glover PT MIHPTBKENNEDY THE [facility]   6/1/2022  8:40 AM Harsha Ortega MD [facility] BS AMB   6/1/2022 11:30 AM RUDDY MathewHPTBKENNEDY THE [facility]   6/2/2022 11:30 AM RUDDY MathewHPTBKENNEDY THE [facility]   6/6/2022 10:00 AM RUDDY MathewHPTBKENNEDY THE [facility]   6/8/2022 10:00 AM RUDDY Mathew [facility]   2/8/2023 12:40 PM Oz Pelayo DO [facility] BS AMB

## 2022-05-25 ENCOUNTER — HOSPITAL ENCOUNTER (OUTPATIENT)
Dept: LAB | Age: 42
Discharge: HOME | End: 2022-05-25
Payer: COMMERCIAL

## 2022-05-25 ENCOUNTER — APPOINTMENT (OUTPATIENT)
Dept: INTERNAL MEDICINE CLINIC | Age: 42
End: 2022-05-25

## 2022-05-25 ENCOUNTER — APPOINTMENT (OUTPATIENT)
Dept: PHYSICAL THERAPY | Age: 42
End: 2022-05-25
Payer: COMMERCIAL

## 2022-05-25 DIAGNOSIS — R73.9 HYPERGLYCEMIA: ICD-10-CM

## 2022-05-25 DIAGNOSIS — E03.9 ACQUIRED HYPOTHYROIDISM: ICD-10-CM

## 2022-05-25 DIAGNOSIS — I10 HYPERTENSION, UNSPECIFIED TYPE: ICD-10-CM

## 2022-05-25 LAB
ALBUMIN SERPL-MCNC: 4 G/DL (ref 3.4–5)
ALBUMIN/GLOB SERPL: 1.2 {RATIO} (ref 0.8–1.7)
ALP SERPL-CCNC: 55 U/L (ref 45–117)
ALT SERPL-CCNC: 24 U/L (ref 13–56)
ANION GAP SERPL CALC-SCNC: 4 MMOL/L (ref 3–18)
AST SERPL-CCNC: 12 U/L (ref 10–38)
BASOPHILS # BLD: 0.1 K/UL (ref 0–0.1)
BASOPHILS NFR BLD: 1 % (ref 0–2)
BILIRUB SERPL-MCNC: 0.6 MG/DL (ref 0.2–1)
BUN SERPL-MCNC: 13 MG/DL (ref 7–18)
BUN/CREAT SERPL: 18 (ref 12–20)
CALCIUM SERPL-MCNC: 9.4 MG/DL (ref 8.5–10.1)
CHLORIDE SERPL-SCNC: 102 MMOL/L (ref 100–111)
CHOLEST SERPL-MCNC: 211 MG/DL
CO2 SERPL-SCNC: 31 MMOL/L (ref 21–32)
CREAT SERPL-MCNC: 0.71 MG/DL (ref 0.6–1.3)
CREAT UR-MCNC: 24 MG/DL (ref 30–125)
DIFFERENTIAL METHOD BLD: ABNORMAL
EOSINOPHIL # BLD: 0.3 K/UL (ref 0–0.4)
EOSINOPHIL NFR BLD: 3 % (ref 0–5)
ERYTHROCYTE [DISTWIDTH] IN BLOOD BY AUTOMATED COUNT: 12.4 % (ref 11.6–14.5)
EST. AVERAGE GLUCOSE BLD GHB EST-MCNC: 111 MG/DL
GLOBULIN SER CALC-MCNC: 3.3 G/DL (ref 2–4)
GLUCOSE SERPL-MCNC: 103 MG/DL (ref 74–99)
HBA1C MFR BLD: 5.5 % (ref 4.2–5.6)
HCT VFR BLD AUTO: 41.2 % (ref 35–45)
HDLC SERPL-MCNC: 41 MG/DL (ref 40–60)
HDLC SERPL: 5.1 {RATIO} (ref 0–5)
HGB BLD-MCNC: 13.6 G/DL (ref 12–16)
IMM GRANULOCYTES # BLD AUTO: 0 K/UL (ref 0–0.04)
IMM GRANULOCYTES NFR BLD AUTO: 0 % (ref 0–0.5)
LDLC SERPL CALC-MCNC: 149.6 MG/DL (ref 0–100)
LIPID PROFILE,FLP: ABNORMAL
LYMPHOCYTES # BLD: 1.5 K/UL (ref 0.9–3.6)
LYMPHOCYTES NFR BLD: 16 % (ref 21–52)
MCH RBC QN AUTO: 31.4 PG (ref 24–34)
MCHC RBC AUTO-ENTMCNC: 33 G/DL (ref 31–37)
MCV RBC AUTO: 95.2 FL (ref 78–100)
MICROALBUMIN UR-MCNC: <0.5 MG/DL (ref 0–3)
MICROALBUMIN/CREAT UR-RTO: ABNORMAL MG/G (ref 0–30)
MONOCYTES # BLD: 0.6 K/UL (ref 0.05–1.2)
MONOCYTES NFR BLD: 6 % (ref 3–10)
NEUTS SEG # BLD: 7.2 K/UL (ref 1.8–8)
NEUTS SEG NFR BLD: 75 % (ref 40–73)
NRBC # BLD: 0 K/UL (ref 0–0.01)
NRBC BLD-RTO: 0 PER 100 WBC
PLATELET # BLD AUTO: 420 K/UL (ref 135–420)
PMV BLD AUTO: 9.1 FL (ref 9.2–11.8)
POTASSIUM SERPL-SCNC: 3.8 MMOL/L (ref 3.5–5.5)
PROT SERPL-MCNC: 7.3 G/DL (ref 6.4–8.2)
RBC # BLD AUTO: 4.33 M/UL (ref 4.2–5.3)
SODIUM SERPL-SCNC: 137 MMOL/L (ref 136–145)
T4 FREE SERPL-MCNC: 1 NG/DL (ref 0.7–1.5)
TRIGL SERPL-MCNC: 102 MG/DL (ref ?–150)
TSH SERPL DL<=0.05 MIU/L-ACNC: 2.17 UIU/ML (ref 0.36–3.74)
VLDLC SERPL CALC-MCNC: 20.4 MG/DL
WBC # BLD AUTO: 9.6 K/UL (ref 4.6–13.2)

## 2022-05-25 PROCEDURE — 85025 COMPLETE CBC W/AUTO DIFF WBC: CPT

## 2022-05-25 PROCEDURE — 36415 COLL VENOUS BLD VENIPUNCTURE: CPT

## 2022-05-25 PROCEDURE — 80061 LIPID PANEL: CPT

## 2022-05-25 PROCEDURE — 80053 COMPREHEN METABOLIC PANEL: CPT

## 2022-05-25 PROCEDURE — 83036 HEMOGLOBIN GLYCOSYLATED A1C: CPT

## 2022-05-25 PROCEDURE — 82043 UR ALBUMIN QUANTITATIVE: CPT

## 2022-05-25 PROCEDURE — 84439 ASSAY OF FREE THYROXINE: CPT

## 2022-06-01 ENCOUNTER — APPOINTMENT (OUTPATIENT)
Dept: PHYSICAL THERAPY | Age: 42
End: 2022-06-01
Payer: COMMERCIAL

## 2022-06-01 ENCOUNTER — OFFICE VISIT (OUTPATIENT)
Dept: INTERNAL MEDICINE CLINIC | Age: 42
End: 2022-06-01
Payer: COMMERCIAL

## 2022-06-01 VITALS
WEIGHT: 244 LBS | HEART RATE: 67 BPM | SYSTOLIC BLOOD PRESSURE: 129 MMHG | OXYGEN SATURATION: 99 % | HEIGHT: 68 IN | RESPIRATION RATE: 16 BRPM | TEMPERATURE: 97.7 F | DIASTOLIC BLOOD PRESSURE: 80 MMHG | BODY MASS INDEX: 36.98 KG/M2

## 2022-06-01 DIAGNOSIS — L55.9 SUNBURN: ICD-10-CM

## 2022-06-01 DIAGNOSIS — E78.5 HYPERLIPIDEMIA, UNSPECIFIED HYPERLIPIDEMIA TYPE: ICD-10-CM

## 2022-06-01 DIAGNOSIS — R25.1 TREMOR: ICD-10-CM

## 2022-06-01 DIAGNOSIS — I10 HYPERTENSION, UNSPECIFIED TYPE: Primary | ICD-10-CM

## 2022-06-01 DIAGNOSIS — N20.0 NEPHROLITHIASIS: ICD-10-CM

## 2022-06-01 PROCEDURE — 99214 OFFICE O/P EST MOD 30 MIN: CPT | Performed by: INTERNAL MEDICINE

## 2022-06-01 RX ORDER — MELOXICAM 7.5 MG/1
7.5 TABLET ORAL
Qty: 40 TABLET | Refills: 0 | Status: SHIPPED | OUTPATIENT
Start: 2022-06-01 | End: 2022-06-02 | Stop reason: DRUGHIGH

## 2022-06-01 NOTE — PROGRESS NOTES
Franciaadebayo Myress presents today for   Chief Complaint   Patient presents with    Follow-up    Labs     5-25-22    Sunburn     blisters on back         1. \"Have you been to the ER, urgent care clinic since your last visit? Hospitalized since your last visit? \" no    2. \"Have you seen or consulted any other health care providers outside of the [address] [place] [address] since your last visit? \" yes     3. For patients aged 39-70: Has the patient had a colonoscopy / FIT/ Cologuard? NA - based on age      If the patient is female:    4. For patients aged 41-77: Has the patient had a mammogram within the past 2 years? Yes - no Care Gap present  See top three    5. For patients aged 21-65: Has the patient had a pap smear?  Yes - no Care Gap present

## 2022-06-01 NOTE — PROGRESS NOTES
INTERNISTS OF [facility]:  6/1/2022, MRN: [id number]      Vandana Hicks is a 36 y.o. female and presents to clinic for Follow-up, Labs (5-25-22), and Sunburn (blisters on back)      Subjective: The pt is a 42yo female with h/o ?hypothyroidism, migraine HAs (refractory to botox rx and multiple rx in the past, followed by [facility]), thoracic outlet syndrome (left) s/p surgery 12/17, hip OA, and kidney stones. 1.  Hypertension: At her last appointment, she had an elevated blood pressure of 160/101. Her TFTs last month were normal.  Her electrolytes and creatinine were also normal.  Today her blood pressure is: 129/80. She is taking metoprolol. No microalbuminuria per recent labs. Her A1c is normal.She has less HAs.     2.  Benign Essential Tremor: Today she reports: her tremor is unchanged. No new neurologic symptoms. 3.  Nephrolithiasis: She went to the emergency department after having discomfort with urination, thought to be secondary to kidney stones. A CT scan was obtained. Findings are listed below. Since then, she met with her urologist who recommended a follow-up study and appointment in a year. Today she reports: she is asymptomatic. 4/7/22 Abdomen/Pelvis CT: Developing left hydronephrosis and ureteral dilation without distinct ureteral mass or stone. Focal vague bladder wall thickening. Possibly from urothelial lesion. CT urogram may be of further diagnostic benefit. Punctate intrarenal stone. 4. Sunburn: She is reporting blisters on her back since Sunday. She was outside x 3 hours with sunscreen. 5.  Hyperlipidemia: Her most recent labs show that her LDL is in the 149 range. She is not on a cholesterol-lowering medication.       Patient Active Problem List    Diagnosis Date Noted    Anxiety 02/02/2022    Hypertension 02/02/2022    Severe obesity ([address] 03/08/2019    Family history of premature CAD 12/14/2018    Acquired hypothyroidism 10/07/2018    Migraine 10/07/2018       Current Outpatient Medications   Medication Sig Dispense Refill    Nurtec ODT 75 mg disintegrating tablet TAKE 1 TAB BY MOUTH AS NEEDED FOR MIGRAINE. MAX 1 DOSE IN 24 HOURS. 8 Tablet 0    metoprolol succinate (TOPROL-XL) 25 mg XL tablet Take 1 Tablet by mouth daily. 30 Tablet 5    TENS unit and electrodes (Cefaly) cmpk Cefaly dual. 1 Each 3    EPINEPHrine (EPIPEN) 0.3 mg/0.3 mL injection       potassium chloride (KLOR-CON) 20 mEq pack TAKE 1 PACKET TWICE A DAY FOR 3 DAYS      ketorolac (TORADOL) 10 mg tablet Take 1 Tablet by mouth every six (6) hours as needed for Pain for up to 10 doses.  10 Tablet 0       Allergies   Allergen Reactions    Codeine Hives, Itching, Swelling and Anaphylaxis    Emgality Pen [Galcanezumab-Gnlm] Rash    Norvasc [Amlodipine] Other (comments)     BLE edema    Shellfish Derived Shortness of Breath       Past Medical History:   Diagnosis Date    Adverse effect of anesthesia     HX OF COLLAPSED LUNG    Benign tumor of cervix     Calculus of kidney     Elevated BP without diagnosis of hypertension     Endometriosis     Fracture of finger of right hand 2018    5th digit    H/O thoracic outlet syndrome     Headache     Hypertension     Ill-defined condition     OVARIAN CYST    Ill-defined condition     COLLAPSED LUNG    Migraine headache     Nausea & vomiting     Thyroid disease     hypothyroidism    Trauma        Past Surgical History:   Procedure Laterality Date    HX BREAST REDUCTION      bilateral    HX  SECTION      x2    HX CHOLECYSTECTOMY      HX DILATION AND CURETTAGE      x3    HX HYSTERECTOMY  2015    HX OTHER SURGICAL  2017    REMOVED EXTRA RIB IN SPINE    HX PARTIAL HYSTERECTOMY      HX PELVIC LAPAROSCOPY      VASCULAR SURGERY PROCEDURE UNLIST         Family History   Problem Relation Age of Onset   Luis Edidier Teejack Migraines Mother     Hypertension Mother     Cancer Father         Non Hodgens lymphoma    Heart Disease Father    Luis Edidier Randaljack Heart Attack Father     No Known Problems Sister     Diabetes Maternal Grandmother     Dementia Maternal Grandmother     No Known Problems Maternal Grandfather     No Known Problems Paternal Grandmother     No Known Problems Paternal Grandfather     Migraines Son     Migraines Daughter        Social History     Tobacco Use    Smoking status: Never Smoker    Smokeless tobacco: Never Used   Substance Use Topics    Alcohol use: Not Currently     Alcohol/week: 0.0 standard drinks     Comment: Occasionally       ROS   Review of Systems   Constitutional: Negative for chills and fever. HENT: Negative for ear pain and sore throat. Eyes: Negative for blurred vision and pain. Respiratory: Negative for cough and shortness of breath. Cardiovascular: Negative for chest pain. Gastrointestinal: Negative for abdominal pain, blood in stool and melena. Genitourinary: Negative for dysuria and hematuria. Musculoskeletal: Negative for joint pain and myalgias. Skin: Positive for rash. Neurological: Positive for tremors. Negative for headaches. Endo/Heme/Allergies: Does not bruise/bleed easily. Psychiatric/Behavioral: Negative for substance abuse. Objective     Vitals:    06/01/22 0839   Resp: 16   Temp: 97.7 °F (36.5 °C)   TempSrc: Temporal   Weight: 244 lb (110.7 kg)   Height: 5' 8\" (1.727 m)   PainSc:   6       Physical Exam  Vitals and nursing note reviewed. HENT:      Head: Normocephalic and atraumatic. Right Ear: External ear normal.      Left Ear: External ear normal.   Eyes:      General: No scleral icterus. Right eye: No discharge. Left eye: No discharge. Conjunctiva/sclera: Conjunctivae normal.   Cardiovascular:      Rate and Rhythm: Normal rate and regular rhythm. Heart sounds: Normal heart sounds. No murmur heard. No friction rub. No gallop. Pulmonary:      Effort: Pulmonary effort is normal. No respiratory distress.       Breath sounds: Normal breath sounds. No wheezing or rales. Abdominal:      General: Bowel sounds are normal. There is no distension. Palpations: Abdomen is soft. There is no mass. Tenderness: There is no abdominal tenderness. There is no guarding or rebound. Musculoskeletal:         General: No swelling (BUE) or tenderness (BUE). Cervical back: Neck supple. Lymphadenopathy:      Cervical: No cervical adenopathy. Skin:     General: Skin is warm and dry. Findings: Rash (sunburn is present along her back, TTP) present. No erythema. Neurological:      Mental Status: She is alert. Motor: No abnormal muscle tone. Gait: Gait normal.   Psychiatric:         Mood and Affect: Mood normal.         LABS   Data Review:   Lab Results   Component Value Date/Time    WBC 9.6 05/25/2022 10:51 AM    HGB 13.6 05/25/2022 10:51 AM    HCT 41.2 05/25/2022 10:51 AM    PLATELET 609 83/56/9890 10:51 AM    MCV 95.2 05/25/2022 10:51 AM       Lab Results   Component Value Date/Time    Sodium 137 05/25/2022 10:51 AM    Potassium 3.8 05/25/2022 10:51 AM    Chloride 102 05/25/2022 10:51 AM    CO2 31 05/25/2022 10:51 AM    Anion gap 4 05/25/2022 10:51 AM    Glucose 103 (H) 05/25/2022 10:51 AM    BUN 13 05/25/2022 10:51 AM    Creatinine 0.71 05/25/2022 10:51 AM    BUN/Creatinine ratio 18 05/25/2022 10:51 AM    GFR est AA >60 05/25/2022 10:51 AM    GFR est non-AA >60 05/25/2022 10:51 AM    Calcium 9.4 05/25/2022 10:51 AM       Lab Results   Component Value Date/Time    Cholesterol, total 211 (H) 05/25/2022 10:51 AM    HDL Cholesterol 41 05/25/2022 10:51 AM    LDL, calculated 149.6 (H) 05/25/2022 10:51 AM    VLDL, calculated 20.4 05/25/2022 10:51 AM    Triglyceride 102 05/25/2022 10:51 AM    CHOL/HDL Ratio 5.1 (H) 05/25/2022 10:51 AM       Lab Results   Component Value Date/Time    Hemoglobin A1c 5.5 05/25/2022 10:51 AM    Hemoglobin A1c, External 5.3 11/09/2018 12:00 AM       Assessment/Plan:   1. Hypertension: BP is stable.   - C/w rx as prescribed. - RTC in 6 months for a BP check    2. Tremor: Unchanged. +Intention tremor. Observation. 3. Hyperlipidemia: LDL is 149. - I encouraged her to reduce her weight by maintaining a heart healthy diet. 4. Sunburn  - Supportive care measures. Ok to use aloe rx and/or calamine topical rx prn comfort  - I encouraged her to apply sunscreen. 5. Nephrolithiasis: Asymptomatic.  - I encouraged her to f/u with Urology  - I encouraged her to drink lots of fluids daily      Health Maintenance Due   Topic Date Due    Hepatitis C Screening  Never done    COVID-19 Vaccine (1) Never done     Lab review: labs are reviewed in the EHR    I have discussed the diagnosis with the patient and the intended plan as seen in the above orders. The patient has received an after-visit summary and questions were answered concerning future plans. I have discussed medication side effects and warnings with the patient as well. I have reviewed the plan of care with the patient, accepted their input and they are in agreement with the treatment goals. All questions were answered. The patient understands the plan of care. Handouts provided today with above information. Pt instructed if symptoms worsen to call the office or report to the ED for continued care. Greater than 50% of the visit time was spent in counseling and/or coordination of care. Voice recognition was used to generate this report, which may have resulted in some phonetic based errors in grammar and contents. Even though attempts were made to correct all the mistakes, some may have been missed, and remained in the body of the document.           Marvin Dhillon MD

## 2022-06-01 NOTE — PATIENT INSTRUCTIONS
Home Blood Pressure Test: About This Test  What is it? A home blood pressure test allows you to keep track of your blood pressure at home. Blood pressure is a measure of the force of blood against the walls of your arteries. Blood pressure readings include two numbers, such as 130/80 (say \"130 over 80\"). The first number is the systolic pressure. The second number is the diastolic pressure. Why is this test done? You may do this test at home to:  · Find out if you have high blood pressure. · Track your blood pressure if you have high blood pressure. · Track how well medicine is working to reduce high blood pressure. · Check how lifestyle changes, such as weight loss and exercise, are affecting blood pressure. How do you prepare for the test?  For at least 30 minutes before you take your blood pressure, don't exercise, drink caffeine, or smoke. Empty your bladder before the test. Sit quietly with your back straight and both feet on the floor for at least 5 minutes. This helps you take your blood pressure while you feel comfortable and relaxed. How is the test done? · If your doctor recommends it, take your blood pressure twice a day. Take it in the morning and evening. · Sit with your arm slightly bent and resting on a table so that your upper arm is at the same level as your heart. · Use the same arm each time you take your blood pressure. · Place the blood pressure cuff on the bare skin of your upper arm. You may have to roll up your sleeve, remove your arm from the sleeve, or take your shirt off. · Wrap the blood pressure cuff around your upper arm so that the lower edge of the cuff is about 1 inch above the bend of your elbow. · Do not move, talk, or text while you take your blood pressure. Follow the instructions that came with your blood pressure monitor. They might be different from the following. · Press the on/off button on the automatic monitor.  Then you may need to wait until the screen says the monitor is ready. · Press the start button. The cuff will inflate and deflate by itself. · Your blood pressure numbers will appear on the screen. · Wait one minute and take your blood pressure again. · If your monitor does not automatically save your numbers, write them in your log book, along with the date and time. Follow-up care is a key part of your treatment and safety. Be sure to make and go to all appointments, and call your doctor if you are having problems. It's also a good idea to keep a list of the medicines you take. Where can you learn more? Go to http://www.mcwilliams.com/  Enter C427 in the search box to learn more about \"Home Blood Pressure Test: About This Test.\"  Current as of: January 10, 2022               Content Version: 13.2  © 2006-2022 [employer]. Care instructions adapted under license by [employer] (which disclaims liability or warranty for this information). If you have questions about a medical condition or this instruction, always ask your healthcare professional. [place] [address] any warranty or liability for your use of this information. Sunburn: Care Instructions  Overview  A sunburn is skin damage from the sun's ultraviolet (UV) rays. Most sunburns cause mild pain and redness but affect only the outer layer of skin. These are called first-degree burns. The red skin might hurt when you touch it. These sunburns are mild and can usually be treated at home. Skin that is red and painful and that swells up and blisters may mean that deep skin layers and nerve endings have been damaged. These are second-degree burns. This type of sunburn is usually more painful and takes longer to heal.  Follow-up care is a key part of your treatment and safety. Be sure to make and go to all appointments, and call your doctor if you are having problems.  It's also a good idea to know your test results and keep a list of the medicines you take. How can you care for yourself at home? · Use cool cloths on the sunburned areas. · Apply soothing lotions with aloe vera to sunburned areas. · Try anti-inflammatory medicine (like ibuprofen) to reduce pain, swelling, and fever. Read and follow all instructions on the label. · Don't try to stop peeling after a sunburn. It's part of the healing process. · Protect your skin by using sunscreen, hats, and loose-fitting, tightly-woven clothes. Caring for blisters  Blisters often heal on their own. · Don't try to break blisters. Leave them alone. · Don't remove the flap of skin covering the blister unless it tears or gets dirty or pus forms under it. The flap protects the healing skin underneath. · If a blister ruptures, gently clean it with mild soap and water and loosely cover it. Put a thin layer of petroleum jelly on the bandage before you put the bandage on. This will keep it from sticking to the blister. When should you call for help? Call your doctor now or seek immediate medical care if:    · You have signs of needing more fluids. You have sunken eyes, a dry mouth, and you pass only a little urine.     · You have signs of infection, such as:  ? Increased pain, swelling, warmth, or redness. ? Red streaks leading from the area. ? Pus draining from the area. ? A fever. Watch closely for changes in your health, and be sure to contact your doctor if:    · You do not get better as expected. Where can you learn more? Go to http://www.gray.com/  Enter U678 in the search box to learn more about \"Sunburn: Care Instructions. \"  Current as of: July 1, 2021               Content Version: 13.2  © 8262-6577 "[employer]. Care instructions adapted under license by [employer] (which disclaims liability or warranty for this information).  If you have questions about a medical condition or this instruction, always ask your healthcare professional. [address] any warranty or liability for your use of this information.

## 2022-06-01 NOTE — TELEPHONE ENCOUNTER
----- Message from Mathew Queen sent at 6/1/2022  9:50 AM EDT -----  Subject: Medication Problem    QUESTIONS  Name of Medication? meloxicam (MOBIC) 7.5 mg tablet  Patient-reported dosage and instructions? 7.5 mg, twice a day  What question or problem do you have with the medication? Insurance will   not cover Rx the way it is written. Please change the dosage or   instructions to meet insurance needs. Insurance is [employer]. Please   call patient asap to discuss and clarify. Preferred Pharmacy? [id number] [place], [address] phone number (if available)? [phone]  Additional Information for Provider?   ---------------------------------------------------------------------------  --------------  CALL BACK INFO  What is the best way for the office to contact you? OK to leave message on   voicemail  Preferred Call Back Phone Number? [phone]  ---------------------------------------------------------------------------  --------------  SCRIPT ANSWERS  Relationship to Patient?  Self

## 2022-06-02 ENCOUNTER — APPOINTMENT (OUTPATIENT)
Dept: PHYSICAL THERAPY | Age: 42
End: 2022-06-02
Payer: COMMERCIAL

## 2022-06-02 RX ORDER — MELOXICAM 15 MG/1
15 TABLET ORAL DAILY
Qty: 30 TABLET | Refills: 0 | Status: SHIPPED | OUTPATIENT
Start: 2022-06-02 | End: 2022-08-02

## 2022-06-02 NOTE — TELEPHONE ENCOUNTER
Received fax from [facility] stating that the insurance will not pay for meloxicam 7.5 mg BID. Rx changed to meloxicam 15 mg 1 x daily.

## 2022-06-06 ENCOUNTER — HOSPITAL ENCOUNTER (OUTPATIENT)
Dept: PHYSICAL THERAPY | Age: 42
Discharge: HOME | End: 2022-06-06
Payer: COMMERCIAL

## 2022-06-06 PROCEDURE — 97112 NEUROMUSCULAR REEDUCATION: CPT

## 2022-06-06 PROCEDURE — 97530 THERAPEUTIC ACTIVITIES: CPT

## 2022-06-06 PROCEDURE — 97110 THERAPEUTIC EXERCISES: CPT

## 2022-06-06 NOTE — PROGRESS NOTES
[facility] at the [address], [facility], [address] [facility]  Phone: [phone]      Fax:  [phone]         Discharge Summary    Patient name: Gillian Rose     Start of Care: 2022  Referral source: Robert Martins MD    : 1980  Medical/Treatment Diagnosis: Left hip pain [M25.552]  Onset Date:2020   Prior Hospitalization: see medical history   Provider#: [id number]  Medications: Verified on Patient Summary List    Comorbidities: s/p right ankle sprain, chronic LBP/CS pain left, sleep apnea, s/p removal of left first rib   Prior Level of Function: Full function without hip pain prior to fall/ankle sprain in     Visits from Start of Care: 16    Missed Visits: 0    Reporting Period : 22 to 22    Goals/Measure of Progress:  Short Term Goals: To be accomplished in 3 weeks:              1. Patient is compliant with daily HEP for prophylaxis and progress towards all goals  Status at Eval: HEP initiated  Last PN: compliance per pt report goal MET 3/18/22     2. Patient demonstrates negative Adduction drop test for both hips indicating improvement in lumbo-pelvic alignment and femoral acetabular mobility needed for gait  Status at Eval: bilateral positive ADT  Last PN 22: ADT negative at beginning of session, MET     3. Improved trunk rotation to <or= 15 in bilaterally without indicating mobility needed to perform all ADL and ambulation  Status at Eval: trunk rotation limited to right 18 in, left 17.5 in, rib cage stiffness, shallow breathing pattern   Last PN 3/21/22: LTR: right = 15 inches, left 14.5 in - MET         Long Term Goals: To be accomplished in 6 weeks:              1.  Improved FOTO score to >or= 68/100 indicating improvement in overall function including increased tolerance to left S/L at night, increased ease getting in/out of car and for prolonged walking during ADL  Status at Eval: 57/100, pain with above activities  Last PN 4/14/22 88/100 MET     2. Patient demonstrates ability to perform YARON squat test >or= 3/5 with good postural alignment and ability to bring thighs to parallel position with floor indicating mobility and stability needed for ADL  Status at Eval: Squat test 2/5, limited range with testing , fwd WS with LS hyperlordosis  Last PN: 4/19/22: squat test 2/5 however improved control of PPT, progressing  Current 5/20/22: squat test 3/5 with good postural form parallel with floor, slight right knee instability. IN PROGRESS  Current 6/6/22: TRX squat with heel wedge 10x (minimal knee pain and no hip pain), chair taps squat with heel wedge 5x (4/10 knee pain and not hip pain) - progressing      3. Patient demonstrates negative Gaudencio test and IR >or= 30 deg for left hip without pain at end range for functional mobility with ADL  Status at Eval: sharp pain with end range left hip IR in supine, + left Gaudencio test  Last PN3/14/22: left hip IR 30 deg without pain, negative Gaudencio test left, MET     4.Patient demonstrates ability to perform bridging x10 with good height and alternating leg kicks with good pelvic stability indicating functional hip strength  Status at Eval: challenged with full height bridging, lacking pelvic stability, strength deficit in hamstrings, gluts, ADD/ABD  Last PN 3/24/22: demonstrates ability to perform full  bridging without LBP, MET     Updated goal as of 5/2/22:  5. Patient demonstrates improved ADD lift test to >or= 3/10 indicating stability needed for gait and dynamic ADL  Status on 5/2/22: ADD lift test 1/5  Current 6/6/22: left = 2/5; right = 3/5 - almost MET         Assessment/ Summary of Care: 42 YOF presenting to PT with c/o left hip pain which started after a fall /ankle sprain in 8/2020. Pt reported 75 % improvement of symptoms since the start of therapy; reporting examples of improvement with an absence of initial hip pain and increase in QOL.  Pt continues to report deficits with occasional knee pain with squatting activities. This assessment was completed at the therapist earliest convince. Per updated goals the pt demo an improved hip/pelvis/lumbar mobility and functional LE strength. The pt was educated on the ACSM recommendations of Moderate Intensity activity 30 mins a day , 5 day a week to improve health. Pt is independent with all HEP and is agreeable to discharge with most goals met. The PT reviewed all provided and updated HEP with the pt. Recommended returning to physical therapy as needed to address any remaining deficits with a new referral.     RECOMMENDATIONS:  [x]Discontinue therapy: [x]Patient has reached or is progressing toward set goals      []Patient is non-compliant or has abdicated      []Due to lack of appreciable progress towards set goals    Kita Lynne, PT 6/6/2022 11:59 AM    NOTE TO PHYSICIAN:  Please complete the following and fax to: [facility] at [facility] at [phone]  Retain this original for your records. If you are unable to process this request in   24 hours, please contact our office.      [] I have read the above report and request that my patient continue therapy with the following changes/special instructions:  [] I have read the above report and request that my patient be discharged from therapy    Physician's Signature:____________Date:_________TIME:________  Kusum Joya MD    ** Signature, Date and Time must be completed for valid certification **

## 2022-06-06 NOTE — PROGRESS NOTES
PT DAILY TREATMENT NOTE    Patient Name: Bryan Primer  Date:2022  : 1980  [x]  Patient  Verified  Payor: BLUE CROSS / Plan: [address] [place] [address] / Product Type: PPO /    In time:10:02am  Out time: 10:55  Total Treatment Time (min): 53  Total Timed Codes (min): 53  1:1 Treatment Time (MC/BCBS only): 48   Visit #: 16 of 21    Treatment Dx: Left hip pain [M25.552]    SUBJECTIVE  Pain Level (0-10 scale): 0  Any medication changes, allergies to medications, adverse drug reactions, diagnosis change, or new procedure performed?: [x] No    [] Yes (see summary sheet for update)  Subjective functional status/changes:   [] No changes reported  The pt reported that she had an odd reaction to the sun - after just 3 hours with sun screen on she developed excessive blisters on her back.   She stated that the only change she had was her BP medication which has been communicated to the PT in past visits    OBJECTIVE      26 min Therapeutic Exercise:  [x] See flow sheet :    Rationale: increase ROM, increase strength and improve coordination to improve the patients ability to perform daily activities with decreased pain and symptom levels    18 min Therapeutic Activity:  [x]  See flow sheet : pt education - see below    Rationale: increase ROM, increase strength and improve coordination  to improve the patients ability to perform daily activities with decreased pain and symptom levels     9 min Neuromuscular Re-education:  [x]  See flow sheet :   Rationale: increase ROM, increase strength, improve coordination, improve balance and increase proprioception  to improve the patients ability to perform daily activities with decreased pain and symptom levels            With   [x] TE   [] TA   [] neuro   [] other: Patient Education: [x] Review HEP    [] Progressed/Changed HEP based on:   [] positioning   [] body mechanics   [] transfers   [] heat/ice application    [] other:      Other Objective/Functional Measures: updated goals for D/C     Pain Level (0-10 scale) post treatment: 0    ASSESSMENT/Changes in Function: 42 YOF presenting to PT with c/o left hip pain which started after a fall /ankle sprain in 8/2020. Pt reported 75 % improvement of symptoms since the start of therapy; reporting examples of improvement with an absence of initial hip pain and increase in QOL. Pt continues to report deficits with occasional knee pain with squatting activities. This assessment was completed at the therapist earliest convince. Per updated goals the pt demo an improved hip/pelvis/lumbar mobility and functional LE strength. The pt was educated on the ACSM recommendations of Moderate Intensity activity 30 mins a day , 5 day a week to improve health. Pt is independent with all HEP and is agreeable to discharge with most goals met. The PT reviewed all provided and updated HEP with the pt. Recommended returning to physical therapy as needed to address any remaining deficits with a new referral.        [x]  See Plan of Care  [x]  See progress note/recertification  [x]  See Discharge Summary         Progress towards goals / Updated goals:  Short Term Goals: To be accomplished in 3 weeks:              1. Patient is compliant with daily HEP for prophylaxis and progress towards all goals  Status at Eval: HEP initiated  Last PN: compliance per pt report goal MET 3/18/22     2.  Patient demonstrates negative Adduction drop test for both hips indicating improvement in lumbo-pelvic alignment and femoral acetabular mobility needed for gait  Status at Eval: bilateral positive ADT  Last PN 5/2/22: ADT negative at beginning of session, MET     3. Improved trunk rotation to <or= 15 in bilaterally without indicating mobility needed to perform all ADL and ambulation  Status at Eval: trunk rotation limited to right 18 in, left 17.5 in, rib cage stiffness, shallow breathing pattern   Last PN 3/21/22: LTR: right = 15 inches, left 14.5 in - MET       Long Term Goals: To be accomplished in 6 weeks:              1. Improved FOTO score to >or= 68/100 indicating improvement in overall function including increased tolerance to left S/L at night, increased ease getting in/out of car and for prolonged walking during ADL  Status at Eval: 57/100, pain with above activities  Last PN 4/14/22 88/100 MET     2. Patient demonstrates ability to perform YARON squat test >or= 3/5 with good postural alignment and ability to bring thighs to parallel position with floor indicating mobility and stability needed for ADL  Status at Eval: Squat test 2/5, limited range with testing , fwd WS with LS hyperlordosis  Last PN: 4/19/22: squat test 2/5 however improved control of PPT, progressing  Current 5/20/22: squat test 3/5 with good postural form parallel with floor, slight right knee instability. IN PROGRESS  Current 6/6/22: TRX squat with heel wedge 10x (minimal knee pain and no hip pain), chair taps squat with heel wedge 5x (4/10 knee pain and not hip pain) - progressing      3. Patient demonstrates negative Gaudencio test and IR >or= 30 deg for left hip without pain at end range for functional mobility with ADL  Status at Eval: sharp pain with end range left hip IR in supine, + left Gaudencio test  Last PN3/14/22: left hip IR 30 deg without pain, negative Gaudencio test left, MET     4.Patient demonstrates ability to perform bridging x10 with good height and alternating leg kicks with good pelvic stability indicating functional hip strength  Status at Eval: challenged with full height bridging, lacking pelvic stability, strength deficit in hamstrings, gluts, ADD/ABD  Last PN 3/24/22: demonstrates ability to perform full  bridging without LBP, MET     Updated goal as of 5/2/22:  5.  Patient demonstrates improved ADD lift test to >or= 3/10 indicating stability needed for gait and dynamic ADL  Status on 5/2/22: ADD lift test 1/5  Current 6/6/22: left = 2/5; right = 3/5 - almost MET    PLAN  [] Upgrade activities as tolerated     []  Continue plan of care  []  Update interventions per flow sheet       [x]  Discharge due to: the pt has an absence of hip symptoms and an increase in QOL; met most goals   []  Other:_      Everette Nguyener, PT 6/6/2022  10:06 AM    Future Appointments   Date Time Provider Finn Beth   6/8/2022 10:00 AM Melecio Anderson, RUDDY [facility]   12/7/2022  8:40 AM Zohra Brasher MD [facility] BS AMB   2/8/2023 12:40 PM Ruperto Tierney DO [facility] BS AMB

## 2022-06-08 ENCOUNTER — APPOINTMENT (OUTPATIENT)
Dept: PHYSICAL THERAPY | Age: 42
End: 2022-06-08
Payer: COMMERCIAL

## 2022-06-16 ENCOUNTER — TELEPHONE (OUTPATIENT)
Dept: ORTHOPEDIC SURGERY | Age: 42
End: 2022-06-16

## 2022-06-16 NOTE — TELEPHONE ENCOUNTER
Luis Gaines from [facility] called stating they faxed a discharge summary that needs to be signed to [phone] on 6/6 and 6/9. She wanted to confirm if this was received. Luis Gaines can be reached at [phone].

## 2022-08-02 ENCOUNTER — OFFICE VISIT (OUTPATIENT)
Dept: NEUROLOGY | Age: 42
End: 2022-08-02
Payer: COMMERCIAL

## 2022-08-02 VITALS
SYSTOLIC BLOOD PRESSURE: 152 MMHG | WEIGHT: 245.2 LBS | OXYGEN SATURATION: 97 % | BODY MASS INDEX: 37.16 KG/M2 | TEMPERATURE: 99.1 F | RESPIRATION RATE: 20 BRPM | DIASTOLIC BLOOD PRESSURE: 98 MMHG | HEIGHT: 68 IN | HEART RATE: 88 BPM

## 2022-08-02 DIAGNOSIS — G44.82 COITAL HEADACHE: ICD-10-CM

## 2022-08-02 DIAGNOSIS — G43.809 OTHER MIGRAINE WITHOUT STATUS MIGRAINOSUS, NOT INTRACTABLE: Primary | ICD-10-CM

## 2022-08-02 PROCEDURE — 99213 OFFICE O/P EST LOW 20 MIN: CPT | Performed by: NURSE PRACTITIONER

## 2022-08-02 RX ORDER — RIMEGEPANT SULFATE 75 MG/75MG
TABLET, ORALLY DISINTEGRATING ORAL
Qty: 8 TABLET | Refills: 6 | Status: SHIPPED | OUTPATIENT
Start: 2022-08-02

## 2022-08-02 NOTE — PROGRESS NOTES
[facility]  [address] [facility], [address], [place], [address]  [address]. [place], [address]  Office:  [phone]  Fax: [phone]  Chief Complaint   Patient presents with    Migraine    Results     Ordered imaging not completed       HPI: Phi Sandoval presents in follow-up. She has history of headaches since a traumatic brain injury in 1998. She was last seen on 1/27/2021 in virtual video visit. MRI brain reordered. Discussed the consideration for another CGRP antagonist like Ajovy but she would like to hold off on injectables for now. Prescribed Cefaly. Prescribed Nurtec for breakthrough. She presents today in follow-up. Reports the bad migraines have been ok. She uses Nurtec for acute treatment which works very well. She is not on migraine preventative except she is on metoprolol for BP. She reports she has history of headache with orgasm over 10 years ago, restarted having these headaches this year around January- it can be with sexual activity and headache is usually on the right side of head and behind the eyes, lasts 60-90 seconds and is excruciating pain, will be ok for a few weeks then sensitive to them for a while and it will hit again. She also has the same type headache with stress- increased stress with her kids or when her mother was in the hospital.  Denies associated neurologic changes, sometimes felt like she had left arm pain but had issues since surgery on that side. Reports she would be very angry with these headaches. Reports hands fall asleep more. 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 Reports tremor diagnosis recently. She reports claustrophobia with MRIs, wasn't able to take off the mask, and could not tolerate it even with Valium. Reports getting headaches all the time, not always a migraine, has some sharp pains that come and go.   She has some sort of headache most days out of the month. Reports more migraines if she is off caffeine for a couple of days. Denies drinking a lot of caffeine. Reports Botox was effective as a preventative but the insurance quit covering it. The Emgality caused injection site reaction. She did not get the Cefaly due to cost but got a TENS unit from [employer]. She wears her CPAP as she can but takes it off periodically throughout the night. She has a history of kidney stones. Endorses she tolerates NSAIDs. She takes Toprol-XL over 6 months or longer. It makes her itch but she is continues to take it for blood pressure. Past Medical History:   Diagnosis Date    Adverse effect of anesthesia     HX OF COLLAPSED LUNG    Benign tumor of cervix     Calculus of kidney     Elevated BP without diagnosis of hypertension     Endometriosis     Fracture of finger of right hand 2018    5th digit    H/O thoracic outlet syndrome     Headache     Hypertension     Ill-defined condition     OVARIAN CYST    Ill-defined condition     COLLAPSED LUNG    Migraine headache     Nausea & vomiting     Thyroid disease     hypothyroidism    Trauma        Past Surgical History:   Procedure Laterality Date    HX BREAST REDUCTION      bilateral    HX  SECTION      x2    HX CHOLECYSTECTOMY      HX DILATION AND CURETTAGE      x3    HX HYSTERECTOMY      HX OTHER SURGICAL  2017    REMOVED EXTRA RIB IN SPINE    HX PARTIAL HYSTERECTOMY      HX PELVIC LAPAROSCOPY      VASCULAR SURGERY PROCEDURE UNLIST         Current Outpatient Medications   Medication Sig Dispense Refill    rimegepant (Nurtec ODT) 75 mg disintegrating tablet TAKE 1 TAB BY MOUTH AS NEEDED FOR MIGRAINE. MAX 1 DOSE IN 24 HOURS. 8 Tablet 6    EPINEPHrine (EPIPEN) 0.3 mg/0.3 mL injection       metoprolol succinate (TOPROL-XL) 25 mg XL tablet Take 1 Tablet by mouth daily.  30 Tablet 5        Allergies   Allergen Reactions    Codeine Hives, Itching, Swelling and Anaphylaxis    Emgality Pen [Galcanezumab-Gnlm] Rash Norvasc [Amlodipine] Other (comments)     BLE edema    Shellfish Derived Shortness of Breath       Social History     Tobacco Use    Smoking status: Never    Smokeless tobacco: Never   Vaping Use    Vaping Use: Never used   Substance Use Topics    Alcohol use: Not Currently     Alcohol/week: 0.0 standard drinks     Comment: Occasionally    Drug use: Never       Family History   Problem Relation Age of Onset    Migraines Mother     Hypertension Mother     Cancer Father         Non Hodgens lymphoma    Heart Disease Father     Heart Attack Father     No Known Problems Sister     Diabetes Maternal Grandmother     Dementia Maternal Grandmother     No Known Problems Maternal Grandfather     No Known Problems Paternal Grandmother     No Known Problems Paternal Grandfather     Migraines Son     Migraines Daughter        Review of Systems:  GENERAL: Denies fever or fatigue  CARDIAC: No CP or SOB  PULMONARY: No cough or SOB  MUSCULOSKELETAL: No new joint pain  NEURO: SEE HPI    Physical Examination:  Visit Vitals  BP (!) 152/98 (BP 1 Location: Left upper arm, BP Patient Position: Sitting, BP Cuff Size: Large adult)   Pulse 88   Temp 99.1 °F (37.3 °C)   Resp 20   Ht 5' 8\" (1.727 m)   Wt 111.2 kg (245 lb 3.2 oz)   SpO2 97%   BMI 37.28 kg/m²       Alert, in NAD. Heart is regular. Oriented x3. Speech is fluent. Speech clear. EOMs are full, PERRL, VFFTC, no nystagmus. No facial asymmetry. Tongue is midline. Strength and tone are normal. No drift of the bilateral upper extremities. Fine finger movements symmetrical. FNF intact bilaterally. Slight bilateral upper extremity action tremor. DTRs +2. Steady gait. Impression/Plan: This is a 79-year-old right-handed female who presents in follow-up for migraines. She has risk factors including head trauma many years ago and family history of migraines. She has been on several prophylactic medications in the past including Topamax, Inderal, and Botox.   More recently she was on Emgality but had side effects of injection site reaction which was intolerable. She is currently off preventatives but is on the low-dose of metoprolol for blood pressure. Reports she does not have many of the bad migraines per month but has some sort of headache most days out of the month. She reported over 10 years ago she had a history of headaches associated with sexual activity, and these restarted around January of this year. We will obtain MRI and MRA. We will obtain open MRI as she cannot tolerate MRI due to claustrophobia. We will continue Nurtec for acute treatment. We may consider options such as indomethacin. We will discuss with Dr. Rashaun Goode. Follow up in 3 months. Diagnoses and all orders for this visit:    1. Other migraine without status migrainosus, not intractable  -     MRI BRAIN WO CONT; Future  -     MRA BRAIN WO CONT; Future  -     rimegepant (Nurtec ODT) 75 mg disintegrating tablet; TAKE 1 TAB BY MOUTH AS NEEDED FOR MIGRAINE. MAX 1 DOSE IN 24 HOURS. 2. Coital headache  -     MRI BRAIN WO CONT; Future  -     MRA BRAIN WO CONT; Future    Total time 25 minutes with 15 minutes spent in counseling. Signed By: Lonny Brown NP        PLEASE NOTE:   Portions of this document may have been produced using voice recognition software. Unrecognized errors in transcription may be present.

## 2022-08-03 DIAGNOSIS — G44.82 COITAL HEADACHE: ICD-10-CM

## 2022-08-03 DIAGNOSIS — G43.809 OTHER MIGRAINE WITHOUT STATUS MIGRAINOSUS, NOT INTRACTABLE: ICD-10-CM

## 2022-09-01 ENCOUNTER — TELEPHONE (OUTPATIENT)
Dept: NEUROLOGY | Age: 42
End: 2022-09-01

## 2022-09-01 NOTE — TELEPHONE ENCOUNTER
Patient called and stated she needs an order for an open mri sent to [address] [place] [address] on [employer]

## 2022-09-28 ENCOUNTER — TELEPHONE (OUTPATIENT)
Dept: NEUROLOGY | Age: 42
End: 2022-09-28

## 2022-10-28 ENCOUNTER — PATIENT MESSAGE (OUTPATIENT)
Dept: NEUROLOGY | Age: 42
End: 2022-10-28

## 2022-12-30 LAB
CREATININE, EXTERNAL: 0.68
HBA1C MFR BLD HPLC: 5.8 %
LDL CHOLESTEROL, EXTERNAL: 118

## 2023-01-04 NOTE — PROGRESS NOTES
Carlitos Vick presents today for   Chief Complaint   Patient presents with    Follow-up       1. \"Have you been to the ER, urgent care clinic since your last visit? Hospitalized since your last visit? \" no    2. \"Have you seen or consulted any other health care providers outside of the [address] [place] [address] since your last visit? \" yes     3. For patients aged 39-70: Has the patient had a colonoscopy / FIT/ Cologuard? NA - based on age      If the patient is female:    4. For patients aged 41-77: Has the patient had a mammogram within the past 2 years? Yes - no Care Gap present  See top three    5. For patients aged 21-65: Has the patient had a pap smear?  Yes - no Care Gap present

## 2023-01-05 ENCOUNTER — OFFICE VISIT (OUTPATIENT)
Dept: INTERNAL MEDICINE CLINIC | Age: 43
End: 2023-01-05
Payer: COMMERCIAL

## 2023-01-05 VITALS
DIASTOLIC BLOOD PRESSURE: 82 MMHG | OXYGEN SATURATION: 99 % | SYSTOLIC BLOOD PRESSURE: 141 MMHG | HEART RATE: 78 BPM | RESPIRATION RATE: 18 BRPM | TEMPERATURE: 98.4 F | WEIGHT: 236 LBS | BODY MASS INDEX: 35.77 KG/M2 | HEIGHT: 68 IN

## 2023-01-05 DIAGNOSIS — R73.03 PREDIABETES: ICD-10-CM

## 2023-01-05 DIAGNOSIS — N28.82 URETERAL DILATATION: ICD-10-CM

## 2023-01-05 DIAGNOSIS — E78.5 HYPERLIPIDEMIA, UNSPECIFIED HYPERLIPIDEMIA TYPE: ICD-10-CM

## 2023-01-05 DIAGNOSIS — N13.30 HYDRONEPHROSIS, UNSPECIFIED HYDRONEPHROSIS TYPE: ICD-10-CM

## 2023-01-05 DIAGNOSIS — E23.6 EMPTY SELLA (HCC): ICD-10-CM

## 2023-01-05 DIAGNOSIS — I10 HYPERTENSION, UNSPECIFIED TYPE: Primary | ICD-10-CM

## 2023-01-05 DIAGNOSIS — N20.0 NEPHROLITHIASIS: ICD-10-CM

## 2023-01-05 DIAGNOSIS — E55.9 VITAMIN D DEFICIENCY: ICD-10-CM

## 2023-01-05 DIAGNOSIS — R79.82 ELEVATED C-REACTIVE PROTEIN (CRP): ICD-10-CM

## 2023-01-05 DIAGNOSIS — M25.50 ARTHRALGIA, UNSPECIFIED JOINT: ICD-10-CM

## 2023-01-05 DIAGNOSIS — R10.9 ABDOMINAL PAIN, UNSPECIFIED ABDOMINAL LOCATION: ICD-10-CM

## 2023-01-05 PROCEDURE — 3077F SYST BP >= 140 MM HG: CPT | Performed by: INTERNAL MEDICINE

## 2023-01-05 PROCEDURE — 99214 OFFICE O/P EST MOD 30 MIN: CPT | Performed by: INTERNAL MEDICINE

## 2023-01-05 PROCEDURE — 3079F DIAST BP 80-89 MM HG: CPT | Performed by: INTERNAL MEDICINE

## 2023-01-05 NOTE — PROGRESS NOTES
[facility]:  1/5/2023, MRN: [id number]      Dread Serrato is a 36 y.o. female and presents to clinic for Follow-up      Subjective: The pt is a 43yo female with h/o ?hypothyroidism, migraine HAs (refractory to botox rx and multiple rx in the past, followed by [address] [place] [address] Neurology), thoracic outlet syndrome (left) s/p surgery 12/17, HTN, HLD, prediabetes, vitamin D deficiency, elevated CRP, empty sella, benign essential tremor, hip OA, and kidney stones. 1. HTN: BP is 141/82. She lost weight since her last apt. Her weight is down to 236lbs from 244lbs at her last apt. No fatigue. 2. Empty Sella: Seen on an MRI of her head 9/26/22. She sees a chiropractor (who used to be a Neurologist) in [place] who ordered labs in between apts. Her CBC and CMP are unremarkable except for a BG of 105. Her UA is WNL. Brain MRI 9/26/22: No acute findings. Brain parenchyma appears within normal limits. Empty sella morphology. This is most commonly an anatomic variant. Correlate likely to exclude symptoms of intracranial HTN. 3. HLD: Her labs from September 2022 show that her total cholesterol is 181. Triglycerides are 103. HDL is 44. LDL Is 118, down from what it previously was. 4. Prediabetes: Her recent labs show an A1C of 5.8. She eats only foods that she can grow or hunt per recommendations from her chiropractor. She is seeing her chiropractor in February. 5. Elevated CRP: Her CRP was mildly elevated at 3.96 in September of 2022 (ref range 0-3). 6. Vitamin D Deficiency: She decreased her vitamin D since her level was checked in September and it was high. It was low in the past.     7. General: She has hip pain and abdominal pain. She was told that her abdominal pain is from scar tissue from endometriosis. She had an unremarkable cervical MRI per her hx. She occasionally has diarrhea. 8. Nephrolithiasis: Last year, she was suspected of having passed a stone.  She was evaluated by a urologist who recommended a follow up study in a year. 22 Abdomen/Pelvis CT: Developing left hydronephrosis and ureteral dilation without distinct ureteral mass or stone. Focal vague bladder wall thickening. Possibly from urothelial lesion. CT urogram may be of further diagnostic benefit. Punctate intrarenal stone. Patient Active Problem List    Diagnosis Date Noted    Anxiety 2022    Hypertension 2022    Severe obesity ([address] 2019    Family history of premature CAD 2018    Acquired hypothyroidism 10/07/2018    Migraine 10/07/2018       Current Outpatient Medications   Medication Sig Dispense Refill    metoprolol succinate (TOPROL-XL) 25 mg XL tablet TAKE 1 TABLET BY MOUTH EVERY DAY 90 Tablet 0    rimegepant (Nurtec ODT) 75 mg disintegrating tablet TAKE 1 TAB BY MOUTH AS NEEDED FOR MIGRAINE.  MAX 1 DOSE IN 24 HOURS. 8 Tablet 6    EPINEPHrine (EPIPEN) 0.3 mg/0.3 mL injection          Allergies   Allergen Reactions    Codeine Hives, Itching, Swelling and Anaphylaxis    Emgality Pen [Galcanezumab-Gnlm] Rash    Norvasc [Amlodipine] Other (comments)     BLE edema    Shellfish Derived Shortness of Breath       Past Medical History:   Diagnosis Date    Adverse effect of anesthesia     HX OF COLLAPSED LUNG    Benign tumor of cervix     Calculus of kidney     Elevated BP without diagnosis of hypertension     Empty sella syndrome (HCC)     Endometriosis     Fracture of finger of right hand 2018    5th digit    H/O thoracic outlet syndrome     Headache     Hypertension     Ill-defined condition     OVARIAN CYST    Ill-defined condition     COLLAPSED LUNG    Migraine headache     Nausea & vomiting     Thyroid disease     hypothyroidism    Trauma        Past Surgical History:   Procedure Laterality Date    HX BREAST REDUCTION      bilateral    HX  SECTION      x2    HX CHOLECYSTECTOMY      HX DILATION AND CURETTAGE      x3    HX HYSTERECTOMY  2015    HX OTHER SURGICAL  2017 REMOVED EXTRA RIB IN SPINE    HX PARTIAL HYSTERECTOMY      HX PELVIC LAPAROSCOPY      CA UNLISTED PROCEDURE VASCULAR SURGERY         Family History   Problem Relation Age of Onset    Migraines Mother     Hypertension Mother     Cancer Father         Non Hodgens lymphoma    Heart Disease Father     Heart Attack Father     No Known Problems Sister     Diabetes Maternal Grandmother     Dementia Maternal Grandmother     No Known Problems Maternal Grandfather     No Known Problems Paternal Grandmother     No Known Problems Paternal Grandfather     Migraines Son     Migraines Daughter        Social History     Tobacco Use    Smoking status: Never    Smokeless tobacco: Never   Substance Use Topics    Alcohol use: Not Currently     Alcohol/week: 0.0 standard drinks     Comment: Occasionally       ROS   Review of Systems   Constitutional:  Negative for chills and fever. HENT:  Negative for ear pain and sore throat. Eyes:  Negative for pain. Respiratory:  Negative for cough and shortness of breath. Cardiovascular:  Negative for chest pain. Gastrointestinal:  Negative for abdominal pain, blood in stool and melena. Genitourinary:  Negative for dysuria and hematuria. Musculoskeletal:  Positive for joint pain. Negative for myalgias. Skin:  Negative for rash. Neurological:  Negative for headaches. Endo/Heme/Allergies:  Does not bruise/bleed easily. Psychiatric/Behavioral:  Negative for substance abuse. Objective     Vitals:    01/05/23 0924 01/05/23 0933   BP: (!) 158/104 (!) 141/82   Pulse: 78    Resp: 18    Temp: 98.4 °F (36.9 °C)    TempSrc: Temporal    SpO2: 99%    Weight: 236 lb (107 kg)    Height: 5' 8\" (1.727 m)    PainSc:   0 - No pain        Physical Exam  Vitals and nursing note reviewed. HENT:      Head: Normocephalic and atraumatic. Right Ear: External ear normal.      Left Ear: External ear normal.   Eyes:      General: No scleral icterus. Right eye: No discharge. Left eye: No discharge. Extraocular Movements: Extraocular movements intact. Conjunctiva/sclera: Conjunctivae normal.   Cardiovascular:      Rate and Rhythm: Normal rate and regular rhythm. Heart sounds: Normal heart sounds. No murmur heard. No friction rub. No gallop. Pulmonary:      Effort: Pulmonary effort is normal. No respiratory distress. Breath sounds: Normal breath sounds. No wheezing or rales. Abdominal:      General: Bowel sounds are normal. There is no distension. Palpations: Abdomen is soft. There is no mass. Tenderness: There is no abdominal tenderness. There is no guarding or rebound. Musculoskeletal:         General: No swelling (BUE) or tenderness (BUE). Cervical back: Neck supple. Lymphadenopathy:      Cervical: No cervical adenopathy. Skin:     General: Skin is warm and dry. Findings: No erythema or rash. Neurological:      Mental Status: She is alert. Motor: No abnormal muscle tone.       Gait: Gait normal.   Psychiatric:         Mood and Affect: Mood normal.       LABS   Data Review:   Lab Results   Component Value Date/Time    WBC 9.6 05/25/2022 10:51 AM    HGB 13.6 05/25/2022 10:51 AM    HCT 41.2 05/25/2022 10:51 AM    PLATELET 273 63/72/4780 10:51 AM    MCV 95.2 05/25/2022 10:51 AM       Lab Results   Component Value Date/Time    Sodium 137 05/25/2022 10:51 AM    Potassium 3.8 05/25/2022 10:51 AM    Chloride 102 05/25/2022 10:51 AM    CO2 31 05/25/2022 10:51 AM    Anion gap 4 05/25/2022 10:51 AM    Glucose 103 (H) 05/25/2022 10:51 AM    BUN 13 05/25/2022 10:51 AM    Creatinine 0.71 05/25/2022 10:51 AM    BUN/Creatinine ratio 18 05/25/2022 10:51 AM    GFR est AA >60 05/25/2022 10:51 AM    GFR est non-AA >60 05/25/2022 10:51 AM    Calcium 9.4 05/25/2022 10:51 AM       Lab Results   Component Value Date/Time    Cholesterol, total 211 (H) 05/25/2022 10:51 AM    HDL Cholesterol 41 05/25/2022 10:51 AM    LDL, calculated 149.6 (H) 05/25/2022 10:51 AM    VLDL, calculated 20.4 05/25/2022 10:51 AM    Triglyceride 102 05/25/2022 10:51 AM    CHOL/HDL Ratio 5.1 (H) 05/25/2022 10:51 AM       Lab Results   Component Value Date/Time    Hemoglobin A1c 5.5 05/25/2022 10:51 AM    Hemoglobin A1c, External 5.3 11/09/2018 12:00 AM       Assessment/Plan:   1. Hypertension: Bp is falsely elevated today at 141/82. +Losing weight. - C/w rx (given her h/o migraines). RTC in 6 months to reassess her BP    2. Abdominal pain: +H/o suspected kidney stones. - Checking labs. - Checking a CT of her abdomen/pelvis. ORDERS:  - CELIAC ANTIBODY PROFILE; Future  - C REACTIVE PROTEIN, QT; Future  - CALPROTECTIN, FECAL; Future    3. Arthralgia: Will r/o inflammatory arthritis, given her h/o elevated CRP  - Checking labs. ORDERS:  - CINDY COMPREHENSIVE PANEL; Future  - URIC ACID; Future  - RHEUMASSURE; Future    4. Empty sella: Seen on imaging.  - Observation for now. She will have labs drawn by her doctor in [place] (neurologist/chiropractor)    5. Hyperlipidemia and Prediabetes: +Losing weight. Her cholesterol improved!  - Checking labs in 6 months  - C/w a heart healthy low carb diet. 6. Vitamin D deficiency: Her level was elevated. - She cut back on her supplementation but will have her level rechecked with her [place] doctor in February or March        ICD-10-CM ICD-9-CM    1. Hypertension, unspecified type  I10 401.9       2. Abdominal pain, unspecified abdominal location  R10.9 789.00 CELIAC ANTIBODY PROFILE      C REACTIVE PROTEIN, QT      CALPROTECTIN, FECAL      3. Arthralgia, unspecified joint  M25.50 719.40 CINDY COMPREHENSIVE PANEL      URIC ACID      RHEUMASSURE      4. Empty sella (HCC)  E23.6 253.8       5. Hyperlipidemia, unspecified hyperlipidemia type  E78.5 272.4       6. Prediabetes  R73.03 790.29       7. Elevated C-reactive protein (CRP)  R79.82 790.95       8. Vitamin D deficiency  E55.9 268.9       9. Nephrolithiasis  N20.0 592.0 CT ABD PELV W WO CONT      10. Ureteral dilatation  N28.82 593.89 CT ABD PELV W WO CONT      11. Hydronephrosis, unspecified hydronephrosis type  N13.30 591 CT ABD PELV W WO CONT            Health Maintenance Due   Topic Date Due    Hepatitis C Screening  Never done    COVID-19 Vaccine (1) Never done    DTaP/Tdap/Td series (1 - Tdap) Never done    Flu Vaccine (1) Never done         Lab review: labs are reviewed in the EHR and ordered as mentioned above    I have discussed the diagnosis with the patient and the intended plan as seen in the above orders. The patient has received an after-visit summary and questions were answered concerning future plans. I have discussed medication side effects and warnings with the patient as well. I have reviewed the plan of care with the patient, accepted their input and they are in agreement with the treatment goals. All questions were answered. The patient understands the plan of care. Handouts provided today with above information. Pt instructed if symptoms worsen to call the office or report to the ED for continued care. Greater than 50% of the visit time was spent in counseling and/or coordination of care. Voice recognition was used to generate this report, which may have resulted in some phonetic based errors in grammar and contents. Even though attempts were made to correct all the mistakes, some may have been missed, and remained in the body of the document.           Betzy Arzate MD

## 2023-01-05 NOTE — PATIENT INSTRUCTIONS
Home Blood Pressure Test: About This Test  What is it? A home blood pressure test allows you to keep track of your blood pressure at home. Blood pressure is a measure of the force of blood against the walls of your arteries. Blood pressure readings include two numbers, such as 130/80 (say \"130 over 80\"). The first number is the systolic pressure. The second number is the diastolic pressure. Why is this test done? You may do this test at home to:  Find out if you have high blood pressure. Track your blood pressure if you have high blood pressure. Track how well medicine is working to reduce high blood pressure. Check how lifestyle changes, such as weight loss and exercise, are affecting blood pressure. How do you prepare for the test?  For at least 30 minutes before you take your blood pressure, don't exercise, drink caffeine, or smoke. Empty your bladder before the test. Sit quietly with your back straight and both feet on the floor for at least 5 minutes. This helps you take your blood pressure while you feel comfortable and relaxed. How is the test done? If your doctor recommends it, take your blood pressure twice a day. Take it in the morning and evening. Sit with your arm slightly bent and resting on a table so that your upper arm is at the same level as your heart. Use the same arm each time you take your blood pressure. Place the blood pressure cuff on the bare skin of your upper arm. You may have to roll up your sleeve, remove your arm from the sleeve, or take your shirt off. Wrap the blood pressure cuff around your upper arm so that the lower edge of the cuff is about 1 inch above the bend of your elbow. Do not move, talk, or text while you take your blood pressure. Follow the instructions that came with your blood pressure monitor. They might be different from the following. Press the on/off button on the automatic monitor.  Then you may need to wait until the screen says the monitor is ready. Press the start button. The cuff will inflate and deflate by itself. Your blood pressure numbers will appear on the screen. Wait one minute and take your blood pressure again. If your monitor does not automatically save your numbers, write them in your log book, along with the date and time. Follow-up care is a key part of your treatment and safety. Be sure to make and go to all appointments, and call your doctor if you are having problems. It's also a good idea to keep a list of the medicines you take. Where can you learn more? Go to http://www.mcw[facility].com/  Enter C427 in the search box to learn more about \"Home Blood Pressure Test: About This Test.\"  Current as of: January 10, 2022               Content Version: 13.4  © 2006-2022 [employer]. Care instructions adapted under license by [employer] (which disclaims liability or warranty for this information). If you have questions about a medical condition or this instruction, always ask your healthcare professional. [address] any warranty or liability for your use of this information.

## 2023-01-10 ENCOUNTER — HOSPITAL ENCOUNTER (OUTPATIENT)
Dept: LAB | Age: 43
Discharge: HOME | End: 2023-01-10
Payer: COMMERCIAL

## 2023-01-10 DIAGNOSIS — R10.9 ABDOMINAL PAIN, UNSPECIFIED ABDOMINAL LOCATION: ICD-10-CM

## 2023-01-10 DIAGNOSIS — M25.50 ARTHRALGIA, UNSPECIFIED JOINT: ICD-10-CM

## 2023-01-10 LAB
CRP SERPL-MCNC: 0.5 MG/DL (ref 0–0.3)
URATE SERPL-MCNC: 4.8 MG/DL (ref 2.6–7.2)

## 2023-01-10 PROCEDURE — 84550 ASSAY OF BLOOD/URIC ACID: CPT

## 2023-01-10 PROCEDURE — 36415 COLL VENOUS BLD VENIPUNCTURE: CPT

## 2023-01-10 PROCEDURE — 82784 ASSAY IGA/IGD/IGG/IGM EACH: CPT

## 2023-01-10 PROCEDURE — 83520 IMMUNOASSAY QUANT NOS NONAB: CPT

## 2023-01-10 PROCEDURE — 86140 C-REACTIVE PROTEIN: CPT

## 2023-01-10 PROCEDURE — 86225 DNA ANTIBODY NATIVE: CPT

## 2023-01-11 ENCOUNTER — HOSPITAL ENCOUNTER (OUTPATIENT)
Dept: LAB | Age: 43
Discharge: HOME | End: 2023-01-11
Payer: COMMERCIAL

## 2023-01-11 LAB
CENTROMERE B AB SER-ACNC: <0.2 AI (ref 0–0.9)
CHROMATIN AB SERPL-ACNC: <0.2 AI (ref 0–0.9)
DSDNA AB SER-ACNC: 1 IU/ML (ref 0–9)
ENA JO1 AB SER-ACNC: <0.2 AI (ref 0–0.9)
ENA RNP AB SER-ACNC: <0.2 AI (ref 0–0.9)
ENA SCL70 AB SER-ACNC: <0.2 AI (ref 0–0.9)
ENA SM AB SER-ACNC: <0.2 AI (ref 0–0.9)
ENA SS-A AB SER-ACNC: <0.2 AI (ref 0–0.9)
ENA SS-B AB SER-ACNC: <0.2 AI (ref 0–0.9)
SEE BELOW, 164869: NORMAL

## 2023-01-11 PROCEDURE — 83993 ASSAY FOR CALPROTECTIN FECAL: CPT

## 2023-01-12 LAB
GLIADIN PEPTIDE IGA SER-ACNC: 4 UNITS (ref 0–19)
GLIADIN PEPTIDE IGG SER-ACNC: 2 UNITS (ref 0–19)
IGA SERPL-MCNC: 204 MG/DL (ref 87–352)
TTG IGA SER-ACNC: <2 U/ML (ref 0–3)
TTG IGG SER-ACNC: <2 U/ML (ref 0–5)

## 2023-01-13 ENCOUNTER — HOSPITAL ENCOUNTER (OUTPATIENT)
Dept: CT IMAGING | Age: 43
End: 2023-01-13
Attending: INTERNAL MEDICINE
Payer: COMMERCIAL

## 2023-01-13 DIAGNOSIS — N20.0 NEPHROLITHIASIS: ICD-10-CM

## 2023-01-13 DIAGNOSIS — N13.30 HYDRONEPHROSIS, UNSPECIFIED HYDRONEPHROSIS TYPE: ICD-10-CM

## 2023-01-13 DIAGNOSIS — N28.82 URETERAL DILATATION: ICD-10-CM

## 2023-01-13 LAB
CALPROTECTIN STL-MCNT: <16 UG/G (ref 0–120)
CREAT UR-MCNC: 0.7 MG/DL (ref 0.6–1.3)

## 2023-01-13 PROCEDURE — 82565 ASSAY OF CREATININE: CPT

## 2023-01-13 PROCEDURE — 74011000636 HC RX REV CODE- 636: Performed by: INTERNAL MEDICINE

## 2023-01-13 PROCEDURE — 74178 CT ABD&PLV WO CNTR FLWD CNTR: CPT

## 2023-01-13 RX ADMIN — IOPAMIDOL 100 ML: 755 INJECTION, SOLUTION INTRAVENOUS at 13:25

## 2023-01-15 LAB
14.3.3 ETA, RHEUM. ARTHRITIS: <0.2 NG/ML
CCP IGA+IGG SERPL IA-ACNC: NORMAL UNITS
RHEUMATOID FACT SERPL-ACNC: <14 UNITS/ML

## 2023-01-16 ENCOUNTER — TELEPHONE (OUTPATIENT)
Dept: INTERNAL MEDICINE CLINIC | Age: 43
End: 2023-01-16

## 2023-01-16 NOTE — TELEPHONE ENCOUNTER
----- Message from Mala Ortega MD sent at 1/16/2023  8:31 AM EST -----  Please let her know that there is no evidence of inflammatory bowel disease per her normal stool test.    Dr. Tomas Booth  [facility] [address], [address] [place] [address].  Phone: [phone]  Fax: [phone]

## 2023-01-18 ENCOUNTER — OFFICE VISIT (OUTPATIENT)
Dept: NEUROLOGY | Age: 43
End: 2023-01-18
Payer: COMMERCIAL

## 2023-01-18 VITALS
BODY MASS INDEX: 35.46 KG/M2 | WEIGHT: 234 LBS | OXYGEN SATURATION: 96 % | HEART RATE: 81 BPM | SYSTOLIC BLOOD PRESSURE: 120 MMHG | DIASTOLIC BLOOD PRESSURE: 66 MMHG | HEIGHT: 68 IN | RESPIRATION RATE: 18 BRPM

## 2023-01-18 DIAGNOSIS — E23.6 EMPTY SELLA (HCC): ICD-10-CM

## 2023-01-18 DIAGNOSIS — G43.809 OTHER MIGRAINE WITHOUT STATUS MIGRAINOSUS, NOT INTRACTABLE: ICD-10-CM

## 2023-01-18 DIAGNOSIS — G43.019 INTRACTABLE MIGRAINE WITHOUT AURA AND WITHOUT STATUS MIGRAINOSUS: Primary | ICD-10-CM

## 2023-01-18 RX ORDER — RIMEGEPANT SULFATE 75 MG/75MG
TABLET, ORALLY DISINTEGRATING ORAL
Qty: 8 TABLET | Refills: 6 | Status: SHIPPED | OUTPATIENT
Start: 2023-01-18

## 2023-01-18 RX ORDER — MULTIVITAMIN
1 TABLET ORAL DAILY
COMMUNITY

## 2023-01-18 NOTE — PROGRESS NOTES
[address] [address]. [facility], [address]  Office:  [phone]  Fax: [phone]  Chief Complaint   Patient presents with    Migraine     Follow up. HPI: Kristen Echols presents in follow-up. She has history of headaches since a traumatic brain injury in 1998. She was last seen on 8/2/2022. She was off preventatives but is on the low-dose of metoprolol for blood pressure. At that time she was not having many of the bad migraines per month but was having some sort of headache most days out of the month. She reported over 10 years ago she had a history of headaches associated with sexual activity, and these restarted around January 2022. MRI and MRA to be obtained to evaluate for secondary cause. We will obtain open MRI as she cannot tolerate MRI due to claustrophobia. Continued Nurtec for acute treatment. MRI brain 9/26/22 and MRA:  MRI normal but empty sella, most commonly anatomic variant, correlate likely to exclude symptoms of intracranial hypertension. MRA normal.     She presents today in follow-up. She is seeing a doctor for empty sella- [facility] [place]. He is at [facility]. He ordered MRI spine. She is seeing him because of spine/neck pain. Was there twice, sister lives there. Did not have adjustments. She is due for eye exam.  Sees a local eye doctor. No vision changes, doesn't wear glasses as much. Her HAs have been a lot better the past couple months. Having to use the Nurtec less, still works great, can still function with it. Didn't take it for 2 weeks then had to take it twice in a week related to increased migraines during a stressful period of time. Had bad side effects from triptans.     Past Medical History:   Diagnosis Date    Adverse effect of anesthesia     HX OF COLLAPSED LUNG    Benign tumor of cervix     Calculus of kidney     Elevated BP without diagnosis of hypertension Empty sella syndrome ([facility] [address]     Endometriosis     Fracture of finger of right hand 2018    5th digit    H/O thoracic outlet syndrome     Headache     Hyperlipidemia 2023    Hypertension     Ill-defined condition     OVARIAN CYST    Ill-defined condition     COLLAPSED LUNG    Migraine headache     Nausea & vomiting     Thyroid disease     hypothyroidism    Trauma        Past Surgical History:   Procedure Laterality Date    HX BREAST REDUCTION      bilateral    HX  SECTION      x2    HX CHOLECYSTECTOMY      HX DILATION AND CURETTAGE      x3    HX HYSTERECTOMY  2015    HX OTHER SURGICAL  2017    REMOVED EXTRA RIB IN SPINE    HX PARTIAL HYSTERECTOMY      HX PELVIC LAPAROSCOPY      MA UNLISTED PROCEDURE VASCULAR SURGERY         Current Outpatient Medications   Medication Sig Dispense Refill    multivitamin (ONE A DAY) tablet Take 1 Tablet by mouth daily. rimegepant (Nurtec ODT) 75 mg disintegrating tablet TAKE 1 TAB BY MOUTH AS NEEDED FOR MIGRAINE.  MAX 1 DOSE IN 24 HOURS. 8 Tablet 6    metoprolol succinate (TOPROL-XL) 25 mg XL tablet TAKE 1 TABLET BY MOUTH EVERY DAY 90 Tablet 0    EPINEPHrine (EPIPEN) 0.3 mg/0.3 mL injection           Allergies   Allergen Reactions    Codeine Hives, Itching, Swelling and Anaphylaxis    Emgality Pen [Galcanezumab-Gnlm] Rash    Norvasc [Amlodipine] Other (comments)     BLE edema    Shellfish Derived Shortness of Breath       Social History     Tobacco Use    Smoking status: Never    Smokeless tobacco: Never   Vaping Use    Vaping Use: Never used   Substance Use Topics    Alcohol use: Not Currently     Alcohol/week: 0.0 standard drinks     Comment: Occasionally    Drug use: Never       Family History   Problem Relation Age of Onset    Migraines Mother     Hypertension Mother     Cancer Father         Non Hodgens lymphoma    Heart Disease Father     Heart Attack Father     No Known Problems Sister     Diabetes Maternal Grandmother     Dementia Maternal Grandmother No Known Problems Maternal Grandfather     No Known Problems Paternal Grandmother     No Known Problems Paternal Grandfather     Migraines Son     Migraines Daughter        Review of Systems:  GENERAL: Denies fever or fatigue  CARDIAC: No CP or SOB  PULMONARY: No cough or SOB  MUSCULOSKELETAL: No new joint pain  NEURO: SEE HPI    Physical Examination:  Visit Vitals  /66   Pulse 81   Resp 18   Ht 5' 8\" (1.727 m)   Wt 106.1 kg (234 lb)   SpO2 96%   BMI 35.58 kg/m²       Alert, in NAD. Heart is regular. Oriented x3. Speech is fluent. Speech clear. EOMs are full, PERRL, VFFTC, no nystagmus. No facial asymmetry. Tongue is midline. Strength and tone are normal. No drift of the bilateral upper extremities. Fine finger movements symmetrical. FNF intact bilaterally. DTRs +2, gait symmetric. Impression/Plan: This is a 48-year-old right-handed female who presents in follow-up for migraines. She has risk factors including head trauma many years ago and family history of migraines. She has been on several prophylactic medications in the past including Topamax, Inderal, and Botox. More recently she was on Emgality but had side effects of injection site reaction which was intolerable. She is currently off preventatives but is on a low dose of metoprolol for blood pressure. She uses Nurtec for acute treatment which works well. We will continue the current regimen. Recommended eye exam to evaluate for papilledema and send notes to us. Follow up in 6 months. Diagnoses and all orders for this visit:    1. Intractable migraine without aura and without status migrainosus  -     REFERRAL TO OPHTHALMOLOGY    2. Empty sella (HCC)  -     REFERRAL TO OPHTHALMOLOGY    3. Other migraine without status migrainosus, not intractable  -     rimegepant (Nurtec ODT) 75 mg disintegrating tablet; TAKE 1 TAB BY MOUTH AS NEEDED FOR MIGRAINE. MAX 1 DOSE IN 24 HOURS.       Signed By: Sara Phillips NP        PLEASE NOTE: Portions of this document may have been produced using voice recognition software. Unrecognized errors in transcription may be present.

## 2023-01-19 NOTE — PROGRESS NOTES
Please let her know that her CT urogram study results show tiny bilateral kidney stones and diverticulosis (from having been constipated in the past). She should maintain a high-fiber diet and stay hydrated. No additional studies or treatments are warranted for these findings.     Dr. Alfie Gabriel  [facility] [address]  Phone: [phone]  Fax: [phone]

## 2023-01-20 DIAGNOSIS — R19.00 PELVIC MASS: ICD-10-CM

## 2023-01-20 DIAGNOSIS — R19.00 PELVIC MASS: Primary | ICD-10-CM

## 2023-01-20 NOTE — PROGRESS NOTES
I discussed her results with her. She will get a transvaginal ultrasound to investigate the potential remnant of uterine tissue, seen on imaging.     Dr. Maximino Cortes  [facility] [address]  Phone: [phone]  Fax: [phone]

## 2023-01-27 ENCOUNTER — HOSPITAL ENCOUNTER (OUTPATIENT)
Dept: ULTRASOUND IMAGING | Age: 43
Discharge: HOME | End: 2023-01-27
Attending: INTERNAL MEDICINE
Payer: COMMERCIAL

## 2023-01-27 DIAGNOSIS — R19.00 PELVIC MASS: ICD-10-CM

## 2023-01-27 PROCEDURE — 93975 VASCULAR STUDY: CPT

## 2023-02-03 ENCOUNTER — TELEPHONE (OUTPATIENT)
Dept: INTERNAL MEDICINE CLINIC | Age: 43
End: 2023-02-03

## 2023-02-03 NOTE — TELEPHONE ENCOUNTER
----- Message from Sulma Shanks MD sent at 2/3/2023 10:27 AM EST -----  Please let her know that her transvaginal ultrasound does not show any pelvic mass.     Dr. Rosy Palm  [facility] [address], [address] [place] [address].  Phone: [phone]  Fax: [phone]

## 2023-02-08 ENCOUNTER — OFFICE VISIT (OUTPATIENT)
Dept: CARDIOLOGY CLINIC | Age: 43
End: 2023-02-08
Payer: COMMERCIAL

## 2023-02-08 VITALS
DIASTOLIC BLOOD PRESSURE: 96 MMHG | WEIGHT: 233 LBS | SYSTOLIC BLOOD PRESSURE: 156 MMHG | HEART RATE: 68 BPM | HEIGHT: 68 IN | OXYGEN SATURATION: 98 % | BODY MASS INDEX: 35.31 KG/M2

## 2023-02-08 DIAGNOSIS — R07.9 CHEST PAIN, UNSPECIFIED TYPE: Primary | ICD-10-CM

## 2023-02-08 DIAGNOSIS — R03.0 BORDERLINE HYPERTENSION: ICD-10-CM

## 2023-02-08 DIAGNOSIS — Z82.49 FAMILY HISTORY OF PREMATURE CAD: Primary | ICD-10-CM

## 2023-02-08 DIAGNOSIS — Z82.49 FAMILY HISTORY OF PREMATURE CAD: ICD-10-CM

## 2023-02-08 DIAGNOSIS — R07.9 CHEST PAIN, UNSPECIFIED TYPE: ICD-10-CM

## 2023-02-08 NOTE — PROGRESS NOTES
Shruthi Corona presents today for   Chief Complaint   Patient presents with    Follow-up     1 year follow up. Chest Pain     Center chest pressure that radiates to left arm causing to fall asleep. Pt states comes and goes. Dizziness     Dizzy spells due to scratched rt eye. Austin Rubio preferred language for health care discussion is english/other. Is someone accompanying this pt? no    Is the patient using any DME equipment during [address]? no    Depression Screening:  3 most recent PHQ Screens 2/8/2023   Little interest or pleasure in doing things Not at all   Feeling down, depressed, irritable, or hopeless Not at all   Total Score PHQ 2 0       Learning Assessment:  Learning Assessment 2/8/2023   PRIMARY LEARNER Patient   HIGHEST LEVEL OF EDUCATION - PRIMARY LEARNER  -   BARRIERS PRIMARY LEARNER -   CO-LEARNER CAREGIVER -   PRIMARY LANGUAGE ENGLISH   LEARNER PREFERENCE PRIMARY DEMONSTRATION   ANSWERED BY patient   RELATIONSHIP SELF       Abuse Screening:  Abuse Screening Questionnaire 2/8/2023   Do you ever feel afraid of your partner? N   Are you in a relationship with someone who physically or mentally threatens you? N   Is it safe for you to go home? Y       Fall Risk  No flowsheet data found. Pt currently taking Anticoagulant therapy? no    Pt currently taking Antiplatelet therapy ? no      Coordination of Care:  1. Have you been to the ER, urgent care clinic since your last visit? Hospitalized since your last visit? no    2. Have you seen or consulted any other health care providers outside of the [address] [place] [address] since your last visit? Include any pap smears or colon screening.  no

## 2023-02-08 NOTE — PROGRESS NOTES
Mikal Box    Chief Complaint   Patient presents with    Follow-up     1 year follow up. Chest Pain     Center chest pressure that radiates to left arm causing to fall asleep. Pt states comes and goes. Dizziness     Dizzy spells due to scratched rt eye. HTN    HPI    Mikal Box is a 36 y.o. heart disease here for evaluation of hypertension. As you know this patient was referred to my office back in 2018 and saw my partner for evaluation of murmur. This led to a stress echocardiogram which I independently obtained and reviewed the report with her today and was completely normal.  She had no significant valvular pathology. Does have a known history of thoracic outlet syndrome but most recently has been dealing with issues status post an orthopedic injury to her right ankle as well as recurrent migraines that have sent her to the ER multiple times. Besides her PCP, as you know, she follows with neurology locally and a neuro-chiropractor in [address] [place] [address] for empty sella/ presumed hypopit. She has a lot of complaints today, incl her left arm going numb, chest pain that comes and goes- mostly a pressure at rest. She can feel dizzy but feels due to scratch on her eye/ current issues with vision.     CV RFs: +FH her father  of complications of CA but says he had CABG in his 45s    Past Medical History:   Diagnosis Date    Adverse effect of anesthesia     HX OF COLLAPSED LUNG    Benign tumor of cervix     Calculus of kidney     Elevated BP without diagnosis of hypertension     Empty sella syndrome (HCC)     Endometriosis     Fracture of finger of right hand 2018    5th digit    H/O thoracic outlet syndrome     Headache     Hyperlipidemia 2023    Hypertension     Ill-defined condition     OVARIAN CYST    Ill-defined condition     COLLAPSED LUNG    Migraine headache     Nausea & vomiting     Thyroid disease     hypothyroidism    Trauma        Past Surgical History:   Procedure Laterality Date HX BREAST REDUCTION      bilateral    HX  SECTION      x2    HX CHOLECYSTECTOMY      HX DILATION AND CURETTAGE      x3    HX HYSTERECTOMY  2015    HX OTHER SURGICAL  2017    REMOVED EXTRA RIB IN SPINE    HX PARTIAL HYSTERECTOMY      HX PELVIC LAPAROSCOPY      AL UNLISTED PROCEDURE VASCULAR SURGERY         Current Outpatient Medications   Medication Sig Dispense Refill    multivitamin (ONE A DAY) tablet Take 1 Tablet by mouth daily. rimegepant (Nurtec ODT) 75 mg disintegrating tablet TAKE 1 TAB BY MOUTH AS NEEDED FOR MIGRAINE. MAX 1 DOSE IN 24 HOURS. 8 Tablet 6    metoprolol succinate (TOPROL-XL) 25 mg XL tablet TAKE 1 TABLET BY MOUTH EVERY DAY 90 Tablet 0    EPINEPHrine (EPIPEN) 0.3 mg/0.3 mL injection 0.3 mg by IntraMUSCular route once as needed.          Allergies   Allergen Reactions    Codeine Hives, Itching, Swelling and Anaphylaxis    Emgality Pen [Galcanezumab-Gnlm] Rash    Norvasc [Amlodipine] Other (comments)     BLE edema    Shellfish Derived Shortness of Breath       Social History     Socioeconomic History    Marital status:      Spouse name: Not on file    Number of children: Not on file    Years of education: Not on file    Highest education level: Not on file   Occupational History    Not on file   Tobacco Use    Smoking status: Never    Smokeless tobacco: Never   Vaping Use    Vaping Use: Never used   Substance and Sexual Activity    Alcohol use: Not Currently     Alcohol/week: 0.0 standard drinks     Comment: Occasionally    Drug use: Never    Sexual activity: Yes     Partners: Male     Birth control/protection: None   Other Topics Concern    Not on file   Social History Narrative    Not on file     Social Determinants of Health     Financial Resource Strain: Not on file   Food Insecurity: Not on file   Transportation Needs: Not on file   Physical Activity: Insufficiently Active    Days of Exercise per Week: 5 days    Minutes of Exercise per Session: 20 min   Stress: Not on file Social Connections: Not on file   Intimate Partner Violence: Not At Risk    Fear of Current or Ex-Partner: No    Emotionally Abused: No    Physically Abused: No    Sexually Abused: No   Housing Stability: Not on file    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 Wt Readings from Last 3 Encounters:   02/08/23 105.7 kg (233 lb)   01/18/23 106.1 kg (234 lb)   01/05/23 107 kg (236 lb)     Temp Readings from Last 3 Encounters:   01/05/23 98.4 °F (36.9 °C) (Temporal)   08/02/22 99.1 °F (37.3 °C)   06/01/22 97.7 °F (36.5 °C) (Temporal)     BP Readings from Last 3 Encounters:   02/08/23 (!) 148/90   01/18/23 120/66   01/05/23 (!) 141/82     Pulse Readings from Last 3 Encounters:   02/08/23 68   01/18/23 81   01/05/23 78       12/27/18   ECHO ADULT COMPLETE 12/27/2018 12/27/2018    Narrative · Estimated left ventricular ejection fraction is 56 - 60%. Visually   measured ejection fraction. Normal left ventricular wall motion, no   regional wall motion abnormality noted. · There is no evidence of pulmonary hypertension. Signed by: Mitzy Bentley, DO       Physical Exam:    Visit Vitals  BP (!) 148/90 (BP 1 Location: Left upper arm, BP Patient Position: Sitting, BP Cuff Size: Large adult)   Pulse 68   Ht 5' 8\" (1.727 m)   Wt 105.7 kg (233 lb)   SpO2 98%   BMI 35.43 kg/m²      Physical Exam  HENT:      Head: Normocephalic and atraumatic. Eyes:      Pupils: Pupils are equal, round, and reactive to light. Cardiovascular:      Rate and Rhythm: Normal rate and regular rhythm. Heart sounds: Normal heart sounds. No murmur heard. No friction rub. No gallop. Pulmonary:      Effort: Pulmonary effort is normal. No respiratory distress. Breath sounds: Normal breath sounds. No wheezing or rales.    Chest:      Chest wall: No tenderness. Abdominal:      General: Bowel sounds are normal.      Palpations: Abdomen is soft. Musculoskeletal:         General: No tenderness. Skin:     General: Skin is warm and dry. Neurological:      Mental Status: She is alert and oriented to person, place, and time. EKG today shows: NSR, normal axis and intervals, no ST segment abnormalities    Lab Results   Component Value Date/Time    Cholesterol, total 211 (H) 05/25/2022 10:51 AM    HDL Cholesterol 41 05/25/2022 10:51 AM    LDL, calculated 149.6 (H) 05/25/2022 10:51 AM    VLDL, calculated 20.4 05/25/2022 10:51 AM    Triglyceride 102 05/25/2022 10:51 AM    CHOL/HDL Ratio 5.1 (H) 05/25/2022 10:51 AM     Impression and Plan:  Den Harper is a 36 y.o. with:    1.) HTN, avg SBP goal <140  2.) Normal stress echo 2018  3.) Migraines, greatly improved after eliminating soy  4.) +FH premature ASCVD  5.) H/o thoracic outlet, known  6.) Atypical CP, likely noncardiac    Stress echo for recurrent CP, in setting of her RFs and +FH premature ASCVD  If low risk, can see me yearly, can consider CAC in future to help reclassify her risk    Thank you for allowing me to participate in the care of your patient, please do not hesitate to call with questions or concerns.     [address],    [facility], DO

## 2023-03-01 DIAGNOSIS — R07.9 CHEST PAIN, UNSPECIFIED TYPE: Primary | ICD-10-CM

## 2023-03-09 DIAGNOSIS — I10 ESSENTIAL (PRIMARY) HYPERTENSION: ICD-10-CM

## 2023-03-10 RX ORDER — METOPROLOL SUCCINATE 25 MG/1
TABLET, EXTENDED RELEASE ORAL
Qty: 90 TABLET | Refills: 2 | Status: SHIPPED | OUTPATIENT
Start: 2023-03-10

## 2023-04-06 RX ORDER — EPINEPHRINE 0.3 MG/.3ML
INJECTION SUBCUTANEOUS
Qty: 1 EACH | Refills: 2 | Status: SHIPPED | OUTPATIENT
Start: 2023-04-06

## 2023-07-06 ENCOUNTER — OFFICE VISIT (OUTPATIENT)
Age: 43
End: 2023-07-06
Payer: COMMERCIAL

## 2023-07-06 VITALS
HEART RATE: 68 BPM | RESPIRATION RATE: 14 BRPM | SYSTOLIC BLOOD PRESSURE: 144 MMHG | OXYGEN SATURATION: 98 % | HEIGHT: 68 IN | TEMPERATURE: 97.6 F | DIASTOLIC BLOOD PRESSURE: 87 MMHG | BODY MASS INDEX: 37.89 KG/M2 | WEIGHT: 250 LBS

## 2023-07-06 DIAGNOSIS — R73.03 PREDIABETES: ICD-10-CM

## 2023-07-06 DIAGNOSIS — I10 ESSENTIAL (PRIMARY) HYPERTENSION: Primary | ICD-10-CM

## 2023-07-06 DIAGNOSIS — R51.9 CHRONIC NONINTRACTABLE HEADACHE, UNSPECIFIED HEADACHE TYPE: ICD-10-CM

## 2023-07-06 DIAGNOSIS — E66.01 SEVERE OBESITY (HCC): ICD-10-CM

## 2023-07-06 DIAGNOSIS — G89.29 CHRONIC NONINTRACTABLE HEADACHE, UNSPECIFIED HEADACHE TYPE: ICD-10-CM

## 2023-07-06 PROCEDURE — 99214 OFFICE O/P EST MOD 30 MIN: CPT | Performed by: INTERNAL MEDICINE

## 2023-07-06 PROCEDURE — 3078F DIAST BP <80 MM HG: CPT | Performed by: INTERNAL MEDICINE

## 2023-07-06 PROCEDURE — 3074F SYST BP LT 130 MM HG: CPT | Performed by: INTERNAL MEDICINE

## 2023-07-06 SDOH — ECONOMIC STABILITY: HOUSING INSECURITY
IN THE LAST 12 MONTHS, WAS THERE A TIME WHEN YOU DID NOT HAVE A STEADY PLACE TO SLEEP OR SLEPT IN A SHELTER (INCLUDING NOW)?: NO

## 2023-07-06 SDOH — ECONOMIC STABILITY: FOOD INSECURITY: WITHIN THE PAST 12 MONTHS, THE FOOD YOU BOUGHT JUST DIDN'T LAST AND YOU DIDN'T HAVE MONEY TO GET MORE.: NEVER TRUE

## 2023-07-06 SDOH — ECONOMIC STABILITY: FOOD INSECURITY: WITHIN THE PAST 12 MONTHS, YOU WORRIED THAT YOUR FOOD WOULD RUN OUT BEFORE YOU GOT MONEY TO BUY MORE.: NEVER TRUE

## 2023-07-06 SDOH — ECONOMIC STABILITY: INCOME INSECURITY: HOW HARD IS IT FOR YOU TO PAY FOR THE VERY BASICS LIKE FOOD, HOUSING, MEDICAL CARE, AND HEATING?: NOT HARD AT ALL

## 2023-07-06 ASSESSMENT — ANXIETY QUESTIONNAIRES
7. FEELING AFRAID AS IF SOMETHING AWFUL MIGHT HAPPEN: 0
1. FEELING NERVOUS, ANXIOUS, OR ON EDGE: 0
GAD7 TOTAL SCORE: 0
6. BECOMING EASILY ANNOYED OR IRRITABLE: 0
3. WORRYING TOO MUCH ABOUT DIFFERENT THINGS: 0
4. TROUBLE RELAXING: 0
2. NOT BEING ABLE TO STOP OR CONTROL WORRYING: 0
5. BEING SO RESTLESS THAT IT IS HARD TO SIT STILL: 0

## 2023-07-06 ASSESSMENT — PATIENT HEALTH QUESTIONNAIRE - PHQ9
SUM OF ALL RESPONSES TO PHQ QUESTIONS 1-9: 0
1. LITTLE INTEREST OR PLEASURE IN DOING THINGS: 0
SUM OF ALL RESPONSES TO PHQ QUESTIONS 1-9: 0
SUM OF ALL RESPONSES TO PHQ9 QUESTIONS 1 & 2: 0
2. FEELING DOWN, DEPRESSED OR HOPELESS: 0

## 2023-07-12 ASSESSMENT — ENCOUNTER SYMPTOMS
SORE THROAT: 0
BLOOD IN STOOL: 0
COUGH: 0
ABDOMINAL PAIN: 0
SHORTNESS OF BREATH: 0
ANAL BLEEDING: 0
EYE PAIN: 0

## 2023-07-17 ENCOUNTER — HOSPITAL ENCOUNTER (OUTPATIENT)
Facility: HOSPITAL | Age: 43
Discharge: HOME | End: 2023-07-20
Payer: COMMERCIAL

## 2023-07-17 ENCOUNTER — TELEPHONE (OUTPATIENT)
Age: 43
End: 2023-07-17

## 2023-07-17 DIAGNOSIS — R73.9 HYPERGLYCEMIA: Primary | ICD-10-CM

## 2023-07-17 DIAGNOSIS — G89.29 CHRONIC NONINTRACTABLE HEADACHE, UNSPECIFIED HEADACHE TYPE: ICD-10-CM

## 2023-07-17 DIAGNOSIS — I10 ESSENTIAL (PRIMARY) HYPERTENSION: ICD-10-CM

## 2023-07-17 DIAGNOSIS — R51.9 CHRONIC NONINTRACTABLE HEADACHE, UNSPECIFIED HEADACHE TYPE: ICD-10-CM

## 2023-07-17 DIAGNOSIS — R63.5 WEIGHT GAIN, ABNORMAL: ICD-10-CM

## 2023-07-17 DIAGNOSIS — R73.03 PREDIABETES: ICD-10-CM

## 2023-07-17 DIAGNOSIS — E66.01 SEVERE OBESITY (HCC): ICD-10-CM

## 2023-07-17 LAB
ALBUMIN SERPL-MCNC: 3.9 G/DL (ref 3.4–5)
ALBUMIN/GLOB SERPL: 1.2 (ref 0.8–1.7)
ALP SERPL-CCNC: 59 U/L (ref 45–117)
ALT SERPL-CCNC: 22 U/L (ref 13–56)
ANION GAP SERPL CALC-SCNC: 3 MMOL/L (ref 3–18)
AST SERPL-CCNC: 12 U/L (ref 10–38)
BASOPHILS # BLD: 0 K/UL (ref 0–0.1)
BASOPHILS NFR BLD: 0 % (ref 0–2)
BILIRUB SERPL-MCNC: 0.2 MG/DL (ref 0.2–1)
BUN SERPL-MCNC: 11 MG/DL (ref 7–18)
BUN/CREAT SERPL: 15 (ref 12–20)
CALCIUM SERPL-MCNC: 9.5 MG/DL (ref 8.5–10.1)
CHLORIDE SERPL-SCNC: 103 MMOL/L (ref 100–111)
CHOLEST SERPL-MCNC: 192 MG/DL
CO2 SERPL-SCNC: 33 MMOL/L (ref 21–32)
CREAT SERPL-MCNC: 0.73 MG/DL (ref 0.6–1.3)
CREAT UR-MCNC: 24 MG/DL (ref 30–125)
DIFFERENTIAL METHOD BLD: ABNORMAL
EOSINOPHIL # BLD: 0.3 K/UL (ref 0–0.4)
EOSINOPHIL NFR BLD: 3 % (ref 0–5)
ERYTHROCYTE [DISTWIDTH] IN BLOOD BY AUTOMATED COUNT: 12.1 % (ref 11.6–14.5)
EST. AVERAGE GLUCOSE BLD GHB EST-MCNC: 114 MG/DL
GLOBULIN SER CALC-MCNC: 3.2 G/DL (ref 2–4)
GLUCOSE SERPL-MCNC: 98 MG/DL (ref 74–99)
HBA1C MFR BLD: 5.6 % (ref 4.2–5.6)
HCT VFR BLD AUTO: 39.4 % (ref 35–45)
HDLC SERPL-MCNC: 43 MG/DL (ref 40–60)
HDLC SERPL: 4.5 (ref 0–5)
HGB BLD-MCNC: 13.4 G/DL (ref 12–16)
IMM GRANULOCYTES # BLD AUTO: 0.1 K/UL (ref 0–0.04)
IMM GRANULOCYTES NFR BLD AUTO: 1 % (ref 0–0.5)
LDLC SERPL CALC-MCNC: 111.6 MG/DL (ref 0–100)
LIPID PANEL: ABNORMAL
LYMPHOCYTES # BLD: 1.8 K/UL (ref 0.9–3.6)
LYMPHOCYTES NFR BLD: 20 % (ref 21–52)
MCH RBC QN AUTO: 31.6 PG (ref 24–34)
MCHC RBC AUTO-ENTMCNC: 34 G/DL (ref 31–37)
MCV RBC AUTO: 92.9 FL (ref 78–100)
MICROALBUMIN UR-MCNC: 0.6 MG/DL (ref 0–3)
MICROALBUMIN/CREAT UR-RTO: 25 MG/G (ref 0–30)
MONOCYTES # BLD: 0.7 K/UL (ref 0.05–1.2)
MONOCYTES NFR BLD: 8 % (ref 3–10)
NEUTS SEG # BLD: 6.2 K/UL (ref 1.8–8)
NEUTS SEG NFR BLD: 68 % (ref 40–73)
NRBC # BLD: 0 K/UL (ref 0–0.01)
NRBC BLD-RTO: 0 PER 100 WBC
PLATELET # BLD AUTO: 427 K/UL (ref 135–420)
PMV BLD AUTO: 8.6 FL (ref 9.2–11.8)
POTASSIUM SERPL-SCNC: 4 MMOL/L (ref 3.5–5.5)
PROT SERPL-MCNC: 7.1 G/DL (ref 6.4–8.2)
RBC # BLD AUTO: 4.24 M/UL (ref 4.2–5.3)
SODIUM SERPL-SCNC: 139 MMOL/L (ref 136–145)
T4 FREE SERPL-MCNC: 0.9 NG/DL (ref 0.7–1.5)
TRIGL SERPL-MCNC: 187 MG/DL
TSH SERPL DL<=0.05 MIU/L-ACNC: 3.07 UIU/ML (ref 0.36–3.74)
VLDLC SERPL CALC-MCNC: 37.4 MG/DL
WBC # BLD AUTO: 9.1 K/UL (ref 4.6–13.2)

## 2023-07-17 PROCEDURE — 36415 COLL VENOUS BLD VENIPUNCTURE: CPT

## 2023-07-17 PROCEDURE — 82570 ASSAY OF URINE CREATININE: CPT

## 2023-07-17 PROCEDURE — 83036 HEMOGLOBIN GLYCOSYLATED A1C: CPT

## 2023-07-17 PROCEDURE — 80061 LIPID PANEL: CPT

## 2023-07-17 PROCEDURE — 84443 ASSAY THYROID STIM HORMONE: CPT

## 2023-07-17 PROCEDURE — 82043 UR ALBUMIN QUANTITATIVE: CPT

## 2023-07-17 PROCEDURE — 84439 ASSAY OF FREE THYROXINE: CPT

## 2023-07-17 PROCEDURE — 85025 COMPLETE CBC W/AUTO DIFF WBC: CPT

## 2023-07-17 PROCEDURE — 80053 COMPREHEN METABOLIC PANEL: CPT

## 2023-07-17 NOTE — TELEPHONE ENCOUNTER
Please let her know that her urine cortisol level lab test was added to her chart.   Please have her complete this last test.    Dr. Gume Singleton  [facility] of [address] [place] [address]  Phone: [phone]  Fax: [phone]

## 2023-07-17 NOTE — TELEPHONE ENCOUNTER
Patient stating at her last appt Dr. Stephanie Estrada was ordering labs which was to include a 24 hour urine to check her Cortisol Levels. She had all of her other labs done but the 24 hour urine order is not in the system. They went ahead and gave her the kit but she doesn't want to start it until she knows the order is in.

## 2023-07-20 ENCOUNTER — HOSPITAL ENCOUNTER (OUTPATIENT)
Facility: HOSPITAL | Age: 43
Discharge: HOME | End: 2023-07-20
Payer: COMMERCIAL

## 2023-07-20 DIAGNOSIS — I10 ESSENTIAL (PRIMARY) HYPERTENSION: ICD-10-CM

## 2023-07-20 DIAGNOSIS — R73.9 HYPERGLYCEMIA: ICD-10-CM

## 2023-07-20 DIAGNOSIS — R63.5 WEIGHT GAIN, ABNORMAL: ICD-10-CM

## 2023-07-20 PROCEDURE — 82530 CORTISOL FREE: CPT

## 2023-07-24 LAB
COLLECT DURATION TIME UR: 24 HR
CORTIS F 24H UR-MRATE: 30 UG/24 HR (ref 6–42)
CORTIS F UR-MCNC: 14 UG/L
SPECIMEN VOL ?TM UR: 2150 ML

## 2023-08-17 ENCOUNTER — OFFICE VISIT (OUTPATIENT)
Age: 43
End: 2023-08-17
Payer: COMMERCIAL

## 2023-08-17 VITALS
SYSTOLIC BLOOD PRESSURE: 122 MMHG | HEART RATE: 75 BPM | BODY MASS INDEX: 37.28 KG/M2 | DIASTOLIC BLOOD PRESSURE: 80 MMHG | HEIGHT: 68 IN | RESPIRATION RATE: 18 BRPM | WEIGHT: 246 LBS

## 2023-08-17 DIAGNOSIS — G43.019 MIGRAINE WITHOUT AURA, INTRACTABLE, WITHOUT STATUS MIGRAINOSUS: Primary | ICD-10-CM

## 2023-08-17 PROCEDURE — 99213 OFFICE O/P EST LOW 20 MIN: CPT | Performed by: NURSE PRACTITIONER

## 2023-08-17 PROCEDURE — 3074F SYST BP LT 130 MM HG: CPT | Performed by: NURSE PRACTITIONER

## 2023-08-17 PROCEDURE — 3078F DIAST BP <80 MM HG: CPT | Performed by: NURSE PRACTITIONER

## 2023-08-17 RX ORDER — RIMEGEPANT SULFATE 75 MG/75MG
TABLET, ORALLY DISINTEGRATING ORAL
Qty: 8 TABLET | Refills: 6 | Status: SHIPPED | OUTPATIENT
Start: 2023-08-17

## 2023-08-17 NOTE — PROGRESS NOTES
headache     Nausea & vomiting     Thyroid disease     hypothyroidism    Trauma        Past Surgical History:   Procedure Laterality Date    BREAST REDUCTION SURGERY      bilateral     SECTION      x2    CHOLECYSTECTOMY      DILATION AND CURETTAGE OF UTERUS      x3    HYSTERECTOMY (CERVIX STATUS UNKNOWN)      OTHER SURGICAL HISTORY  2017    REMOVED EXTRA RIB IN SPINE    PARTIAL HYSTERECTOMY (CERVIX NOT REMOVED)      PELVIC LAPAROSCOPY      VASCULAR SURGERY         Current Outpatient Medications   Medication Sig Dispense Refill    Rimegepant Sulfate (NURTEC) 75 MG TBDP TAKE 1 TAB BY MOUTH AS NEEDED FOR MIGRAINE. MAX 1 DOSE IN 24 HOURS. 8 tablet 6    EPINEPHrine (EPIPEN) 0.3 MG/0.3ML SOAJ injection INJECT 1 PEN/AUTOINJECTOR INTRAMUSCULARLY AS NEEDED FOR ALLERGIC REACTION 1 each 2    metoprolol succinate (TOPROL XL) 25 MG extended release tablet TAKE 1 TABLET BY MOUTH EVERY DAY 90 tablet 2     No current facility-administered medications for this visit.         Allergies   Allergen Reactions    Codeine Anaphylaxis, Hives, Itching and Swelling    Shellfish Allergy Shortness Of Breath    Amlodipine Other (See Comments)     BLE edema    Galcanezumab-Gnlm Rash       Social History     Tobacco Use    Smoking status: Never    Smokeless tobacco: Never   Substance Use Topics    Alcohol use: Not Currently     Alcohol/week: 0.0 standard drinks    Drug use: Never       Family History   Problem Relation Age of Onset    No Known Problems Paternal Grandmother     No Known Problems Paternal Grandfather     Migraines Son     Migraines Daughter     Migraines Mother     Hypertension Mother     Cancer Father         Non Hodgens lymphoma    Heart Disease Father     Heart Attack Father     No Known Problems Sister     Diabetes Maternal Grandmother     Dementia Maternal Grandmother     No Known Problems Maternal Grandfather        Review of Systems:  GENERAL: Denies fever or fatigue  CARDIAC: No CP or SOB  PULMONARY: No cough or

## 2023-09-05 ENCOUNTER — OFFICE VISIT (OUTPATIENT)
Age: 43
End: 2023-09-05
Payer: COMMERCIAL

## 2023-09-05 VITALS
HEART RATE: 71 BPM | SYSTOLIC BLOOD PRESSURE: 108 MMHG | HEIGHT: 68 IN | RESPIRATION RATE: 18 BRPM | OXYGEN SATURATION: 99 % | TEMPERATURE: 98 F | DIASTOLIC BLOOD PRESSURE: 66 MMHG | WEIGHT: 245.25 LBS | BODY MASS INDEX: 37.17 KG/M2

## 2023-09-05 DIAGNOSIS — R51.9 CHRONIC NONINTRACTABLE HEADACHE, UNSPECIFIED HEADACHE TYPE: ICD-10-CM

## 2023-09-05 DIAGNOSIS — R79.89 ABNORMAL CBC: Primary | ICD-10-CM

## 2023-09-05 DIAGNOSIS — E78.5 HYPERLIPIDEMIA, UNSPECIFIED HYPERLIPIDEMIA TYPE: ICD-10-CM

## 2023-09-05 DIAGNOSIS — G47.33 OSA (OBSTRUCTIVE SLEEP APNEA): ICD-10-CM

## 2023-09-05 DIAGNOSIS — R73.9 HYPERGLYCEMIA: ICD-10-CM

## 2023-09-05 DIAGNOSIS — K52.9 GASTROENTERITIS: ICD-10-CM

## 2023-09-05 DIAGNOSIS — I10 ESSENTIAL (PRIMARY) HYPERTENSION: ICD-10-CM

## 2023-09-05 DIAGNOSIS — G89.29 CHRONIC NONINTRACTABLE HEADACHE, UNSPECIFIED HEADACHE TYPE: ICD-10-CM

## 2023-09-05 PROCEDURE — 99214 OFFICE O/P EST MOD 30 MIN: CPT | Performed by: INTERNAL MEDICINE

## 2023-09-05 PROCEDURE — 3074F SYST BP LT 130 MM HG: CPT | Performed by: INTERNAL MEDICINE

## 2023-09-05 PROCEDURE — 3078F DIAST BP <80 MM HG: CPT | Performed by: INTERNAL MEDICINE

## 2023-09-11 ASSESSMENT — ENCOUNTER SYMPTOMS
BLOOD IN STOOL: 0
ABDOMINAL PAIN: 0
EYE PAIN: 0
SORE THROAT: 0
COUGH: 0
SHORTNESS OF BREATH: 0
ANAL BLEEDING: 0

## 2023-10-14 DIAGNOSIS — G43.019 MIGRAINE WITHOUT AURA, INTRACTABLE, WITHOUT STATUS MIGRAINOSUS: ICD-10-CM

## 2023-10-18 RX ORDER — RIMEGEPANT SULFATE 75 MG/75MG
TABLET, ORALLY DISINTEGRATING ORAL
Qty: 8 TABLET | Refills: 6 | Status: SHIPPED | OUTPATIENT
Start: 2023-10-18

## 2024-02-29 ENCOUNTER — HOSPITAL ENCOUNTER (OUTPATIENT)
Facility: HOSPITAL | Age: 44
Discharge: HOME | End: 2024-02-29
Payer: COMMERCIAL

## 2024-02-29 DIAGNOSIS — R73.9 HYPERGLYCEMIA: ICD-10-CM

## 2024-02-29 DIAGNOSIS — R79.89 ABNORMAL CBC: ICD-10-CM

## 2024-02-29 LAB
ALBUMIN SERPL-MCNC: 3.8 G/DL (ref 3.4–5)
ALBUMIN/GLOB SERPL: 1.1 (ref 0.8–1.7)
ALP SERPL-CCNC: 60 U/L (ref 45–117)
ALT SERPL-CCNC: 18 U/L (ref 13–56)
ANION GAP SERPL CALC-SCNC: 0 MMOL/L (ref 3–18)
AST SERPL-CCNC: 10 U/L (ref 10–38)
BASOPHILS # BLD: 0 K/UL (ref 0–0.1)
BASOPHILS NFR BLD: 0 % (ref 0–2)
BILIRUB SERPL-MCNC: 0.6 MG/DL (ref 0.2–1)
BUN SERPL-MCNC: 10 MG/DL (ref 7–18)
BUN/CREAT SERPL: 14 (ref 12–20)
CALCIUM SERPL-MCNC: 9.6 MG/DL (ref 8.5–10.1)
CHLORIDE SERPL-SCNC: 107 MMOL/L (ref 100–111)
CO2 SERPL-SCNC: 34 MMOL/L (ref 21–32)
CREAT SERPL-MCNC: 0.73 MG/DL (ref 0.6–1.3)
DIFFERENTIAL METHOD BLD: ABNORMAL
EOSINOPHIL # BLD: 0.4 K/UL (ref 0–0.4)
EOSINOPHIL NFR BLD: 4 % (ref 0–5)
ERYTHROCYTE [DISTWIDTH] IN BLOOD BY AUTOMATED COUNT: 12.5 % (ref 11.6–14.5)
EST. AVERAGE GLUCOSE BLD GHB EST-MCNC: 117 MG/DL
GLOBULIN SER CALC-MCNC: 3.6 G/DL (ref 2–4)
GLUCOSE SERPL-MCNC: 102 MG/DL (ref 74–99)
HBA1C MFR BLD: 5.7 % (ref 4.2–5.6)
HCT VFR BLD AUTO: 42.7 % (ref 35–45)
HGB BLD-MCNC: 14.5 G/DL (ref 12–16)
IMM GRANULOCYTES # BLD AUTO: 0 K/UL (ref 0–0.04)
IMM GRANULOCYTES NFR BLD AUTO: 0 % (ref 0–0.5)
LYMPHOCYTES # BLD: 2 K/UL (ref 0.9–3.6)
LYMPHOCYTES NFR BLD: 20 % (ref 21–52)
MCH RBC QN AUTO: 31.7 PG (ref 24–34)
MCHC RBC AUTO-ENTMCNC: 34 G/DL (ref 31–37)
MCV RBC AUTO: 93.2 FL (ref 78–100)
MONOCYTES # BLD: 0.7 K/UL (ref 0.05–1.2)
MONOCYTES NFR BLD: 6 % (ref 3–10)
NEUTS SEG # BLD: 7.2 K/UL (ref 1.8–8)
NEUTS SEG NFR BLD: 70 % (ref 40–73)
NRBC # BLD: 0 K/UL (ref 0–0.01)
NRBC BLD-RTO: 0 PER 100 WBC
PLATELET # BLD AUTO: 390 K/UL (ref 135–420)
PMV BLD AUTO: 8.5 FL (ref 9.2–11.8)
POTASSIUM SERPL-SCNC: 4.1 MMOL/L (ref 3.5–5.5)
PROT SERPL-MCNC: 7.4 G/DL (ref 6.4–8.2)
RBC # BLD AUTO: 4.58 M/UL (ref 4.2–5.3)
SODIUM SERPL-SCNC: 141 MMOL/L (ref 136–145)
WBC # BLD AUTO: 10.3 K/UL (ref 4.6–13.2)

## 2024-02-29 PROCEDURE — 80053 COMPREHEN METABOLIC PANEL: CPT

## 2024-02-29 PROCEDURE — 85025 COMPLETE CBC W/AUTO DIFF WBC: CPT

## 2024-02-29 PROCEDURE — 83036 HEMOGLOBIN GLYCOSYLATED A1C: CPT

## 2024-03-14 ENCOUNTER — OFFICE VISIT (OUTPATIENT)
Age: 44
End: 2024-03-14
Payer: COMMERCIAL

## 2024-03-14 VITALS
DIASTOLIC BLOOD PRESSURE: 84 MMHG | BODY MASS INDEX: 37.07 KG/M2 | HEIGHT: 68 IN | TEMPERATURE: 98.7 F | SYSTOLIC BLOOD PRESSURE: 125 MMHG | RESPIRATION RATE: 18 BRPM | WEIGHT: 244.6 LBS | HEART RATE: 88 BPM | OXYGEN SATURATION: 97 %

## 2024-03-14 DIAGNOSIS — E78.5 HYPERLIPIDEMIA, UNSPECIFIED HYPERLIPIDEMIA TYPE: ICD-10-CM

## 2024-03-14 DIAGNOSIS — G47.33 OSA (OBSTRUCTIVE SLEEP APNEA): ICD-10-CM

## 2024-03-14 DIAGNOSIS — R73.9 HYPERGLYCEMIA: Primary | ICD-10-CM

## 2024-03-14 DIAGNOSIS — I10 ESSENTIAL (PRIMARY) HYPERTENSION: ICD-10-CM

## 2024-03-14 PROCEDURE — 3074F SYST BP LT 130 MM HG: CPT | Performed by: INTERNAL MEDICINE

## 2024-03-14 PROCEDURE — 99214 OFFICE O/P EST MOD 30 MIN: CPT | Performed by: INTERNAL MEDICINE

## 2024-03-14 PROCEDURE — 3079F DIAST BP 80-89 MM HG: CPT | Performed by: INTERNAL MEDICINE

## 2024-03-14 ASSESSMENT — PATIENT HEALTH QUESTIONNAIRE - PHQ9
2. FEELING DOWN, DEPRESSED OR HOPELESS: NOT AT ALL
SUM OF ALL RESPONSES TO PHQ QUESTIONS 1-9: 0
SUM OF ALL RESPONSES TO PHQ9 QUESTIONS 1 & 2: 0
1. LITTLE INTEREST OR PLEASURE IN DOING THINGS: NOT AT ALL

## 2024-03-14 NOTE — PROGRESS NOTES
[facility]:  3/20/2024, MRN: [id number]      Sophy Quiroga is a 43 y.o. female and presents to clinic for Follow-up and Hypertension (HTN)      Subjective:   The pt is a 42yo female with h/o ?hypothyroidism, migraine HAs (refractory to botox rx and multiple rx in the past, followed by [facility]), thoracic outlet syndrome (left) s/p surgery 12/17, HTN, HLD, prediabetes, vitamin D deficiency, elevated CRP, empty sella, benign essential tremor, hip OA, and kidney stones.    1.  Hypertension: Treated with metoprolol 25 mg daily.  She will have a history of migraines.  Treated with Nurtec as needed.  Followed by neurology.  Blood pressure today is 125/84.  Her most recent lab work shows that her creatinine and electrolytes are normal.    2.  Prediabetes/Hyperglycemia: Recent lab work shows that her A1c is up to 5.7 from 5.6. She has not been eating the best since her last apt but has been through a lot. She has had multiple deaths in her family.     3.  NICOLAS: At her last appointment she reported having sinus congestion with using her nasal CPAP machine.  A mouthguard was encouraged.  Today she reports: her insurance would not cover a mouthguard so she uses CPAP every other day.     4.  Hyperlipidemia: She is due to have her cholesterol checked later this year.  She is not on a cholesterol-lowering medication.  Her last LDL last summer was 111.      Patient Active Problem List    Diagnosis Date Noted    Empty sella (HCC) 01/05/2023    Hyperlipidemia 01/05/2023    Prediabetes 01/05/2023    Elevated C-reactive protein (CRP) 01/05/2023    Vitamin D deficiency 01/05/2023    Nephrolithiasis 01/05/2023    Anxiety 02/02/2022    Hypertension 02/02/2022    Severe obesity (HCC) 03/08/2019    Family history of premature CAD 12/14/2018    Acquired hypothyroidism 10/07/2018    Migraine 10/07/2018       Current Outpatient Medications   Medication Sig Dispense Refill    NURTEC 75 MG TBDP TAKE 1 TAB BY MOUTH AS

## 2024-03-14 NOTE — PROGRESS NOTES
Sophy Quiroga presents today for   Chief Complaint   Patient presents with    Follow-up    Hypertension     HTN       BP retake; 125/84     \"Have you been to the ER, urgent care clinic since your last visit?  Hospitalized since your last visit?\"    NO    “Have you seen or consulted any other health care providers outside of [facility] since your last visit?”    NO

## 2024-03-20 ASSESSMENT — ENCOUNTER SYMPTOMS
ABDOMINAL PAIN: 0
ANAL BLEEDING: 0
EYE PAIN: 0
BLOOD IN STOOL: 0
SORE THROAT: 0
SHORTNESS OF BREATH: 0
COUGH: 0

## 2024-04-11 ENCOUNTER — OFFICE VISIT (OUTPATIENT)
Age: 44
End: 2024-04-11
Payer: COMMERCIAL

## 2024-04-11 VITALS
SYSTOLIC BLOOD PRESSURE: 170 MMHG | WEIGHT: 241 LBS | BODY MASS INDEX: 36.53 KG/M2 | HEIGHT: 68 IN | DIASTOLIC BLOOD PRESSURE: 108 MMHG | OXYGEN SATURATION: 96 % | HEART RATE: 71 BPM

## 2024-04-11 DIAGNOSIS — Z82.49 FAMILY HISTORY OF ISCHEMIC HEART DISEASE AND OTHER DISEASES OF THE CIRCULATORY SYSTEM: Primary | ICD-10-CM

## 2024-04-11 DIAGNOSIS — R07.9 CHEST PAIN, UNSPECIFIED TYPE: ICD-10-CM

## 2024-04-11 DIAGNOSIS — R03.0 ELEVATED BLOOD-PRESSURE READING, WITHOUT DIAGNOSIS OF HYPERTENSION: ICD-10-CM

## 2024-04-11 PROCEDURE — 3080F DIAST BP >= 90 MM HG: CPT | Performed by: INTERNAL MEDICINE

## 2024-04-11 PROCEDURE — 3075F SYST BP GE 130 - 139MM HG: CPT | Performed by: INTERNAL MEDICINE

## 2024-04-11 PROCEDURE — 99214 OFFICE O/P EST MOD 30 MIN: CPT | Performed by: INTERNAL MEDICINE

## 2024-04-11 PROCEDURE — 93000 ELECTROCARDIOGRAM COMPLETE: CPT | Performed by: INTERNAL MEDICINE

## 2024-04-11 ASSESSMENT — PATIENT HEALTH QUESTIONNAIRE - PHQ9
2. FEELING DOWN, DEPRESSED OR HOPELESS: NOT AT ALL
SUM OF ALL RESPONSES TO PHQ QUESTIONS 1-9: 0
SUM OF ALL RESPONSES TO PHQ QUESTIONS 1-9: 0
1. LITTLE INTEREST OR PLEASURE IN DOING THINGS: NOT AT ALL
SUM OF ALL RESPONSES TO PHQ QUESTIONS 1-9: 0
SUM OF ALL RESPONSES TO PHQ QUESTIONS 1-9: 0
SUM OF ALL RESPONSES TO PHQ9 QUESTIONS 1 & 2: 0

## 2024-04-11 NOTE — PROGRESS NOTES
Sophy Quiroga presents today for   Chief Complaint   Patient presents with    Follow-up     14 month f/u with no concerns        Sophyabiodun Quiroga preferred language for health care discussion is english/other.    Is someone accompanying this pt? no    Is the patient using any DME equipment during OV? no    Depression Screening:  Depression: Not at risk (4/11/2024)    PHQ-2     PHQ-2 Score: 0        Learning Assessment:  Who is the primary learner? Patient    What is the preferred language for health care of the primary learner? ENGLISH    How does the primary learner prefer to learn new concepts? DEMONSTRATION    Answered By patient    Relationship to Learner SELF           Pt currently taking Anticoagulant therapy? no    Pt currently taking Antiplatelet therapy ? no      Coordination of Care:  1. Have you been to the ER, urgent care clinic since your last visit? Hospitalized since your last visit? no    2. Have you seen or consulted any other health care providers outside of the [facility] since your last visit? Include any pap smears or colon screening. no    
tenderness.   Abdominal:      General: Bowel sounds are normal.      Palpations: Abdomen is soft.   Musculoskeletal:         General: No tenderness.   Skin:     General: Skin is warm and dry.   Neurological:      Mental Status: She is alert and oriented to person, place, and time.       EKG today shows: NSR, normal axis and intervals, no ST segment abnormalities    Lab Results   Component Value Date/Time    Cholesterol, total 211 (H) 05/25/2022 10:51 AM    HDL Cholesterol 41 05/25/2022 10:51 AM    LDL, calculated 149.6 (H) 05/25/2022 10:51 AM    VLDL, calculated 20.4 05/25/2022 10:51 AM    Triglyceride 102 05/25/2022 10:51 AM    CHOL/HDL Ratio 5.1 (H) 05/25/2022 10:51 AM     Impression and Plan:  Sophy Quiroga is a 42 y.o. with:    1.) HTN, avg SBP goal <140  2.) Normal stress echo 2018  3.) Migraines, greatly improved after eliminating soy  4.) +FH premature ASCVD  5.) H/o thoracic outlet, known  6.) Atypical CP, likely noncardiac    Stress test negative and low risk  CAC to help reclassify due to FH  RTC yearly with EKG, will call with further recs    Thank you for allowing me to participate in the care of your patient, please do not hesitate to call with questions or concerns.    Regards,    Cathie Rothman, DO

## 2024-04-11 NOTE — PATIENT INSTRUCTIONS
If you have not heard from the central scheduler to schedule your testing in 48 hours, please call [phone].

## 2024-05-08 ENCOUNTER — HOSPITAL ENCOUNTER (OUTPATIENT)
Facility: HOSPITAL | Age: 44
Discharge: HOME | End: 2024-05-11
Attending: INTERNAL MEDICINE

## 2024-05-08 DIAGNOSIS — Z82.49 FAMILY HISTORY OF ISCHEMIC HEART DISEASE AND OTHER DISEASES OF THE CIRCULATORY SYSTEM: ICD-10-CM

## 2024-05-08 PROCEDURE — 75571 CT HRT W/O DYE W/CA TEST: CPT

## 2024-05-09 ENCOUNTER — APPOINTMENT (OUTPATIENT)
Facility: HOSPITAL | Age: 44
End: 2024-05-09
Payer: COMMERCIAL

## 2024-05-09 ENCOUNTER — HOSPITAL ENCOUNTER (EMERGENCY)
Facility: HOSPITAL | Age: 44
Discharge: HOME | End: 2024-05-09
Payer: COMMERCIAL

## 2024-05-09 VITALS
RESPIRATION RATE: 18 BRPM | HEART RATE: 81 BPM | SYSTOLIC BLOOD PRESSURE: 159 MMHG | OXYGEN SATURATION: 100 % | TEMPERATURE: 98.1 F | DIASTOLIC BLOOD PRESSURE: 90 MMHG

## 2024-05-09 DIAGNOSIS — M25.571 ARTHRALGIA OF RIGHT FOOT: Primary | ICD-10-CM

## 2024-05-09 DIAGNOSIS — S96.911A STRAIN OF RIGHT FOOT, INITIAL ENCOUNTER: ICD-10-CM

## 2024-05-09 PROCEDURE — 99283 EMERGENCY DEPT VISIT LOW MDM: CPT

## 2024-05-09 PROCEDURE — 73630 X-RAY EXAM OF FOOT: CPT

## 2024-05-09 NOTE — ED PROVIDER NOTES
[facility] EMERGENCY DEPT  EMERGENCY DEPARTMENT ENCOUNTER       Pt Name: Sophy Quiroga  MRN: [id number]  Birthdate 1980  Date of evaluation: 2024  PCP: Peggy Harris MD  Note Started: 5:13 PM 24     CHIEF COMPLAINT       Chief Complaint   Patient presents with    Foot Injury     Pt c/o R foot pain described as \"burning\" to the bottom of her foot near her arch. Pt states her family member accidentally fell onto her, causing her to fall and sprain her foot. Pt is limping in triage.        HISTORY OF PRESENT ILLNESS: 1 or more elements      History From: Patient  HPI Limitations: None  Chronic Conditions: HTN, HLP, migraine, ovarian cys, thoracic outlet syndrome, renal stones, endometriosis  Social Determinants affecting Dx or Tx: none      Sophy Quiroga is a 44 y.o. female who presents to ED c/o right foot injury. Pt was helping her mother move when her right foot bent and she feels pain to mid plantar aspect. She describes burning sensation. No numbness, weakness, color changing.      Nursing Notes were all reviewed and agreed with or any disagreements were addressed in the HPI.    PAST HISTORY     Past Medical History:  Past Medical History:   Diagnosis Date    Adverse effect of anesthesia     HX OF COLLAPSED LUNG    Benign tumor of cervix     Calculus of kidney     Elevated BP without diagnosis of hypertension     Empty sella syndrome (HCC)     Endometriosis     Fracture of finger of right hand 2018    5th digit    H/O thoracic outlet syndrome     Headache     Hyperlipidemia 2023    Hypertension     Ill-defined condition     OVARIAN CYST    Ill-defined condition     COLLAPSED LUNG    Migraine headache     Nausea & vomiting     Thyroid disease     hypothyroidism    Trauma        Past Surgical History:  Past Surgical History:   Procedure Laterality Date    BREAST REDUCTION SURGERY      bilateral     SECTION      x2    CHOLECYSTECTOMY      DILATION AND CURETTAGE OF UTERUS      x3

## 2024-05-09 NOTE — ED TRIAGE NOTES
Pt c/o R foot pain described as \"burning\" to the bottom of her foot near her arch. Pt states her family member accidentally fell onto her, causing her to fall and sprain her foot. Pt is limping in triage.

## 2024-05-13 DIAGNOSIS — I10 ESSENTIAL (PRIMARY) HYPERTENSION: ICD-10-CM

## 2024-05-13 RX ORDER — METOPROLOL SUCCINATE 25 MG/1
TABLET, EXTENDED RELEASE ORAL
Qty: 90 TABLET | Refills: 2 | Status: SHIPPED | OUTPATIENT
Start: 2024-05-13

## 2024-10-10 ENCOUNTER — OFFICE VISIT (OUTPATIENT)
Age: 44
End: 2024-10-10
Payer: COMMERCIAL

## 2024-10-10 VITALS
BODY MASS INDEX: 36.07 KG/M2 | OXYGEN SATURATION: 97 % | TEMPERATURE: 98.2 F | DIASTOLIC BLOOD PRESSURE: 86 MMHG | WEIGHT: 238 LBS | SYSTOLIC BLOOD PRESSURE: 138 MMHG | HEART RATE: 76 BPM | HEIGHT: 68 IN

## 2024-10-10 DIAGNOSIS — G43.009 MIGRAINE WITHOUT AURA, NOT INTRACTABLE, WITHOUT STATUS MIGRAINOSUS: Primary | ICD-10-CM

## 2024-10-10 PROCEDURE — 3079F DIAST BP 80-89 MM HG: CPT | Performed by: NURSE PRACTITIONER

## 2024-10-10 PROCEDURE — 99213 OFFICE O/P EST LOW 20 MIN: CPT | Performed by: NURSE PRACTITIONER

## 2024-10-10 PROCEDURE — 3075F SYST BP GE 130 - 139MM HG: CPT | Performed by: NURSE PRACTITIONER

## 2024-10-10 RX ORDER — RIMEGEPANT SULFATE 75 MG/75MG
75 TABLET, ORALLY DISINTEGRATING ORAL AS NEEDED
Qty: 8 TABLET | Refills: 11 | Status: SHIPPED | OUTPATIENT
Start: 2024-10-10

## 2024-12-16 ENCOUNTER — HOSPITAL ENCOUNTER (OUTPATIENT)
Facility: HOSPITAL | Age: 44
Discharge: HOME | End: 2024-12-19
Payer: COMMERCIAL

## 2024-12-16 DIAGNOSIS — R73.9 HYPERGLYCEMIA: ICD-10-CM

## 2024-12-16 DIAGNOSIS — E78.5 HYPERLIPIDEMIA, UNSPECIFIED HYPERLIPIDEMIA TYPE: ICD-10-CM

## 2024-12-16 DIAGNOSIS — I10 ESSENTIAL (PRIMARY) HYPERTENSION: ICD-10-CM

## 2024-12-16 LAB
CHOLEST SERPL-MCNC: 196 MG/DL
CREAT UR-MCNC: 36 MG/DL (ref 30–125)
EST. AVERAGE GLUCOSE BLD GHB EST-MCNC: 117 MG/DL
HBA1C MFR BLD: 5.7 % (ref 4.2–5.6)
HDLC SERPL-MCNC: 47 MG/DL (ref 40–60)
HDLC SERPL: 4.2 (ref 0–5)
LDLC SERPL CALC-MCNC: 132.2 MG/DL (ref 0–100)
LIPID PANEL: ABNORMAL
MICROALBUMIN UR-MCNC: 1.19 MG/DL (ref 0–3)
MICROALBUMIN/CREAT UR-RTO: 33 MG/G (ref 0–30)
TRIGL SERPL-MCNC: 84 MG/DL
VLDLC SERPL CALC-MCNC: 16.8 MG/DL

## 2024-12-16 PROCEDURE — 36415 COLL VENOUS BLD VENIPUNCTURE: CPT

## 2024-12-16 PROCEDURE — 82043 UR ALBUMIN QUANTITATIVE: CPT

## 2024-12-16 PROCEDURE — 83036 HEMOGLOBIN GLYCOSYLATED A1C: CPT

## 2024-12-16 PROCEDURE — 82570 ASSAY OF URINE CREATININE: CPT

## 2024-12-16 PROCEDURE — 80061 LIPID PANEL: CPT

## 2024-12-18 SDOH — ECONOMIC STABILITY: FOOD INSECURITY: WITHIN THE PAST 12 MONTHS, YOU WORRIED THAT YOUR FOOD WOULD RUN OUT BEFORE YOU GOT MONEY TO BUY MORE.: NEVER TRUE

## 2024-12-18 SDOH — ECONOMIC STABILITY: TRANSPORTATION INSECURITY
IN THE PAST 12 MONTHS, HAS LACK OF TRANSPORTATION KEPT YOU FROM MEETINGS, WORK, OR FROM GETTING THINGS NEEDED FOR DAILY LIVING?: NO

## 2024-12-18 SDOH — ECONOMIC STABILITY: INCOME INSECURITY: HOW HARD IS IT FOR YOU TO PAY FOR THE VERY BASICS LIKE FOOD, HOUSING, MEDICAL CARE, AND HEATING?: NOT HARD AT ALL

## 2024-12-18 SDOH — ECONOMIC STABILITY: FOOD INSECURITY: WITHIN THE PAST 12 MONTHS, THE FOOD YOU BOUGHT JUST DIDN'T LAST AND YOU DIDN'T HAVE MONEY TO GET MORE.: NEVER TRUE

## 2024-12-19 ENCOUNTER — OFFICE VISIT (OUTPATIENT)
Facility: CLINIC | Age: 44
End: 2024-12-19
Payer: COMMERCIAL

## 2024-12-19 VITALS
BODY MASS INDEX: 36.68 KG/M2 | DIASTOLIC BLOOD PRESSURE: 88 MMHG | RESPIRATION RATE: 17 BRPM | WEIGHT: 242 LBS | HEIGHT: 68 IN | HEART RATE: 96 BPM | SYSTOLIC BLOOD PRESSURE: 137 MMHG | OXYGEN SATURATION: 97 % | TEMPERATURE: 98.4 F

## 2024-12-19 DIAGNOSIS — I10 ESSENTIAL (PRIMARY) HYPERTENSION: ICD-10-CM

## 2024-12-19 DIAGNOSIS — R73.9 HYPERGLYCEMIA: Primary | ICD-10-CM

## 2024-12-19 DIAGNOSIS — E78.5 HYPERLIPIDEMIA, UNSPECIFIED HYPERLIPIDEMIA TYPE: ICD-10-CM

## 2024-12-19 DIAGNOSIS — L98.9 SKIN LESION: ICD-10-CM

## 2024-12-19 DIAGNOSIS — Z12.31 ENCOUNTER FOR SCREENING MAMMOGRAM FOR BREAST CANCER: ICD-10-CM

## 2024-12-19 DIAGNOSIS — G47.33 OSA (OBSTRUCTIVE SLEEP APNEA): ICD-10-CM

## 2024-12-19 DIAGNOSIS — Z12.11 ENCOUNTER FOR SCREENING FOR MALIGNANT NEOPLASM OF COLON: ICD-10-CM

## 2024-12-19 PROBLEM — E23.6 EMPTY SELLA (HCC): Status: RESOLVED | Noted: 2023-01-05 | Resolved: 2024-12-19

## 2024-12-19 PROCEDURE — 3079F DIAST BP 80-89 MM HG: CPT | Performed by: INTERNAL MEDICINE

## 2024-12-19 PROCEDURE — 99214 OFFICE O/P EST MOD 30 MIN: CPT | Performed by: INTERNAL MEDICINE

## 2024-12-19 PROCEDURE — 3075F SYST BP GE 130 - 139MM HG: CPT | Performed by: INTERNAL MEDICINE

## 2024-12-19 NOTE — PROGRESS NOTES
[facility]:  12/25/2024, MRN: [id number]      Sophy Quiroga is a 44 y.o. female and presents to clinic for Follow-up (6 mon f/u)      Subjective:   The pt is a 45yo female with h/o ?hypothyroidism, migraine HAs (refractory to botox rx and multiple rx in the past, followed by [facility]), thoracic outlet syndrome (left) s/p surgery 12/17, HTN, HLD, prediabetes, vitamin D deficiency, elevated CRP, empty sella, benign essential tremor, hip OA, NICOLAS (treated with a mouthguard), and kidney stones.     1. HTN: BP is 137/88. On metoprolol 25mg daily.     2. Prediabetes/Hyperglycemia: Her A1C is unchanged per recent labs. \"I've been baking.\"      Latest Reference Range & Units 02/29/24 13:03 12/16/24 13:36   Hemoglobin A1C 4.2 - 5.6 % 5.7 (H) 5.7 (H)   (H): Data is abnormally high    3. NICOLAS: Using a mouthguard: yes. +Unable to tolerate CPAP.     4. Health Maintenance:  - Mammogram: overdue  - UTD on there tdap, hiv screen, and hepatitis c screen  - UTD on hepatitis b vaccine series.   - Colon cancer screening: overdue    5. HLD:  No tobacco use.      Latest Reference Range & Units 07/17/23 15:03 12/16/24 13:36   Chol/HDL Ratio 0 - 5.0   4.5 4.2   Cholesterol, Total <200 MG/ 196   HDL Cholesterol 40 - 60 MG/DL 43 47   LDL Cholesterol 0 - 100 MG/.6 (H) 132.2 (H)   Triglycerides <150 MG/ (H) 84   (H): Data is abnormally high    6. Right Temple Skin Lesion: It has been more textured since last month. She has had this skin lesion x yrs.         Patient Active Problem List    Diagnosis Date Noted    Hyperlipidemia 01/05/2023    Prediabetes 01/05/2023    Elevated C-reactive protein (CRP) 01/05/2023    Vitamin D deficiency 01/05/2023    Nephrolithiasis 01/05/2023    Anxiety 02/02/2022    Hypertension 02/02/2022    Severe obesity 03/08/2019    Family history of premature CAD 12/14/2018    Migraine 10/07/2018       Current Outpatient Medications   Medication Sig Dispense Refill

## 2024-12-19 NOTE — PROGRESS NOTES
Sophy ALBRIGHT Rhettremberto presents today for   Chief Complaint   Patient presents with    Follow-up     6 mon f/u           \"Have you been to the ER, urgent care clinic since your last visit?  Hospitalized since your last visit?\"    NO    “Have you seen or consulted any other health care providers outside of [facility] since your last visit?”    YES - When: approximately 2 months ago.  Where and Why: Neurology-f/u.    “Have you had a colorectal cancer screening such as a colonoscopy/FIT/Cologuard?    NO    No colonoscopy on file  No cologuard on file  No FIT/FOBT on file   No flexible sigmoidoscopy on file        Have you had a mammogram?”   NO    Date of last Mammogram: 8/16/2021

## 2024-12-25 PROBLEM — E03.9 ACQUIRED HYPOTHYROIDISM: Status: RESOLVED | Noted: 2018-10-07 | Resolved: 2024-12-25

## 2024-12-25 ASSESSMENT — ENCOUNTER SYMPTOMS
SHORTNESS OF BREATH: 0
SORE THROAT: 0
COUGH: 0
ABDOMINAL PAIN: 0
EYE PAIN: 0
BLOOD IN STOOL: 0
ANAL BLEEDING: 0

## 2025-06-13 ENCOUNTER — HOSPITAL ENCOUNTER (OUTPATIENT)
Facility: HOSPITAL | Age: 45
Setting detail: SPECIMEN
Discharge: HOME | End: 2025-06-16

## 2025-06-13 LAB — LABCORP SPECIMEN COLLECTION: NORMAL

## 2025-06-13 PROCEDURE — 99001 SPECIMEN HANDLING PT-LAB: CPT

## 2025-06-17 LAB
HBA1C MFR BLD: 5.7 % (ref 4.8–5.6)
SPECIMEN STATUS REPORT: NORMAL

## 2025-06-18 LAB
ALBUMIN SERPL-MCNC: 4.5 G/DL (ref 3.9–4.9)
ALBUMIN/CREAT UR: 30 MG/G CREAT (ref 0–29)
ALP SERPL-CCNC: 64 IU/L (ref 44–121)
ALT SERPL-CCNC: 24 IU/L (ref 0–32)
AST SERPL-CCNC: 18 IU/L (ref 0–40)
BILIRUB SERPL-MCNC: 0.2 MG/DL (ref 0–1.2)
BUN SERPL-MCNC: 11 MG/DL (ref 6–24)
BUN/CREAT SERPL: 16 (ref 9–23)
CALCIUM SERPL-MCNC: 9.6 MG/DL (ref 8.7–10.2)
CHLORIDE SERPL-SCNC: 98 MMOL/L (ref 96–106)
CHOLEST SERPL-MCNC: 187 MG/DL (ref 100–199)
CO2 SERPL-SCNC: 22 MMOL/L (ref 20–29)
CREAT SERPL-MCNC: 0.68 MG/DL (ref 0.57–1)
CREAT UR-MCNC: 59.5 MG/DL
EGFRCR SERPLBLD CKD-EPI 2021: 109 ML/MIN/1.73
GLOBULIN SER CALC-MCNC: 2.5 G/DL (ref 1.5–4.5)
GLUCOSE SERPL-MCNC: 108 MG/DL (ref 70–99)
HDLC SERPL-MCNC: 42 MG/DL
LDLC SERPL CALC-MCNC: 124 MG/DL (ref 0–99)
MICROALBUMIN UR-MCNC: 18 UG/ML
POTASSIUM SERPL-SCNC: 3.9 MMOL/L (ref 3.5–5.2)
PROT SERPL-MCNC: 7 G/DL (ref 6–8.5)
SODIUM SERPL-SCNC: 139 MMOL/L (ref 134–144)
TRIGL SERPL-MCNC: 115 MG/DL (ref 0–149)
VLDLC SERPL CALC-MCNC: 21 MG/DL (ref 5–40)

## 2025-06-27 ENCOUNTER — OFFICE VISIT (OUTPATIENT)
Facility: CLINIC | Age: 45
End: 2025-06-27

## 2025-06-27 VITALS
RESPIRATION RATE: 18 BRPM | HEIGHT: 68 IN | HEART RATE: 107 BPM | OXYGEN SATURATION: 95 % | TEMPERATURE: 98.7 F | WEIGHT: 238 LBS | DIASTOLIC BLOOD PRESSURE: 86 MMHG | SYSTOLIC BLOOD PRESSURE: 124 MMHG | BODY MASS INDEX: 36.07 KG/M2

## 2025-06-27 DIAGNOSIS — Z12.31 ENCOUNTER FOR SCREENING MAMMOGRAM FOR BREAST CANCER: ICD-10-CM

## 2025-06-27 DIAGNOSIS — J18.9 PNEUMONIA DUE TO INFECTIOUS ORGANISM, UNSPECIFIED LATERALITY, UNSPECIFIED PART OF LUNG: Primary | ICD-10-CM

## 2025-06-27 DIAGNOSIS — Z12.11 ENCOUNTER FOR SCREENING FOR MALIGNANT NEOPLASM OF COLON: ICD-10-CM

## 2025-06-27 DIAGNOSIS — I27.20 PULMONARY HYPERTENSION (HCC): ICD-10-CM

## 2025-06-27 DIAGNOSIS — I10 ESSENTIAL (PRIMARY) HYPERTENSION: ICD-10-CM

## 2025-06-27 RX ORDER — GUAIFENESIN 600 MG/1
600 TABLET, EXTENDED RELEASE ORAL EVERY 12 HOURS
COMMUNITY
Start: 2025-06-22 | End: 2025-06-29

## 2025-06-27 RX ORDER — TRIAMTERENE AND HYDROCHLOROTHIAZIDE 37.5; 25 MG/1; MG/1
1 TABLET ORAL DAILY
COMMUNITY
Start: 2025-06-22

## 2025-06-27 RX ORDER — CYCLOBENZAPRINE HCL 10 MG
10 TABLET ORAL 2 TIMES DAILY PRN
COMMUNITY
Start: 2025-06-22

## 2025-06-27 RX ORDER — LOSARTAN POTASSIUM 50 MG/1
50 TABLET ORAL DAILY
COMMUNITY
Start: 2025-06-23

## 2025-06-27 RX ORDER — CEFDINIR 300 MG/1
300 CAPSULE ORAL 2 TIMES DAILY
COMMUNITY
Start: 2025-06-22 | End: 2025-06-29

## 2025-06-27 RX ORDER — AZITHROMYCIN 250 MG/1
250 TABLET, FILM COATED ORAL DAILY
COMMUNITY
Start: 2025-06-22

## 2025-06-27 RX ORDER — FAMOTIDINE 40 MG/1
40 TABLET, FILM COATED ORAL DAILY
COMMUNITY
Start: 2025-06-22

## 2025-06-27 RX ORDER — FLUCONAZOLE 200 MG/1
200 TABLET ORAL DAILY
COMMUNITY
Start: 2025-06-23 | End: 2025-06-30

## 2025-06-27 RX ORDER — EPINEPHRINE 0.3 MG/.3ML
INJECTION SUBCUTANEOUS
Qty: 1 EACH | Refills: 2 | Status: SHIPPED | OUTPATIENT
Start: 2025-06-27

## 2025-07-03 ASSESSMENT — ENCOUNTER SYMPTOMS
BLOOD IN STOOL: 0
WHEEZING: 0
ANAL BLEEDING: 0
SORE THROAT: 0
EYE PAIN: 0
ABDOMINAL PAIN: 0

## 2025-07-04 NOTE — PROGRESS NOTES
the plan of care. Handouts provided today with above information. Pt instructed if symptoms worsen to call the office or report to the ED for continued care.  Greater than 50% of the visit time was spent in counseling and/or coordination of care.      Voice recognition was used to generate this report, which may have resulted in some phonetic based errors in grammar and contents. Even though attempts were made to correct all the mistakes, some may have been missed, and remained in the body of the document.      No follow-up provider specified.    Peggy Harris MD

## 2025-07-07 ENCOUNTER — TELEPHONE (OUTPATIENT)
Facility: CLINIC | Age: 45
End: 2025-07-07

## 2025-07-07 NOTE — TELEPHONE ENCOUNTER
[facility] need Pt to update coordination of benefits from 6/27/25 visit. Pts claim cannot be processed until we have correct insurance on file. [employer] is listed as a secondary insurance with an unknown primary payer. If [employer] med[employer] is primary Pt needs to call medicaid and have them correct this.

## 2025-07-10 NOTE — TELEPHONE ENCOUNTER
[facility]  Third Attempt aslo sent first attempt to call and r/s appt for 8/8/2025 Dr. Harris will be out of the office

## 2025-08-05 ENCOUNTER — TELEPHONE (OUTPATIENT)
Facility: CLINIC | Age: 45
End: 2025-08-05

## 2025-08-05 ENCOUNTER — HOSPITAL ENCOUNTER (OUTPATIENT)
Facility: HOSPITAL | Age: 45
Discharge: HOME | End: 2025-08-08
Payer: MEDICAID

## 2025-08-05 DIAGNOSIS — J18.9 PNEUMONIA DUE TO INFECTIOUS ORGANISM, UNSPECIFIED LATERALITY, UNSPECIFIED PART OF LUNG: ICD-10-CM

## 2025-08-05 PROCEDURE — 71046 X-RAY EXAM CHEST 2 VIEWS: CPT

## 2025-08-15 ENCOUNTER — OFFICE VISIT (OUTPATIENT)
Age: 45
End: 2025-08-15
Payer: MEDICAID

## 2025-08-15 VITALS
BODY MASS INDEX: 34.25 KG/M2 | WEIGHT: 226 LBS | DIASTOLIC BLOOD PRESSURE: 89 MMHG | HEIGHT: 68 IN | OXYGEN SATURATION: 98 % | HEART RATE: 87 BPM | SYSTOLIC BLOOD PRESSURE: 133 MMHG

## 2025-08-15 DIAGNOSIS — Z82.49 FAMILY HISTORY OF ISCHEMIC HEART DISEASE AND OTHER DISEASES OF THE CIRCULATORY SYSTEM: ICD-10-CM

## 2025-08-15 DIAGNOSIS — R06.02 SHORTNESS OF BREATH: Primary | ICD-10-CM

## 2025-08-15 DIAGNOSIS — R07.9 CHEST PAIN ON EXERTION: ICD-10-CM

## 2025-08-15 DIAGNOSIS — R03.0 ELEVATED BLOOD-PRESSURE READING, WITHOUT DIAGNOSIS OF HYPERTENSION: ICD-10-CM

## 2025-08-15 PROCEDURE — 3075F SYST BP GE 130 - 139MM HG: CPT | Performed by: INTERNAL MEDICINE

## 2025-08-15 PROCEDURE — 3079F DIAST BP 80-89 MM HG: CPT | Performed by: INTERNAL MEDICINE

## 2025-08-15 PROCEDURE — 99215 OFFICE O/P EST HI 40 MIN: CPT | Performed by: INTERNAL MEDICINE

## 2025-08-15 ASSESSMENT — PATIENT HEALTH QUESTIONNAIRE - PHQ9
SUM OF ALL RESPONSES TO PHQ QUESTIONS 1-9: 0
2. FEELING DOWN, DEPRESSED OR HOPELESS: NOT AT ALL
SUM OF ALL RESPONSES TO PHQ QUESTIONS 1-9: 0
1. LITTLE INTEREST OR PLEASURE IN DOING THINGS: NOT AT ALL

## 2025-08-22 ENCOUNTER — RESULTS FOLLOW-UP (OUTPATIENT)
Age: 45
End: 2025-08-22

## 2025-08-26 ENCOUNTER — OFFICE VISIT (OUTPATIENT)
Facility: CLINIC | Age: 45
End: 2025-08-26
Payer: MEDICAID

## 2025-08-26 VITALS
DIASTOLIC BLOOD PRESSURE: 84 MMHG | HEIGHT: 68 IN | TEMPERATURE: 98.3 F | HEART RATE: 104 BPM | BODY MASS INDEX: 33.8 KG/M2 | WEIGHT: 223 LBS | RESPIRATION RATE: 18 BRPM | SYSTOLIC BLOOD PRESSURE: 128 MMHG | OXYGEN SATURATION: 97 %

## 2025-08-26 DIAGNOSIS — R11.2 NAUSEA AND VOMITING, UNSPECIFIED VOMITING TYPE: ICD-10-CM

## 2025-08-26 DIAGNOSIS — J18.9 PNEUMONIA DUE TO INFECTIOUS ORGANISM, UNSPECIFIED LATERALITY, UNSPECIFIED PART OF LUNG: Primary | ICD-10-CM

## 2025-08-26 DIAGNOSIS — R06.2 WHEEZING: ICD-10-CM

## 2025-08-26 DIAGNOSIS — I10 ESSENTIAL (PRIMARY) HYPERTENSION: ICD-10-CM

## 2025-08-26 PROCEDURE — 3079F DIAST BP 80-89 MM HG: CPT | Performed by: INTERNAL MEDICINE

## 2025-08-26 PROCEDURE — 99214 OFFICE O/P EST MOD 30 MIN: CPT | Performed by: INTERNAL MEDICINE

## 2025-08-26 PROCEDURE — 3074F SYST BP LT 130 MM HG: CPT | Performed by: INTERNAL MEDICINE

## 2025-08-26 RX ORDER — ALBUTEROL SULFATE 90 UG/1
2 INHALANT RESPIRATORY (INHALATION) 4 TIMES DAILY PRN
Qty: 18 G | Refills: 5
Start: 2025-08-26

## 2025-08-26 RX ORDER — TRIAMTERENE AND HYDROCHLOROTHIAZIDE 37.5; 25 MG/1; MG/1
1 TABLET ORAL DAILY
Qty: 90 TABLET | Refills: 2 | Status: SHIPPED | OUTPATIENT
Start: 2025-08-26

## 2025-08-26 RX ORDER — FLUTICASONE PROPIONATE 110 UG/1
2 AEROSOL, METERED RESPIRATORY (INHALATION) 2 TIMES DAILY
Qty: 12 G | Refills: 5 | Status: SHIPPED | OUTPATIENT
Start: 2025-08-26 | End: 2025-08-29 | Stop reason: SDUPTHER

## 2025-08-26 RX ORDER — LOSARTAN POTASSIUM 50 MG/1
50 TABLET ORAL DAILY
Qty: 90 TABLET | Refills: 2 | Status: SHIPPED | OUTPATIENT
Start: 2025-08-26

## 2025-08-26 RX ORDER — ONDANSETRON 4 MG/1
4 TABLET, FILM COATED ORAL 3 TIMES DAILY PRN
Qty: 45 TABLET | Refills: 3 | Status: SHIPPED | OUTPATIENT
Start: 2025-08-26

## 2025-08-28 ENCOUNTER — PATIENT MESSAGE (OUTPATIENT)
Facility: CLINIC | Age: 45
End: 2025-08-28

## 2025-08-28 DIAGNOSIS — R06.2 WHEEZING: ICD-10-CM

## 2025-08-28 DIAGNOSIS — J18.9 PNEUMONIA DUE TO INFECTIOUS ORGANISM, UNSPECIFIED LATERALITY, UNSPECIFIED PART OF LUNG: ICD-10-CM

## 2025-08-29 RX ORDER — FLUTICASONE PROPIONATE 110 UG/1
2 AEROSOL, METERED RESPIRATORY (INHALATION) 2 TIMES DAILY
Qty: 12 G | Refills: 5 | Status: SHIPPED | OUTPATIENT
Start: 2025-08-29 | End: 2026-08-29

## 2025-09-01 ASSESSMENT — ENCOUNTER SYMPTOMS
ANAL BLEEDING: 0
SORE THROAT: 0
WHEEZING: 1
SHORTNESS OF BREATH: 1
EYE PAIN: 0
BLOOD IN STOOL: 0
ABDOMINAL PAIN: 0

## 2025-09-04 ENCOUNTER — HOSPITAL ENCOUNTER (OUTPATIENT)
Facility: HOSPITAL | Age: 45
Discharge: HOME | End: 2025-09-04
Attending: INTERNAL MEDICINE
Payer: MEDICAID

## 2025-09-04 DIAGNOSIS — R06.2 WHEEZING: ICD-10-CM

## 2025-09-04 DIAGNOSIS — J18.9 PNEUMONIA DUE TO INFECTIOUS ORGANISM, UNSPECIFIED LATERALITY, UNSPECIFIED PART OF LUNG: ICD-10-CM

## 2025-09-04 PROCEDURE — 71250 CT THORAX DX C-: CPT
